# Patient Record
Sex: MALE | Race: BLACK OR AFRICAN AMERICAN | NOT HISPANIC OR LATINO | Employment: UNEMPLOYED | URBAN - METROPOLITAN AREA
[De-identification: names, ages, dates, MRNs, and addresses within clinical notes are randomized per-mention and may not be internally consistent; named-entity substitution may affect disease eponyms.]

---

## 2023-10-05 ENCOUNTER — ONCOLOGY MEDICATION OUTREACH (OUTPATIENT)
Dept: HEMATOLOGY/ONCOLOGY | Facility: HOSPITAL | Age: 73
End: 2023-10-05
Payer: MEDICARE

## 2023-10-05 DIAGNOSIS — C90.00 MULTIPLE MYELOMA, REMISSION STATUS UNSPECIFIED (MULTI): Primary | ICD-10-CM

## 2023-10-05 RX ORDER — LENALIDOMIDE 2.5 MG/1
2.5 CAPSULE ORAL DAILY
COMMUNITY
Start: 2019-10-17 | End: 2023-10-05 | Stop reason: SDUPTHER

## 2023-10-05 RX ORDER — LENALIDOMIDE 2.5 MG/1
CAPSULE ORAL
Qty: 28 CAPSULE | Refills: 0 | Status: SHIPPED | OUTPATIENT
Start: 2023-10-05 | End: 2023-11-03 | Stop reason: SDUPTHER

## 2023-10-05 NOTE — PROGRESS NOTES
ONCOLOGY CLINICAL PHARMACY NOTE     Subjective  Varinder Lizama is a 73 y.o. male with MM, here for refill support.        Treatment history  [No matching plan found]     Objective  There were no vitals taken for this visit.  Lab Results   Component Value Date    WBC 3.1 (L) 09/21/2023    HGB 9.2 (L) 09/21/2023    HCT 28.8 (L) 09/21/2023    MCV 90 09/21/2023     (L) 09/21/2023      Lab Results   Component Value Date    GLUCOSE 108 (H) 09/21/2023    CALCIUM 8.9 09/21/2023     09/21/2023    K 2.6 (LL) 09/21/2023    CO2 35 (H) 09/21/2023     09/21/2023    BUN 20 09/21/2023    CREATININE 1.32 (H) 09/21/2023     Lab Results   Component Value Date    ALT 13 09/21/2023    AST 14 09/21/2023    ALKPHOS 66 09/21/2023    BILITOT 0.9 09/21/2023       Allergies and Medications   Not on File    Current Outpatient Medications:     lenalidomide (Revlimid) 2.5 mg capsule, Take one capsule by mouth once daily for 28 days (28-day cycle), Disp: 28 capsule, Rfl: 0    Assessment and Plan  Varinder Lizama is a 73 y.o. male with MM, to be treated with lenalidomide.    Per Dr. Cutler and Trent Davis's authorization, on 10/5/2023, I refilled lenalidomide 2.5 mg PO once daily for 28 days of a 28 day cycle. #28, 0 RF. Auth# [id number] obtained, and the prescription was sent to [facility].    Patient is on Aspirin 81 mg QD for thromboembolic prophylaxis.          Enoch Reece, PharmD

## 2023-10-18 PROBLEM — H52.4 HYPEROPIA WITH PRESBYOPIA OF BOTH EYES: Status: ACTIVE | Noted: 2023-10-18

## 2023-10-18 PROBLEM — G47.00 INSOMNIA: Status: ACTIVE | Noted: 2023-10-18

## 2023-10-18 PROBLEM — I10 HYPERTENSION: Status: ACTIVE | Noted: 2023-10-18

## 2023-10-18 PROBLEM — H93.19 TINNITUS, SUBJECTIVE: Status: ACTIVE | Noted: 2023-10-18

## 2023-10-18 PROBLEM — C90.30 PLASMACYTOMA (MULTI): Status: ACTIVE | Noted: 2023-10-18

## 2023-10-18 PROBLEM — H01.016 BILATERAL ULCERATIVE BLEPHARITIS: Status: ACTIVE | Noted: 2023-10-18

## 2023-10-18 PROBLEM — D64.9 ANEMIA: Status: ACTIVE | Noted: 2023-10-18

## 2023-10-18 PROBLEM — D47.2 SMOLDERING MULTIPLE MYELOMA: Status: ACTIVE | Noted: 2023-10-18

## 2023-10-18 PROBLEM — H35.81 RETINAL EDEMA OF LEFT EYE: Status: ACTIVE | Noted: 2023-10-18

## 2023-10-18 PROBLEM — M79.89 FOOT SWELLING: Status: ACTIVE | Noted: 2023-10-18

## 2023-10-18 PROBLEM — H35.22: Status: ACTIVE | Noted: 2023-10-18

## 2023-10-18 PROBLEM — E63.9 NUTRITION DISORDER: Status: ACTIVE | Noted: 2023-10-18

## 2023-10-18 PROBLEM — H52.12 MYOPIA OF LEFT EYE: Status: ACTIVE | Noted: 2023-10-18

## 2023-10-18 PROBLEM — H52.12 MYOPIA, LEFT EYE: Status: RESOLVED | Noted: 2023-10-18 | Resolved: 2023-10-18

## 2023-10-18 PROBLEM — H52.12 MYOPIA, LEFT EYE: Status: ACTIVE | Noted: 2023-10-18

## 2023-10-18 PROBLEM — Z96.1 PSEUDOPHAKIA OF LEFT EYE: Status: ACTIVE | Noted: 2023-10-18

## 2023-10-18 PROBLEM — H35.40: Status: ACTIVE | Noted: 2023-10-18

## 2023-10-18 PROBLEM — H52.03 HYPEROPIA WITH PRESBYOPIA OF BOTH EYES: Status: ACTIVE | Noted: 2023-10-18

## 2023-10-18 PROBLEM — H52.223 MYOPIA OF BOTH EYES WITH REGULAR ASTIGMATISM: Status: ACTIVE | Noted: 2023-10-18

## 2023-10-18 PROBLEM — H25.11 AGE-RELATED NUCLEAR CATARACT OF RIGHT EYE: Status: ACTIVE | Noted: 2023-10-18

## 2023-10-18 PROBLEM — R26.9 GAIT ABNORMALITY: Status: ACTIVE | Noted: 2023-10-18

## 2023-10-18 PROBLEM — H35.3132 NONEXUDATIVE AGE-RELATED MACULAR DEGENERATION, BILATERAL, INTERMEDIATE DRY STAGE: Status: ACTIVE | Noted: 2023-10-18

## 2023-10-18 PROBLEM — H52.201 ASTIGMATISM OF RIGHT EYE: Status: ACTIVE | Noted: 2023-10-18

## 2023-10-18 PROBLEM — H34.8320 BRANCH RETINAL VEIN OCCLUSION OF LEFT EYE WITH MACULAR EDEMA (CMS-HCC): Status: ACTIVE | Noted: 2023-10-18

## 2023-10-18 PROBLEM — H52.13 BILATERAL MYOPIA: Status: ACTIVE | Noted: 2023-10-18

## 2023-10-18 PROBLEM — F17.210 SMOKES WITH GREATER THAN 30 PACK YEAR HISTORY: Status: ACTIVE | Noted: 2023-10-18

## 2023-10-18 PROBLEM — Z86.79 H/O: HYPERTENSION: Status: ACTIVE | Noted: 2023-10-18

## 2023-10-18 PROBLEM — Q66.72 CAVUS DEFORMITY OF LEFT FOOT: Status: ACTIVE | Noted: 2023-10-18

## 2023-10-18 PROBLEM — M21.42 ACQUIRED PES PLANUS OF BOTH FEET: Status: ACTIVE | Noted: 2023-10-18

## 2023-10-18 PROBLEM — M21.41 ACQUIRED PES PLANUS OF BOTH FEET: Status: ACTIVE | Noted: 2023-10-18

## 2023-10-18 PROBLEM — E87.6 HYPOKALEMIA: Status: ACTIVE | Noted: 2023-10-18

## 2023-10-18 PROBLEM — D89.2 BENIGN (FAMILIAL) PARAPROTEINEMIA: Status: ACTIVE | Noted: 2023-10-18

## 2023-10-18 PROBLEM — H43.812 PVD (POSTERIOR VITREOUS DETACHMENT), LEFT EYE: Status: ACTIVE | Noted: 2023-10-18

## 2023-10-18 PROBLEM — F20.9 SCHIZOPHRENIA (MULTI): Status: ACTIVE | Noted: 2023-10-18

## 2023-10-18 PROBLEM — H01.013 BILATERAL ULCERATIVE BLEPHARITIS: Status: ACTIVE | Noted: 2023-10-18

## 2023-10-18 PROBLEM — B35.1 ONYCHOMYCOSIS OF TOENAIL: Status: ACTIVE | Noted: 2023-10-18

## 2023-10-18 PROBLEM — H25.011 CORTICAL AGE-RELATED CATARACT OF RIGHT EYE: Status: ACTIVE | Noted: 2023-10-18

## 2023-10-18 PROBLEM — L02.91 ABSCESS: Status: ACTIVE | Noted: 2023-10-18

## 2023-10-18 PROBLEM — H25.811 COMBINED FORM OF AGE-RELATED CATARACT, RIGHT EYE: Status: ACTIVE | Noted: 2023-10-18

## 2023-10-18 PROBLEM — I73.9 PERIPHERAL VASCULAR DISEASE (CMS-HCC): Status: ACTIVE | Noted: 2023-10-18

## 2023-10-18 PROBLEM — H52.4 MYOPIA WITH PRESBYOPIA OF BOTH EYES: Status: ACTIVE | Noted: 2023-10-18

## 2023-10-18 PROBLEM — H52.13 MYOPIA OF BOTH EYES: Status: ACTIVE | Noted: 2023-10-18

## 2023-10-18 PROBLEM — Z96.1 PSEUDOPHAKIA OF RIGHT EYE: Status: ACTIVE | Noted: 2023-10-18

## 2023-10-18 PROBLEM — H52.13 MYOPIA WITH PRESBYOPIA OF BOTH EYES: Status: ACTIVE | Noted: 2023-10-18

## 2023-10-18 PROBLEM — H35.82: Status: ACTIVE | Noted: 2023-10-18

## 2023-10-18 PROBLEM — E61.1 IRON DEFICIENCY: Status: ACTIVE | Noted: 2023-10-18

## 2023-10-18 PROBLEM — Z59.41 FOOD INSECURITY: Status: ACTIVE | Noted: 2023-10-18

## 2023-10-18 PROBLEM — H52.13 MYOPIA OF BOTH EYES WITH REGULAR ASTIGMATISM: Status: ACTIVE | Noted: 2023-10-18

## 2023-10-18 PROBLEM — R60.0 EDEMA OF LOWER EXTREMITY: Status: ACTIVE | Noted: 2023-10-18

## 2023-10-18 PROBLEM — B35.3 TINEA PEDIS OF BOTH FEET: Status: ACTIVE | Noted: 2023-10-18

## 2023-10-18 PROBLEM — B86 SCABIES: Status: ACTIVE | Noted: 2017-01-03

## 2023-10-18 PROBLEM — L85.3 XEROSIS CUTIS: Status: ACTIVE | Noted: 2017-01-03

## 2023-10-18 RX ORDER — OLANZAPINE 20 MG/1
20 TABLET ORAL NIGHTLY
COMMUNITY

## 2023-10-18 RX ORDER — ACYCLOVIR 400 MG/1
400 TABLET ORAL 2 TIMES DAILY
COMMUNITY
End: 2023-11-16 | Stop reason: SDUPTHER

## 2023-10-18 RX ORDER — CALCIUM CARBONATE/VITAMIN D3 600 MG-10
1 TABLET ORAL 2 TIMES DAILY
COMMUNITY
Start: 2023-08-17 | End: 2023-11-16 | Stop reason: SDUPTHER

## 2023-10-18 RX ORDER — HYDROCHLOROTHIAZIDE 12.5 MG/1
12.5 TABLET ORAL
COMMUNITY
End: 2023-11-16 | Stop reason: WASHOUT

## 2023-10-18 RX ORDER — AMLODIPINE BESYLATE 10 MG/1
1 TABLET ORAL DAILY
COMMUNITY

## 2023-10-18 RX ORDER — PERMETHRIN 50 MG/G
1 CREAM TOPICAL
COMMUNITY
Start: 2016-11-14 | End: 2023-11-16 | Stop reason: WASHOUT

## 2023-10-18 RX ORDER — POTASSIUM CHLORIDE 750 MG/1
20 TABLET, EXTENDED RELEASE ORAL 3 TIMES DAILY
COMMUNITY
Start: 2023-08-04 | End: 2023-11-16 | Stop reason: SDUPTHER

## 2023-10-18 RX ORDER — EPINEPHRINE 0.3 MG/.3ML
INJECTION INTRAMUSCULAR
COMMUNITY
Start: 2015-05-12

## 2023-10-18 RX ORDER — ACETAMINOPHEN 500 MG
TABLET ORAL 2 TIMES DAILY
COMMUNITY
Start: 2022-11-10 | End: 2023-11-04

## 2023-10-18 RX ORDER — TRIHEXYPHENIDYL HYDROCHLORIDE 2 MG/1
1 TABLET ORAL NIGHTLY
COMMUNITY

## 2023-10-18 RX ORDER — POTASSIUM CHLORIDE 20 MEQ/1
20 TABLET, EXTENDED RELEASE ORAL
COMMUNITY
Start: 2021-07-06 | End: 2023-11-16 | Stop reason: WASHOUT

## 2023-10-18 RX ORDER — FAMOTIDINE 40 MG/1
40 TABLET, FILM COATED ORAL NIGHTLY
COMMUNITY
Start: 2023-08-04 | End: 2024-01-18 | Stop reason: SDUPTHER

## 2023-10-18 RX ORDER — DAPSONE 100 MG/1
1 TABLET ORAL DAILY
COMMUNITY

## 2023-10-18 RX ORDER — ASPIRIN 81 MG/1
81 TABLET ORAL DAILY
COMMUNITY
End: 2023-11-16 | Stop reason: SDUPTHER

## 2023-10-18 RX ORDER — BENZTROPINE MESYLATE 1 MG/1
TABLET ORAL
COMMUNITY
Start: 2015-09-29

## 2023-10-19 ENCOUNTER — HOSPITAL ENCOUNTER (OUTPATIENT)
Dept: RADIOLOGY | Facility: HOSPITAL | Age: 73
Discharge: HOME | End: 2023-10-19
Payer: MEDICARE

## 2023-10-19 ENCOUNTER — LAB (OUTPATIENT)
Dept: LAB | Facility: HOSPITAL | Age: 73
End: 2023-10-19
Payer: MEDICARE

## 2023-10-19 ENCOUNTER — OFFICE VISIT (OUTPATIENT)
Dept: HEMATOLOGY/ONCOLOGY | Facility: HOSPITAL | Age: 73
End: 2023-10-19
Payer: MEDICARE

## 2023-10-19 VITALS
BODY MASS INDEX: 22.52 KG/M2 | HEIGHT: 68 IN | DIASTOLIC BLOOD PRESSURE: 64 MMHG | WEIGHT: 148.6 LBS | OXYGEN SATURATION: 96 % | SYSTOLIC BLOOD PRESSURE: 134 MMHG | HEART RATE: 77 BPM | TEMPERATURE: 97.7 F | RESPIRATION RATE: 16 BRPM

## 2023-10-19 DIAGNOSIS — C90.00 MULTIPLE MYELOMA, REMISSION STATUS UNSPECIFIED (MULTI): ICD-10-CM

## 2023-10-19 DIAGNOSIS — C90.00 MULTIPLE MYELOMA, REMISSION STATUS UNSPECIFIED (MULTI): Primary | ICD-10-CM

## 2023-10-19 DIAGNOSIS — Z51.11 ENCOUNTER FOR CHEMOTHERAPY MANAGEMENT: ICD-10-CM
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
## 2023-10-19 DIAGNOSIS — M79.89 SWELLING OF LEFT HAND: ICD-10-CM

## 2023-10-19 LAB
ALBUMIN SERPL BCP-MCNC: 4.1 G/DL (ref 3.4–5)
ALP SERPL-CCNC: 67 U/L (ref 33–136)
ALT SERPL W P-5'-P-CCNC: 9 U/L (ref 10–52)
ANION GAP SERPL CALC-SCNC: 11 MMOL/L (ref 10–20)
AST SERPL W P-5'-P-CCNC: 11 U/L (ref 9–39)
BASOPHILS # BLD AUTO: 0.02 X10*3/UL (ref 0–0.1)
BASOPHILS NFR BLD AUTO: 0.7 %
BILIRUB SERPL-MCNC: 0.8 MG/DL (ref 0–1.2)
BUN SERPL-MCNC: 17 MG/DL (ref 6–23)
CALCIUM SERPL-MCNC: 9.5 MG/DL (ref 8.6–10.3)
CHLORIDE SERPL-SCNC: 107 MMOL/L (ref 98–107)
CO2 SERPL-SCNC: 29 MMOL/L (ref 21–32)
CREAT SERPL-MCNC: 1.31 MG/DL (ref 0.5–1.3)
EOSINOPHIL # BLD AUTO: 0.06 X10*3/UL (ref 0–0.4)
EOSINOPHIL NFR BLD AUTO: 2.1 %
ERYTHROCYTE [DISTWIDTH] IN BLOOD BY AUTOMATED COUNT: 16.7 % (ref 11.5–14.5)
GFR SERPL CREATININE-BSD FRML MDRD: 58 ML/MIN/1.73M*2
GLUCOSE SERPL-MCNC: 128 MG/DL (ref 74–99)
HCT VFR BLD AUTO: 30.2 % (ref 41–52)
HGB BLD-MCNC: 9.7 G/DL (ref 13.5–17.5)
IGA SERPL-MCNC: 97 MG/DL (ref 70–400)
IGG SERPL-MCNC: 1060 MG/DL (ref 700–1600)
IGM SERPL-MCNC: 55 MG/DL (ref 40–230)
IMM GRANULOCYTES # BLD AUTO: 0.01 X10*3/UL (ref 0–0.5)
IMM GRANULOCYTES NFR BLD AUTO: 0.3 % (ref 0–0.9)
LDH SERPL L TO P-CCNC: 284 U/L (ref 84–246)
LYMPHOCYTES # BLD AUTO: 1.1 X10*3/UL (ref 0.8–3)
LYMPHOCYTES NFR BLD AUTO: 37.7 %
MCH RBC QN AUTO: 28 PG (ref 26–34)
MCHC RBC AUTO-ENTMCNC: 32.1 G/DL (ref 32–36)
MCV RBC AUTO: 87 FL (ref 80–100)
MONOCYTES # BLD AUTO: 0.23 X10*3/UL (ref 0.05–0.8)
MONOCYTES NFR BLD AUTO: 7.9 %
NEUTROPHILS # BLD AUTO: 1.5 X10*3/UL (ref 1.6–5.5)
NEUTROPHILS NFR BLD AUTO: 51.3 %
NRBC BLD-RTO: 0 /100 WBCS (ref 0–0)
PLATELET # BLD AUTO: 152 X10*3/UL (ref 150–450)
PMV BLD AUTO: 10.9 FL (ref 7.5–11.5)
POTASSIUM SERPL-SCNC: 3.1 MMOL/L (ref 3.5–5.3)
PROT SERPL-MCNC: 6.3 G/DL (ref 6.4–8.2)
PROT SERPL-MCNC: 6.5 G/DL (ref 6.4–8.2)
RBC # BLD AUTO: 3.47 X10*6/UL (ref 4.5–5.9)
SODIUM SERPL-SCNC: 144 MMOL/L (ref 136–145)
VIT B12 SERPL-MCNC: 264 PG/ML (ref 211–911)
WBC # BLD AUTO: 2.9 X10*3/UL (ref 4.4–11.3)

## 2023-10-19 PROCEDURE — 99417 PROLNG OP E/M EACH 15 MIN: CPT

## 2023-10-19 PROCEDURE — 99215 OFFICE O/P EST HI 40 MIN: CPT

## 2023-10-19 PROCEDURE — 83521 IG LIGHT CHAINS FREE EACH: CPT | Mod: CMCLAB

## 2023-10-19 PROCEDURE — 3075F SYST BP GE 130 - 139MM HG: CPT

## 2023-10-19 PROCEDURE — 36415 COLL VENOUS BLD VENIPUNCTURE: CPT | Mod: 91

## 2023-10-19 PROCEDURE — 82607 VITAMIN B-12: CPT | Mod: CMCLAB

## 2023-10-19 PROCEDURE — 85025 COMPLETE CBC W/AUTO DIFF WBC: CPT

## 2023-10-19 PROCEDURE — 82607 VITAMIN B-12: CPT | Mod: 91

## 2023-10-19 PROCEDURE — 84165 PROTEIN E-PHORESIS SERUM: CPT | Mod: 91

## 2023-10-19 PROCEDURE — 73130 X-RAY EXAM OF HAND: CPT | Mod: LT

## 2023-10-19 PROCEDURE — 84155 ASSAY OF PROTEIN SERUM: CPT

## 2023-10-19 PROCEDURE — 3078F DIAST BP <80 MM HG: CPT

## 2023-10-19 PROCEDURE — 84155 ASSAY OF PROTEIN SERUM: CPT | Mod: 59

## 2023-10-19 PROCEDURE — 80053 COMPREHEN METABOLIC PANEL: CPT

## 2023-10-19 PROCEDURE — 83615 LACTATE (LD) (LDH) ENZYME: CPT | Mod: 91

## 2023-10-19 PROCEDURE — 82784 ASSAY IGA/IGD/IGG/IGM EACH: CPT

## 2023-10-19 PROCEDURE — 84165 PROTEIN E-PHORESIS SERUM: CPT

## 2023-10-19 PROCEDURE — 1125F AMNT PAIN NOTED PAIN PRSNT: CPT

## 2023-10-19 PROCEDURE — 73130 X-RAY EXAM OF HAND: CPT | Mod: LEFT SIDE | Performed by: INTERNAL MEDICINE

## 2023-10-19 PROCEDURE — 84165 PROTEIN E-PHORESIS SERUM: CPT | Performed by: PATHOLOGY

## 2023-10-19 ASSESSMENT — ENCOUNTER SYMPTOMS
DEPRESSION: 1
LOSS OF SENSATION IN FEET: 0
OCCASIONAL FEELINGS OF UNSTEADINESS: 1

## 2023-10-19 ASSESSMENT — PAIN SCALES - GENERAL: PAINLEVEL: 10-WORST PAIN EVER

## 2023-10-20 PROBLEM — C90.00 MULTIPLE MYELOMA (MULTI): Status: ACTIVE | Noted: 2023-10-20

## 2023-10-20 PROBLEM — Z51.11 ENCOUNTER FOR CHEMOTHERAPY MANAGEMENT: Status: ACTIVE | Noted: 2023-10-20

## 2023-10-20 LAB
KAPPA LC SERPL-MCNC: 2.98 MG/DL (ref 0.33–1.94)
KAPPA LC/LAMBDA SER: 1.45 {RATIO} (ref 0.26–1.65)
LAMBDA LC SERPL-MCNC: 2.06 MG/DL (ref 0.57–2.63)

## 2023-10-20 ASSESSMENT — ENCOUNTER SYMPTOMS
RESPIRATORY NEGATIVE: 1
ALLERGIC/IMMUNOLOGIC NEGATIVE: 1
HEMATOLOGIC/LYMPHATIC NEGATIVE: 1
PSYCHIATRIC NEGATIVE: 1
GASTROINTESTINAL NEGATIVE: 1
CARDIOVASCULAR NEGATIVE: 1
ENDOCRINE NEGATIVE: 1
JOINT SWELLING: 1
EYES NEGATIVE: 1
CONSTITUTIONAL NEGATIVE: 1
WEAKNESS: 1

## 2023-10-20 NOTE — ASSESSMENT & PLAN NOTE
Multiple Myeloma, IgG lambda, initially diagnosed in 7/2012  - Smoldering MM-> progressed in 8/2016 R-ISS stage II  - started on Rd on 9/15/16-> dose reduction>>> 2.5 mg currently  - Last bone survey in 5/2021: Small lesion of the right infra glenoid scapula unchanged from multiple prior studies. No new osseous lesions identified.  - Recent SPEP/IF: no measurable paraprotein  - Last UPEP/IF in 5/2016-> 182.4 mg/L IgG lambda  - Continue Revlimid 2.5mg   - Zometa last 11/18/21, no plans to resume at this time  - cont to monitor Myeloma panel every 2 months.  - check UPEP/IF next visit, PET/CT      Pancytopenia  - Partially due to Revlimid  - stable for past >5 years     Vitamin B12:  - Last injection 11/18/21     CKD  since 7/2017  - stable    Hypokalemia:  - Taking 20meq K TID  - K 3.1, pt. Stated he was only taking 20meq BID, encouraged TID dosing  - discontinued HCTZ  - advised to see PCP for HTN meds    Fall:  - Had a fall ~2 weeks ago  - X-ray of L hand negative for any break    Podiatry:  - Dry skin on feet, nails need care  - Referral to podiatry

## 2023-10-20 NOTE — PROGRESS NOTES
Patient ID: Varinder Lizama is a 73 y.o. male.  Referring Physician: No referring provider defined for this encounter.  Primary Care Provider: Zeynep Zavala LPN    Date of Service:  10/19/2023    ASSESSMENT and PLAN:    Multiple myeloma (CMS/[facility])  Multiple Myeloma, IgG lambda, initially diagnosed in 7/2012  - Smoldering MM-> progressed in 8/2016 R-ISS stage II  - started on Rd on 9/15/16-> dose reduction>>> 2.5 mg currently  - Last bone survey in 5/2021: Small lesion of the right infra glenoid scapula unchanged from multiple prior studies. No new osseous lesions identified.  - Recent SPEP/IF: no measurable paraprotein  - Last UPEP/IF in 5/2016-> 182.4 mg/L IgG lambda  - Continue Revlimid 2.5mg   - Zometa last 11/18/21, no plans to resume at this time  - cont to monitor Myeloma panel every 2 months.  - check UPEP/IF next visit, PET/CT      Pancytopenia  - Partially due to Revlimid  - stable for past >5 years     Vitamin B12:  - Last injection 11/18/21     CKD  since 7/2017  - stable    Hypokalemia:  - Taking 20meq K TID  - K 3.1, pt. Stated he was only taking 20meq BID, encouraged TID dosing  - discontinued HCTZ  - advised to see PCP for HTN meds    Fall:  - Had a fall ~2 weeks ago  - X-ray of L hand negative for any break            Oncology History Overview Note   Smoldering multiple myeloma, ISS stage II:  Characterized by IgG lambda-restricted paraprotein, initially 2.9 g/dL without evidence of end-organ damage. No lytic bone lesions. No anemia. No hypercalcemia. No renal dysfunction. 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 His kappa lambda light chains are low risk. He also has microcytosis likely secondary to alpha-thalassemia  versus iron deficiency.     Symptomatic Multiple Myeloma ISS stage II   Characterized by anemia noted in 8/2016    Lenalidomide (25 mg daily 21 days on 7  "days off) plus Dexamethasone (40 mg once weekly) started on 9/15/2016, stopped briefly due to rash restarted on 12/17/2016    Started maintenance Lenalidomide 10 mg  5/2017 every other day decreasing to 2.5 mg daily 12/2017        Multiple myeloma (CMS/HCC)   10/20/2023 Initial Diagnosis    Multiple myeloma (CMS/[facility])        SUBJECTIVE:  History of Present Illness:  Varinder presents to clinic 10/19/23 for a follow up visit with his friend Snow.     Overall he is doing well.    Snow mentions Varinder fell a few weeks ago and has had a swollen L hand since. Also reporting a need for a podiatrist.        Review of Systems   Constitutional: Negative.    HENT: Negative.     Eyes: Negative.    Respiratory: Negative.     Cardiovascular: Negative.    Gastrointestinal: Negative.    Endocrine: Negative.    Genitourinary: Negative.    Musculoskeletal:  Positive for joint swelling.   Skin: Negative.    Allergic/Immunologic: Negative.    Neurological:  Positive for weakness.   Hematological: Negative.    Psychiatric/Behavioral: Negative.         OBJECTIVE:  KPS: Karnofsky Score: 80 - Normal activity with effort; some signs or symptoms of disease   VS:  /64 (BP Location: Left arm, Patient Position: Sitting, BP Cuff Size: Large adult)   Pulse 77   Temp 36.5 °C (97.7 °F) (Temporal)   Resp 16   Ht (S) 1.739 m (5' 8.47\")   Wt 67.4 kg (148 lb 9.6 oz)   SpO2 96%   BMI 22.29 kg/m²   BSA: 1.8 meters squared    Physical Exam  Constitutional:       Appearance: Normal appearance. He is normal weight.   HENT:      Head: Normocephalic.      Nose: Nose normal.      Mouth/Throat:      Mouth: Mucous membranes are moist.      Pharynx: Oropharynx is clear.   Eyes:      Extraocular Movements: Extraocular movements intact.      Conjunctiva/sclera: Conjunctivae normal.      Pupils: Pupils are equal, round, and reactive to light.   Cardiovascular:      Rate and Rhythm: Normal rate and regular rhythm.      Pulses: Normal pulses.      Heart sounds: " Normal heart sounds.   Pulmonary:      Effort: Pulmonary effort is normal.      Breath sounds: Normal breath sounds.   Abdominal:      General: Abdomen is flat. Bowel sounds are normal.      Palpations: Abdomen is soft.   Musculoskeletal:         General: Swelling and signs of injury present.      Cervical back: Normal range of motion and neck supple.      Comments: Swollen L hand   Skin:     General: Skin is warm and dry.      Comments: Dry skin, especially on feet   Neurological:      General: No focal deficit present.      Mental Status: He is alert and oriented to person, place, and time. Mental status is at baseline.      Motor: Weakness present.      Gait: Gait abnormal.   Psychiatric:         Mood and Affect: Mood normal.         Behavior: Behavior normal.         Thought Content: Thought content normal.         Judgment: Judgment normal.         Laboratory:  The pertinent laboratory results were reviewed and discussed with the patient.    Lab Results   Component Value Date    WBC 2.9 (L) 10/19/2023    HCT 30.2 (L) 10/19/2023    HGB 9.7 (L) 10/19/2023     10/19/2023    K 3.1 (L) 10/19/2023    CALCIUM 9.5 10/19/2023     10/19/2023    MG 1.80 11/21/2019    ALT 9 (L) 10/19/2023    AST 11 10/19/2023    BUN 17 10/19/2023    CREATININE 1.31 (H) 10/19/2023    PHOS 2.7 07/07/2022    KAPPA 4.03 (H) 09/21/2023    LAMBDA 2.46 09/21/2023    SPEP NORMAL 09/21/2023    IEPIN NORMAL 09/21/2023    IGG 1,060 10/19/2023    IGM 55 10/19/2023    IGA 97 10/19/2023      Note: for a comprehensive list of the patient's lab results, access the Results Review activity.    RTC:    Enoch Davis, MASON-CNP

## 2023-10-23 LAB
ALBUMIN: 4 G/DL (ref 3.4–5)
ALPHA 1 GLOBULIN: 0.3 G/DL (ref 0.2–0.6)
ALPHA 2 GLOBULIN: 0.5 G/DL (ref 0.4–1.1)
BETA GLOBULIN: 0.6 G/DL (ref 0.5–1.2)
GAMMA GLOBULIN: 0.9 G/DL (ref 0.5–1.4)
PATH REVIEW-SERUM PROTEIN ELECTROPHORESIS: NORMAL
PROTEIN ELECTROPHORESIS COMMENT: NORMAL

## 2023-11-01 ENCOUNTER — APPOINTMENT (OUTPATIENT)
Dept: RADIOLOGY | Facility: HOSPITAL | Age: 73
End: 2023-11-01
Payer: MEDICARE

## 2023-11-03 ENCOUNTER — APPOINTMENT (OUTPATIENT)
Dept: RADIOLOGY | Facility: HOSPITAL | Age: 73
End: 2023-11-03
Payer: MEDICARE

## 2023-11-03 ENCOUNTER — ONCOLOGY MEDICATION OUTREACH (OUTPATIENT)
Dept: HEMATOLOGY/ONCOLOGY | Facility: HOSPITAL | Age: 73
End: 2023-11-03
Payer: MEDICARE

## 2023-11-03 DIAGNOSIS — C90.00 MULTIPLE MYELOMA, REMISSION STATUS UNSPECIFIED (MULTI): ICD-10-CM

## 2023-11-03 RX ORDER — LENALIDOMIDE 2.5 MG/1
CAPSULE ORAL
Qty: 28 CAPSULE | Refills: 0 | Status: SHIPPED | OUTPATIENT
Start: 2023-11-03 | End: 2023-11-30 | Stop reason: SDUPTHER

## 2023-11-03 NOTE — PROGRESS NOTES
ONCOLOGY CLINICAL PHARMACY NOTE     Subjective  Varinder Lizama is a 73 y.o. male with MM, here for refill support.        Treatment history  [No matching plan found]     Objective  There were no vitals taken for this visit.  Lab Results   Component Value Date    WBC 2.9 (L) 10/19/2023    HGB 9.7 (L) 10/19/2023    HCT 30.2 (L) 10/19/2023    MCV 87 10/19/2023     10/19/2023      Lab Results   Component Value Date    GLUCOSE 128 (H) 10/19/2023    CALCIUM 9.5 10/19/2023     10/19/2023    K 3.1 (L) 10/19/2023    CO2 29 10/19/2023     10/19/2023    BUN 17 10/19/2023    CREATININE 1.31 (H) 10/19/2023     Lab Results   Component Value Date    ALT 9 (L) 10/19/2023    AST 11 10/19/2023    ALKPHOS 67 10/19/2023    BILITOT 0.8 10/19/2023       Allergies and Medications   Allergies   Allergen Reactions    Lenalidomide Unknown    Lisinopril Angioedema       Current Outpatient Medications:     acyclovir (Zovirax) 400 mg tablet, Take 1 tablet (400 mg) by mouth 2 times a day., Disp: , Rfl:     amLODIPine (Norvasc) 10 mg tablet, Take 1 tablet (10 mg) by mouth once daily., Disp: , Rfl:     aspirin 81 mg EC tablet, Take 1 tablet (81 mg) by mouth once daily., Disp: , Rfl:     benztropine (Cogentin) 1 mg tablet, Take by mouth., Disp: , Rfl:     Calcium 600 + D,3, 600 mg-10 mcg (400 unit) tablet, Take 1 tablet by mouth 2 times a day., Disp: , Rfl:     calcium carbonate-vitamin D3 600 mg-20 mcg (800 unit) tablet, Take by mouth twice a day., Disp: , Rfl:     dapsone 100 mg tablet, Take 1 tablet (100 mg) by mouth once daily., Disp: , Rfl:     EPINEPHrine (EpiPen 2-Brett) 0.3 mg/0.3 mL injection syringe, Use as directed PRN, Disp: , Rfl:     famotidine (Pepcid) 40 mg tablet, Take 1 tablet (40 mg) by mouth once daily at bedtime., Disp: , Rfl:     hydroCHLOROthiazide (HYDRODiuril) 12.5 mg tablet, Take 1 tablet (12.5 mg) by mouth once daily in the morning. Take before meals., Disp: , Rfl:     lenalidomide (Revlimid) 2.5 mg capsule,  Take one capsule by mouth once daily for 28 days (28-day cycle), Disp: 28 capsule, Rfl: 0    OLANZapine (ZyPREXA) 20 mg tablet, Take 1 tablet (20 mg) by mouth once daily at bedtime., Disp: , Rfl:     permethrin (Elimite) 5 % cream, 1 Application., Disp: , Rfl:     potassium chloride CR 10 mEq ER tablet, Take 2 tablets (20 mEq) by mouth 3 times a day., Disp: , Rfl:     potassium chloride CR 20 mEq ER tablet, Take 1 tablet (20 mEq) by mouth 3 times a day with meals., Disp: , Rfl:     trihexyphenidyl (Artane) 2 mg tablet, Take 0.5 tablets (1 mg) by mouth once daily at bedtime., Disp: , Rfl:     Assessment and Plan  Varinder Lizama is a 73 y.o. male with MM, to be treated with lenalidomide.    Per Dr. Cutler and Trent Davis's authorization, on 11/3/2023, I refilled lenalidomide 2.5 mg PO once daily for 28 days of a 28 day cycle. #28, 0 RF. Auth # [id number] obtained, and the prescription was sent to [facility].     Patient is on Aspirin 81 mg QD for thromboembolic prophylaxis.      Enoch Reece, PharmD

## 2023-11-07 ENCOUNTER — TELEPHONE (OUTPATIENT)
Dept: ADMISSION | Facility: HOSPITAL | Age: 73
End: 2023-11-07
Payer: MEDICARE

## 2023-11-07 ENCOUNTER — HOSPITAL ENCOUNTER (OUTPATIENT)
Dept: RADIOLOGY | Facility: HOSPITAL | Age: 73
Discharge: HOME | End: 2023-11-07
Payer: MEDICARE

## 2023-11-07 DIAGNOSIS — C90.00 MULTIPLE MYELOMA NOT HAVING ACHIEVED REMISSION (MULTI): ICD-10-CM

## 2023-11-07 LAB — GLUCOSE BLD MANUAL STRIP-MCNC: 113 MG/DL (ref 74–99)

## 2023-11-07 PROCEDURE — 3430000001 HC RX 343 DIAGNOSTIC RADIOPHARMACEUTICALS: Performed by: INTERNAL MEDICINE

## 2023-11-07 PROCEDURE — 82947 ASSAY GLUCOSE BLOOD QUANT: CPT

## 2023-11-07 PROCEDURE — 78816 PET IMAGE W/CT FULL BODY: CPT | Mod: PET TUMOR SUBSQ TX STRATEGY | Performed by: NUCLEAR MEDICINE

## 2023-11-07 PROCEDURE — A9552 F18 FDG: HCPCS | Performed by: INTERNAL MEDICINE

## 2023-11-07 PROCEDURE — 78816 PET IMAGE W/CT FULL BODY: CPT | Mod: PS

## 2023-11-07 RX ORDER — FLUDEOXYGLUCOSE F 18 200 MCI/ML
12.9 INJECTION, SOLUTION INTRAVENOUS
Status: COMPLETED | OUTPATIENT
Start: 2023-11-07 | End: 2023-11-07

## 2023-11-07 RX ADMIN — FLUDEOXYGLUCOSE F 18 12.9 MILLICURIE: 200 INJECTION, SOLUTION INTRAVENOUS at 07:54

## 2023-11-07 NOTE — TELEPHONE ENCOUNTER
[facility] called regarding Revlimid/ lenalidomide RX.     There is an allergy listed in the chart and they just want to discuss patients allergy.     Discussed providers note and our allergy was listed for 2016  Per providers note:  6.  Lenalidomide (25 mg daily 21 days on 7 days off) plus Dexamethasone (40 mg once weekly) started on 9/15/2016, stopped briefly due to rash restarted on 12/17/2016       Want to discuss with team. Message sent to [employer] светлана Newman     Awaiting response.

## 2023-11-16 ENCOUNTER — OFFICE VISIT (OUTPATIENT)
Dept: HEMATOLOGY/ONCOLOGY | Facility: HOSPITAL | Age: 73
End: 2023-11-16
Payer: MEDICARE

## 2023-11-16 VITALS
OXYGEN SATURATION: 94 % | BODY MASS INDEX: 22.58 KG/M2 | WEIGHT: 150.57 LBS | HEART RATE: 101 BPM | TEMPERATURE: 97.7 F | RESPIRATION RATE: 18 BRPM | SYSTOLIC BLOOD PRESSURE: 131 MMHG | DIASTOLIC BLOOD PRESSURE: 57 MMHG

## 2023-11-16 DIAGNOSIS — Z51.11 ENCOUNTER FOR CHEMOTHERAPY MANAGEMENT: ICD-10-CM

## 2023-11-16 DIAGNOSIS — C90.00 MULTIPLE MYELOMA, REMISSION STATUS UNSPECIFIED (MULTI): Primary | ICD-10-CM

## 2023-11-16 PROCEDURE — 3078F DIAST BP <80 MM HG: CPT

## 2023-11-16 PROCEDURE — 99215 OFFICE O/P EST HI 40 MIN: CPT

## 2023-11-16 PROCEDURE — 1159F MED LIST DOCD IN RCRD: CPT

## 2023-11-16 PROCEDURE — 3075F SYST BP GE 130 - 139MM HG: CPT

## 2023-11-16 PROCEDURE — 1036F TOBACCO NON-USER: CPT

## 2023-11-16 PROCEDURE — 99417 PROLNG OP E/M EACH 15 MIN: CPT

## 2023-11-16 PROCEDURE — 1126F AMNT PAIN NOTED NONE PRSNT: CPT

## 2023-11-16 RX ORDER — ACYCLOVIR 400 MG/1
400 TABLET ORAL 2 TIMES DAILY
Qty: 180 TABLET | Refills: 2 | Status: SHIPPED | OUTPATIENT
Start: 2023-11-16 | End: 2023-11-16 | Stop reason: SDUPTHER

## 2023-11-16 RX ORDER — POTASSIUM CHLORIDE 750 MG/1
20 TABLET, EXTENDED RELEASE ORAL 3 TIMES DAILY
Qty: 810 TABLET | Refills: 2 | Status: SHIPPED | OUTPATIENT
Start: 2023-11-16 | End: 2024-02-15 | Stop reason: SDUPTHER

## 2023-11-16 RX ORDER — ASPIRIN 81 MG/1
81 TABLET ORAL DAILY
Qty: 90 TABLET | Refills: 2 | Status: SHIPPED | OUTPATIENT
Start: 2023-11-16 | End: 2024-08-12

## 2023-11-16 RX ORDER — ACYCLOVIR 400 MG/1
400 TABLET ORAL 2 TIMES DAILY
Qty: 180 TABLET | Refills: 2 | Status: SHIPPED | OUTPATIENT
Start: 2023-11-16 | End: 2024-01-18 | Stop reason: SDUPTHER

## 2023-11-16 RX ORDER — ASPIRIN 81 MG/1
81 TABLET ORAL DAILY
Qty: 90 TABLET | Refills: 2 | Status: SHIPPED | OUTPATIENT
Start: 2023-11-16 | End: 2023-11-16 | Stop reason: SDUPTHER

## 2023-11-16 RX ORDER — CALCIUM CARBONATE/VITAMIN D3 600 MG-10
1 TABLET ORAL 2 TIMES DAILY
Qty: 180 TABLET | Refills: 2 | Status: SHIPPED | OUTPATIENT
Start: 2023-11-16 | End: 2024-08-12

## 2023-11-16 ASSESSMENT — ENCOUNTER SYMPTOMS
EYES NEGATIVE: 1
PSYCHIATRIC NEGATIVE: 1
ALLERGIC/IMMUNOLOGIC NEGATIVE: 1
WEAKNESS: 1
CARDIOVASCULAR NEGATIVE: 1
CONSTITUTIONAL NEGATIVE: 1
RESPIRATORY NEGATIVE: 1
GASTROINTESTINAL NEGATIVE: 1
ENDOCRINE NEGATIVE: 1
HEMATOLOGIC/LYMPHATIC NEGATIVE: 1

## 2023-11-16 ASSESSMENT — PAIN SCALES - GENERAL: PAINLEVEL: 0-NO PAIN

## 2023-11-16 ASSESSMENT — COLUMBIA-SUICIDE SEVERITY RATING SCALE - C-SSRS
1. IN THE PAST MONTH, HAVE YOU WISHED YOU WERE DEAD OR WISHED YOU COULD GO TO SLEEP AND NOT WAKE UP?: NO
2. HAVE YOU ACTUALLY HAD ANY THOUGHTS OF KILLING YOURSELF?: NO
6. HAVE YOU EVER DONE ANYTHING, STARTED TO DO ANYTHING, OR PREPARED TO DO ANYTHING TO END YOUR LIFE?: NO

## 2023-11-16 ASSESSMENT — PATIENT HEALTH QUESTIONNAIRE - PHQ9
2. FEELING DOWN, DEPRESSED OR HOPELESS: NOT AT ALL
SUM OF ALL RESPONSES TO PHQ9 QUESTIONS 1 AND 2: 0
1. LITTLE INTEREST OR PLEASURE IN DOING THINGS: NOT AT ALL

## 2023-11-16 NOTE — PROGRESS NOTES
Patient ID: Varinder Lizama is a 73 y.o. male.  Referring Physician: Enoch Davis, APRN-CNP  [id number] [place] [address]  Primary Care Provider: Zeynep Zavala LPN    Date of Service:  11/16/2023    ASSESSMENT and PLAN:    Multiple myeloma (CMS/[facility])  Multiple Myeloma, IgG lambda, initially diagnosed in 7/2012  - Smoldering MM-> progressed in 8/2016 R-ISS stage II  - started on Rd on 9/15/16-> dose reduction>>> 2.5 mg currently  - Last bone survey in 5/2021: Small lesion of the right infra glenoid scapula unchanged from multiple prior studies. No new osseous lesions identified.  - Recent SPEP/IF: no measurable paraprotein  - Last UPEP/IF in 5/2016-> 182.4 mg/L IgG lambda  - Continue Revlimid 2.5mg   - Zometa last 11/18/21, no plans to resume at this time  - cont to monitor Myeloma panel every 2 months.  - check UPEP/IF next visit, PET/CT      Pancytopenia  - Partially due to Revlimid  - stable for past >5 years     Vitamin B12:  - Last injection 11/18/21     CKD  since 7/2017  - stable    Hypokalemia:  - Taking 20meq K TID  - K 3.1, pt. Stated he was only taking 20meq BID, encouraged TID dosing  - discontinued HCTZ  - advised to see PCP for HTN meds    Fall:  - Had a fall ~2 weeks ago  - X-ray of L hand negative for any break    Podiatry:  - Dry skin on feet, nails need care  - Referral to podiatry             Oncology History Overview Note   Smoldering multiple myeloma, ISS stage II:  Characterized by IgG lambda-restricted paraprotein, initially 2.9 g/dL without evidence of end-organ damage. No lytic bone lesions. No anemia. No hypercalcemia. No renal dysfunction. 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 His kappa lambda light chains are low risk. He also has microcytosis likely secondary to alpha-thalassemia  versus iron deficiency.     Symptomatic Multiple Myeloma ISS  stage II   Characterized by anemia noted in 8/2016    Lenalidomide (25 mg daily 21 days on 7 days off) plus Dexamethasone (40 mg once weekly) started on 9/15/2016, stopped briefly due to rash restarted on 12/17/2016    Started maintenance Lenalidomide 10 mg  5/2017 every other day decreasing to 2.5 mg daily 12/2017        Multiple myeloma (CMS/HCC)   10/20/2023 Initial Diagnosis    Multiple myeloma (CMS/HCC)        SUBJECTIVE:  History of Present Illness:  Varinder presents to clinic 11/16/23 for a follow up visit with his friend Snow.     Overall he is doing well.    The swelling in his hand has improved. No new concerning s/s at this time.         Review of Systems   Constitutional: Negative.    HENT: Negative.     Eyes: Negative.    Respiratory: Negative.     Cardiovascular: Negative.    Gastrointestinal: Negative.    Endocrine: Negative.    Genitourinary: Negative.    Musculoskeletal: Negative.    Skin: Negative.    Allergic/Immunologic: Negative.    Neurological:  Positive for weakness.   Hematological: Negative.    Psychiatric/Behavioral: Negative.         OBJECTIVE:  KPS: Karnofsky Score: 80 - Normal activity with effort; some signs or symptoms of disease   VS:  /57 (BP Location: Left arm, Patient Position: Sitting)   Pulse 101   Temp 36.5 °C (97.7 °F)   Resp 18   Wt 68.3 kg (150 lb 9.2 oz)   SpO2 94%   BMI 22.58 kg/m²   BSA: 1.82 meters squared    Physical Exam  Constitutional:       Appearance: Normal appearance. He is normal weight.   HENT:      Head: Normocephalic.      Nose: Nose normal.      Mouth/Throat:      Mouth: Mucous membranes are moist.      Pharynx: Oropharynx is clear.   Eyes:      Extraocular Movements: Extraocular movements intact.      Conjunctiva/sclera: Conjunctivae normal.      Pupils: Pupils are equal, round, and reactive to light.   Cardiovascular:      Rate and Rhythm: Normal rate and regular rhythm.      Pulses: Normal pulses.      Heart sounds: Normal heart sounds.    Pulmonary:      Effort: Pulmonary effort is normal.      Breath sounds: Normal breath sounds.   Abdominal:      General: Abdomen is flat. Bowel sounds are normal.      Palpations: Abdomen is soft.   Musculoskeletal:         General: Normal range of motion.      Cervical back: Normal range of motion and neck supple.      Comments: Swollen L hand   Skin:     General: Skin is warm and dry.      Comments: Dry skin, especially on feet   Neurological:      General: No focal deficit present.      Mental Status: He is alert and oriented to person, place, and time. Mental status is at baseline.      Motor: Weakness present.      Gait: Gait abnormal.   Psychiatric:         Mood and Affect: Mood normal.         Behavior: Behavior normal.         Thought Content: Thought content normal.         Judgment: Judgment normal.         Laboratory:  The pertinent laboratory results were reviewed and discussed with the patient.    Lab Results   Component Value Date    WBC 2.9 (L) 10/19/2023    HCT 30.2 (L) 10/19/2023    HGB 9.7 (L) 10/19/2023     10/19/2023    K 3.1 (L) 10/19/2023    CALCIUM 9.5 10/19/2023     10/19/2023    MG 1.80 11/21/2019    ALT 9 (L) 10/19/2023    AST 11 10/19/2023    BUN 17 10/19/2023    CREATININE 1.31 (H) 10/19/2023    PHOS 2.7 07/07/2022    KAPPA 2.98 (H) 10/19/2023    LAMBDA 2.06 10/19/2023    KAPLS 1.45 10/19/2023    SPEP Normal. 10/19/2023    IEPIN NORMAL 09/21/2023    IGG 1,060 10/19/2023    IGM 55 10/19/2023    IGA 97 10/19/2023      Note: for a comprehensive list of the patient's lab results, access the Results Review activity.    RTC:  2 months w/ Dr. Omayra Davis, APRN-CNP

## 2023-11-19 ASSESSMENT — ENCOUNTER SYMPTOMS: MUSCULOSKELETAL NEGATIVE: 1

## 2023-11-30 ENCOUNTER — ONCOLOGY MEDICATION OUTREACH (OUTPATIENT)
Dept: HEMATOLOGY/ONCOLOGY | Facility: HOSPITAL | Age: 73
End: 2023-11-30
Payer: MEDICARE

## 2023-11-30 DIAGNOSIS — C90.00 MULTIPLE MYELOMA, REMISSION STATUS UNSPECIFIED (MULTI): ICD-10-CM

## 2023-11-30 RX ORDER — LENALIDOMIDE 2.5 MG/1
CAPSULE ORAL
Qty: 28 CAPSULE | Refills: 0 | Status: SHIPPED | OUTPATIENT
Start: 2023-11-30 | End: 2023-12-28 | Stop reason: SDUPTHER

## 2023-11-30 NOTE — PROGRESS NOTES
ONCOLOGY CLINICAL PHARMACY NOTE     Subjective  Varinder Lizama is a 73 y.o. male with MM, here for refill support.        Treatment history  [No matching plan found]     Objective  There were no vitals taken for this visit.  Lab Results   Component Value Date    WBC 2.9 (L) 10/19/2023    HGB 9.7 (L) 10/19/2023    HCT 30.2 (L) 10/19/2023    MCV 87 10/19/2023     10/19/2023      Lab Results   Component Value Date    GLUCOSE 128 (H) 10/19/2023    CALCIUM 9.5 10/19/2023     10/19/2023    K 3.1 (L) 10/19/2023    CO2 29 10/19/2023     10/19/2023    BUN 17 10/19/2023    CREATININE 1.31 (H) 10/19/2023     Lab Results   Component Value Date    ALT 9 (L) 10/19/2023    AST 11 10/19/2023    ALKPHOS 67 10/19/2023    BILITOT 0.8 10/19/2023       Allergies and Medications   Allergies   Allergen Reactions    Lenalidomide Unknown    Lisinopril Angioedema       Current Outpatient Medications:     acyclovir (Zovirax) 400 mg tablet, Take 1 tablet (400 mg) by mouth 2 times a day., Disp: 180 tablet, Rfl: 2    amLODIPine (Norvasc) 10 mg tablet, Take 1 tablet (10 mg) by mouth once daily., Disp: , Rfl:     aspirin 81 mg EC tablet, Take 1 tablet (81 mg) by mouth once daily., Disp: 90 tablet, Rfl: 2    benztropine (Cogentin) 1 mg tablet, Take by mouth., Disp: , Rfl:     Calcium 600 + D,3, 600 mg-10 mcg (400 unit) tablet, Take 1 tablet by mouth 2 times a day., Disp: 180 tablet, Rfl: 2    dapsone 100 mg tablet, Take 1 tablet (100 mg) by mouth once daily., Disp: , Rfl:     EPINEPHrine (EpiPen 2-Brett) 0.3 mg/0.3 mL injection syringe, Use as directed PRN, Disp: , Rfl:     famotidine (Pepcid) 40 mg tablet, Take 1 tablet (40 mg) by mouth once daily at bedtime., Disp: , Rfl:     lenalidomide (Revlimid) 2.5 mg capsule, Take one capsule by mouth once daily for 28 days (28-day cycle), Disp: 28 capsule, Rfl: 0    OLANZapine (ZyPREXA) 20 mg tablet, Take 1 tablet (20 mg) by mouth once daily at bedtime., Disp: , Rfl:     potassium chloride CR  10 mEq ER tablet, Take 2 tablets (20 mEq) by mouth 3 times a day., Disp: 810 tablet, Rfl: 2    trihexyphenidyl (Artane) 2 mg tablet, Take 0.5 tablets (1 mg) by mouth once daily at bedtime., Disp: , Rfl:     Assessment and Plan  Varinder Lizama is a 73 y.o. male with MM, to be treated with lenalidomide.    Per Dr. Cutler and Trent Davis's authorization, on 11/30/2023, I refilled lenalidomide 2.5 mg PO once daily for 28 days of a 28 day cycle. #28, 0 RF. Auth # [id number] obtained, and the prescription was sent to [facility].     Patient is on Aspirin 81 mg QD for thromboembolic prophylaxis.        Enoch Reece, PharmD

## 2023-12-11 ENCOUNTER — OFFICE VISIT (OUTPATIENT)
Dept: PODIATRY | Facility: CLINIC | Age: 73
End: 2023-12-11
Payer: MEDICARE

## 2023-12-11 DIAGNOSIS — L85.3 XEROSIS OF SKIN: Primary | ICD-10-CM

## 2023-12-11 DIAGNOSIS — B35.1 ONYCHOMYCOSIS OF TOENAIL: ICD-10-CM

## 2023-12-11 DIAGNOSIS — M79.672 PAIN IN BOTH FEET: ICD-10-CM

## 2023-12-11 DIAGNOSIS — M79.671 PAIN IN BOTH FEET: ICD-10-CM

## 2023-12-11 PROCEDURE — 1160F RVW MEDS BY RX/DR IN RCRD: CPT | Performed by: PODIATRIST

## 2023-12-11 PROCEDURE — 99203 OFFICE O/P NEW LOW 30 MIN: CPT | Performed by: PODIATRIST

## 2023-12-11 PROCEDURE — 1036F TOBACCO NON-USER: CPT | Performed by: PODIATRIST

## 2023-12-11 PROCEDURE — 1159F MED LIST DOCD IN RCRD: CPT | Performed by: PODIATRIST

## 2023-12-11 PROCEDURE — 1126F AMNT PAIN NOTED NONE PRSNT: CPT | Performed by: PODIATRIST

## 2023-12-11 RX ORDER — AMMONIUM LACTATE 12 G/100G
CREAM TOPICAL AS NEEDED
Qty: 385 G | Refills: 3 | Status: SHIPPED | OUTPATIENT
Start: 2023-12-11 | End: 2024-12-10

## 2023-12-11 NOTE — PROGRESS NOTES
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    Review Of Systems:  GENERAL: No weight loss, malaise or fevers.  HEENT: Negative for frequent or significant headaches,   RESPIRATORY: Negative for cough, wheezing or shortness of breath.  CARDIOVASCULAR: Negative for chest pain, leg swelling or palpitations.    Examination of Both Lower Extremities:   Objective:   Vasc: DP and PT pulses are palpable bilateral.  CFT is less than 3 seconds bilateral.  Skin temperature is warm to cool proximal to distal bilateral.  Poor pedal hygiene noted    Neuro: Vibratory, light touch and proprioception are intact bilateral.      Derm: Nails 1-5 bilateral are intact thick and discolored.   Skin is thick and dry. Xerosis noted.       Ortho: Muscle strength is 5/5 for all pedal groups tested.      1. Xerosis of skin  ammonium lactate (Amlactin) 12 % cream      2. Onychomycosis of toenail  ammonium lactate (Amlactin) 12 % cream      3. Pain in both feet  ammonium lactate (Amlactin) 12 % cream          Patient exam and eval  Nails 1-5 b/l were debrided  in thickness and length with nail cutting forceps.  Called in topical cream  Patient to follow up in 6 mos or sooner if any problems arise.   Patient was in agreement to this plan. All questions answered.      Faith Brito DPM  [phone]  Option 2  Fax: [phone]

## 2023-12-28 ENCOUNTER — ONCOLOGY MEDICATION OUTREACH (OUTPATIENT)
Dept: HEMATOLOGY/ONCOLOGY | Facility: HOSPITAL | Age: 73
End: 2023-12-28
Payer: MEDICARE

## 2023-12-28 DIAGNOSIS — C90.00 MULTIPLE MYELOMA, REMISSION STATUS UNSPECIFIED (MULTI): ICD-10-CM

## 2023-12-28 RX ORDER — LENALIDOMIDE 2.5 MG/1
CAPSULE ORAL
Qty: 28 CAPSULE | Refills: 0 | Status: SHIPPED | OUTPATIENT
Start: 2023-12-28 | End: 2024-01-31 | Stop reason: SDUPTHER

## 2023-12-28 NOTE — PROGRESS NOTES
ONCOLOGY CLINICAL PHARMACY NOTE     Subjective  Varinder Lizama is a 73 y.o. male with MM, here for refill support.        Treatment history  [No matching plan found]     Objective  There were no vitals taken for this visit.  Lab Results   Component Value Date    WBC 2.9 (L) 10/19/2023    HGB 9.7 (L) 10/19/2023    HCT 30.2 (L) 10/19/2023    MCV 87 10/19/2023     10/19/2023      Lab Results   Component Value Date    GLUCOSE 128 (H) 10/19/2023    CALCIUM 9.5 10/19/2023     10/19/2023    K 3.1 (L) 10/19/2023    CO2 29 10/19/2023     10/19/2023    BUN 17 10/19/2023    CREATININE 1.31 (H) 10/19/2023     Lab Results   Component Value Date    ALT 9 (L) 10/19/2023    AST 11 10/19/2023    ALKPHOS 67 10/19/2023    BILITOT 0.8 10/19/2023       Allergies and Medications   Allergies   Allergen Reactions    Lenalidomide Unknown    Lisinopril Angioedema       Current Outpatient Medications:     acyclovir (Zovirax) 400 mg tablet, Take 1 tablet (400 mg) by mouth 2 times a day., Disp: 180 tablet, Rfl: 2    amLODIPine (Norvasc) 10 mg tablet, Take 1 tablet (10 mg) by mouth once daily., Disp: , Rfl:     ammonium lactate (Amlactin) 12 % cream, Apply topically if needed for dry skin., Disp: 385 g, Rfl: 3    aspirin 81 mg EC tablet, Take 1 tablet (81 mg) by mouth once daily., Disp: 90 tablet, Rfl: 2    benztropine (Cogentin) 1 mg tablet, Take by mouth., Disp: , Rfl:     Calcium 600 + D,3, 600 mg-10 mcg (400 unit) tablet, Take 1 tablet by mouth 2 times a day., Disp: 180 tablet, Rfl: 2    dapsone 100 mg tablet, Take 1 tablet (100 mg) by mouth once daily., Disp: , Rfl:     EPINEPHrine (EpiPen 2-Brett) 0.3 mg/0.3 mL injection syringe, Use as directed PRN, Disp: , Rfl:     famotidine (Pepcid) 40 mg tablet, Take 1 tablet (40 mg) by mouth once daily at bedtime., Disp: , Rfl:     lenalidomide (Revlimid) 2.5 mg capsule, Take one capsule by mouth once daily for 28 days (28-day cycle), Disp: 28 capsule, Rfl: 0    OLANZapine (ZyPREXA) 20  mg tablet, Take 1 tablet (20 mg) by mouth once daily at bedtime., Disp: , Rfl:     potassium chloride CR 10 mEq ER tablet, Take 2 tablets (20 mEq) by mouth 3 times a day., Disp: 810 tablet, Rfl: 2    trihexyphenidyl (Artane) 2 mg tablet, Take 0.5 tablets (1 mg) by mouth once daily at bedtime., Disp: , Rfl:     Assessment and Plan  Varinder Lizama is a 73 y.o. male with MM, to be treated with lenalidomide.    Per Dr. Cutler and Trent Davis's authorization, on 12/28/2023, I refilled lenalidomide 2.5 mg PO once daily for 28 days of a 28 day cycle. #28, 0 RF. Auth # ?[id number] obtained, and the prescription was sent to [facility].     Patient is on Aspirin 81 mg QD for thromboembolic prophylaxis.        Enoch Reece, PharmD

## 2023-12-29 ENCOUNTER — TELEPHONE (OUTPATIENT)
Dept: ADMISSION | Facility: HOSPITAL | Age: 73
End: 2023-12-29
Payer: MEDICARE

## 2024-01-18 ENCOUNTER — OFFICE VISIT (OUTPATIENT)
Dept: HEMATOLOGY/ONCOLOGY | Facility: HOSPITAL | Age: 74
End: 2024-01-18
Payer: MEDICARE

## 2024-01-18 ENCOUNTER — LAB (OUTPATIENT)
Dept: LAB | Facility: HOSPITAL | Age: 74
End: 2024-01-18
Payer: MEDICARE

## 2024-01-18 VITALS
DIASTOLIC BLOOD PRESSURE: 57 MMHG | HEIGHT: 69 IN | SYSTOLIC BLOOD PRESSURE: 145 MMHG | TEMPERATURE: 96.4 F | HEART RATE: 85 BPM | WEIGHT: 146.5 LBS | RESPIRATION RATE: 16 BRPM | BODY MASS INDEX: 21.7 KG/M2 | OXYGEN SATURATION: 95 %

## 2024-01-18 DIAGNOSIS — C90.00 MULTIPLE MYELOMA, REMISSION STATUS UNSPECIFIED (MULTI): ICD-10-CM

## 2024-01-18 LAB
ALBUMIN SERPL BCP-MCNC: 4.1 G/DL (ref 3.4–5)
ALP SERPL-CCNC: 58 U/L (ref 33–136)
ALT SERPL W P-5'-P-CCNC: 7 U/L (ref 10–52)
ANION GAP SERPL CALC-SCNC: 11 MMOL/L (ref 10–20)
AST SERPL W P-5'-P-CCNC: 10 U/L (ref 9–39)
BASOPHILS # BLD AUTO: 0.02 X10*3/UL (ref 0–0.1)
BASOPHILS NFR BLD AUTO: 0.8 %
BILIRUB SERPL-MCNC: 1 MG/DL (ref 0–1.2)
BUN SERPL-MCNC: 12 MG/DL (ref 6–23)
CALCIUM SERPL-MCNC: 9.5 MG/DL (ref 8.6–10.3)
CHLORIDE SERPL-SCNC: 103 MMOL/L (ref 98–107)
CO2 SERPL-SCNC: 32 MMOL/L (ref 21–32)
CREAT SERPL-MCNC: 1.22 MG/DL (ref 0.5–1.3)
DACRYOCYTES BLD QL SMEAR: NORMAL
EGFRCR SERPLBLD CKD-EPI 2021: 63 ML/MIN/1.73M*2
EOSINOPHIL # BLD AUTO: 0.05 X10*3/UL (ref 0–0.4)
EOSINOPHIL NFR BLD AUTO: 2.1 %
ERYTHROCYTE [DISTWIDTH] IN BLOOD BY AUTOMATED COUNT: 16.9 % (ref 11.5–14.5)
GLUCOSE SERPL-MCNC: 104 MG/DL (ref 74–99)
HCT VFR BLD AUTO: 31 % (ref 41–52)
HGB BLD-MCNC: 9.8 G/DL (ref 13.5–17.5)
IGA SERPL-MCNC: 154 MG/DL (ref 70–400)
IGG SERPL-MCNC: 1210 MG/DL (ref 700–1600)
IGM SERPL-MCNC: 94 MG/DL (ref 40–230)
IMM GRANULOCYTES # BLD AUTO: 0.01 X10*3/UL (ref 0–0.5)
IMM GRANULOCYTES NFR BLD AUTO: 0.4 % (ref 0–0.9)
KAPPA LC SERPL-MCNC: 3.67 MG/DL (ref 0.33–1.94)
KAPPA LC/LAMBDA SER: 1.54 {RATIO} (ref 0.26–1.65)
LAMBDA LC SERPL-MCNC: 2.39 MG/DL (ref 0.57–2.63)
LYMPHOCYTES # BLD AUTO: 1.25 X10*3/UL (ref 0.8–3)
LYMPHOCYTES NFR BLD AUTO: 53 %
MCH RBC QN AUTO: 27.9 PG (ref 26–34)
MCHC RBC AUTO-ENTMCNC: 31.6 G/DL (ref 32–36)
MCV RBC AUTO: 88 FL (ref 80–100)
MONOCYTES # BLD AUTO: 0.15 X10*3/UL (ref 0.05–0.8)
MONOCYTES NFR BLD AUTO: 6.4 %
NEUTROPHILS # BLD AUTO: 0.88 X10*3/UL (ref 1.6–5.5)
NEUTROPHILS NFR BLD AUTO: 37.3 %
NRBC BLD-RTO: 0 /100 WBCS (ref 0–0)
PLATELET # BLD AUTO: 120 X10*3/UL (ref 150–450)
POLYCHROMASIA BLD QL SMEAR: NORMAL
POTASSIUM SERPL-SCNC: 3.4 MMOL/L (ref 3.5–5.3)
PROT SERPL-MCNC: 6.4 G/DL (ref 6.4–8.2)
PROT SERPL-MCNC: 6.5 G/DL (ref 6.4–8.2)
RBC # BLD AUTO: 3.51 X10*6/UL (ref 4.5–5.9)
RBC MORPH BLD: NORMAL
SCHISTOCYTES BLD QL SMEAR: NORMAL
SODIUM SERPL-SCNC: 143 MMOL/L (ref 136–145)
VIT B12 SERPL-MCNC: 270 PG/ML (ref 211–911)
WBC # BLD AUTO: 2.4 X10*3/UL (ref 4.4–11.3)

## 2024-01-18 PROCEDURE — 99215 OFFICE O/P EST HI 40 MIN: CPT | Performed by: INTERNAL MEDICINE

## 2024-01-18 PROCEDURE — 86320 SERUM IMMUNOELECTROPHORESIS: CPT | Performed by: STUDENT IN AN ORGANIZED HEALTH CARE EDUCATION/TRAINING PROGRAM

## 2024-01-18 PROCEDURE — 1036F TOBACCO NON-USER: CPT | Performed by: INTERNAL MEDICINE

## 2024-01-18 PROCEDURE — 3078F DIAST BP <80 MM HG: CPT | Performed by: INTERNAL MEDICINE

## 2024-01-18 PROCEDURE — 1160F RVW MEDS BY RX/DR IN RCRD: CPT | Performed by: INTERNAL MEDICINE

## 2024-01-18 PROCEDURE — 83521 IG LIGHT CHAINS FREE EACH: CPT

## 2024-01-18 PROCEDURE — 1126F AMNT PAIN NOTED NONE PRSNT: CPT | Performed by: INTERNAL MEDICINE

## 2024-01-18 PROCEDURE — 84155 ASSAY OF PROTEIN SERUM: CPT | Mod: 59

## 2024-01-18 PROCEDURE — 84075 ASSAY ALKALINE PHOSPHATASE: CPT

## 2024-01-18 PROCEDURE — 82607 VITAMIN B-12: CPT

## 2024-01-18 PROCEDURE — 3077F SYST BP >= 140 MM HG: CPT | Performed by: INTERNAL MEDICINE

## 2024-01-18 PROCEDURE — 86334 IMMUNOFIX E-PHORESIS SERUM: CPT

## 2024-01-18 PROCEDURE — 82784 ASSAY IGA/IGD/IGG/IGM EACH: CPT

## 2024-01-18 PROCEDURE — 84165 PROTEIN E-PHORESIS SERUM: CPT | Performed by: STUDENT IN AN ORGANIZED HEALTH CARE EDUCATION/TRAINING PROGRAM

## 2024-01-18 PROCEDURE — 36415 COLL VENOUS BLD VENIPUNCTURE: CPT

## 2024-01-18 PROCEDURE — 85025 COMPLETE CBC W/AUTO DIFF WBC: CPT

## 2024-01-18 PROCEDURE — 1159F MED LIST DOCD IN RCRD: CPT | Performed by: INTERNAL MEDICINE

## 2024-01-18 RX ORDER — ACYCLOVIR 400 MG/1
400 TABLET ORAL 2 TIMES DAILY
Qty: 180 TABLET | Refills: 2 | Status: SHIPPED | OUTPATIENT
Start: 2024-01-18 | End: 2024-10-14

## 2024-01-18 RX ORDER — FAMOTIDINE 40 MG/1
40 TABLET, FILM COATED ORAL NIGHTLY
Qty: 90 TABLET | Refills: 1 | Status: SHIPPED | OUTPATIENT
Start: 2024-01-18

## 2024-01-18 ASSESSMENT — PAIN SCALES - GENERAL: PAINLEVEL: 0-NO PAIN

## 2024-01-18 ASSESSMENT — ENCOUNTER SYMPTOMS
PSYCHIATRIC NEGATIVE: 1
CARDIOVASCULAR NEGATIVE: 1
HEMATOLOGIC/LYMPHATIC NEGATIVE: 1
GASTROINTESTINAL NEGATIVE: 1
RESPIRATORY NEGATIVE: 1
MUSCULOSKELETAL NEGATIVE: 1
DEPRESSION: 0
ALLERGIC/IMMUNOLOGIC NEGATIVE: 1
ENDOCRINE NEGATIVE: 1
CONSTITUTIONAL NEGATIVE: 1
EYES NEGATIVE: 1
LOSS OF SENSATION IN FEET: 0

## 2024-01-18 NOTE — PROGRESS NOTES
Patient ID: Varinder Lizama is a 73 y.o. male.  Referring Physician: Enoch Davis, APRN-CNP  [id number] [place] [address]  Primary Care Provider: Zeynep Zavala LPN    Date of Service:  1/18/2024    ASSESSMENT and PLAN:    Multiple Myeloma, IgG lambda  On Revlimid 2.5 mg maintenance.  - pancytopenia -> will change to 3 wks on 1 wk off. If pancytopenia persists, discontinue Revlimid and consider bone marrow biopsy to rule out MDS         Oncology History Overview Note   Smoldering multiple myeloma, ISS stage II:  Characterized by IgG lambda-restricted paraprotein, initially 2.9 g/dL without evidence of end-organ damage. No lytic bone lesions. No anemia. No hypercalcemia. No renal dysfunction. 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 His kappa lambda light chains are low risk. He also has microcytosis likely secondary to alpha-thalassemia  versus iron deficiency.     Symptomatic Multiple Myeloma ISS stage II   Characterized by anemia noted in 8/2016    Lenalidomide (25 mg daily 21 days on 7 days off) plus Dexamethasone (40 mg once weekly) started on 9/15/2016, stopped briefly due to rash restarted on 12/17/2016    Started maintenance Lenalidomide 10 mg  5/2017 every other day decreasing to 2.5 mg daily 12/2017        Multiple myeloma (CMS/HCC)   10/20/2023 Initial Diagnosis    Multiple myeloma (CMS/HCC)        SUBJECTIVE:  History of Present Illness:  Varinder presents to clinic 1/18/24 for a follow up visit with his friend Snow.     Overall he is doing well.    He denies recent f/c/n/v/d or new pain, night sweats.        Review of Systems   Constitutional: Negative.    HENT: Negative.     Eyes: Negative.    Respiratory: Negative.     Cardiovascular: Negative.    Gastrointestinal: Negative.    Endocrine: Negative.    Genitourinary: Negative.    Musculoskeletal: Negative.    Skin:  "Negative.    Allergic/Immunologic: Negative.    Neurological:  Negative for weakness.   Hematological: Negative.    Psychiatric/Behavioral: Negative.         OBJECTIVE:  KPS: Karnofsky Score: 80 - Normal activity with effort; some signs or symptoms of disease   VS:  /57 (BP Location: Left arm, Patient Position: Sitting, BP Cuff Size: Adult)   Pulse 85   Temp 35.8 °C (96.4 °F) (Temporal)   Resp 16   Ht 1.746 m (5' 8.74\")   Wt 66.5 kg (146 lb 8 oz)   SpO2 95%   BMI 21.80 kg/m²   BSA: 1.8 meters squared    Physical Exam  Constitutional:       Appearance: Normal appearance. He is normal weight.   HENT:      Head: Normocephalic.      Nose: Nose normal.      Mouth/Throat:      Mouth: Mucous membranes are moist.      Pharynx: Oropharynx is clear.   Eyes:      Extraocular Movements: Extraocular movements intact.      Conjunctiva/sclera: Conjunctivae normal.      Pupils: Pupils are equal, round, and reactive to light.   Cardiovascular:      Rate and Rhythm: Normal rate and regular rhythm.      Pulses: Normal pulses.      Heart sounds: Normal heart sounds.   Pulmonary:      Effort: Pulmonary effort is normal.      Breath sounds: Normal breath sounds.   Abdominal:      General: Abdomen is flat. Bowel sounds are normal.      Palpations: Abdomen is soft.   Musculoskeletal:         General: Normal range of motion.      Cervical back: Normal range of motion and neck supple.      Comments: Swollen L hand   Skin:     General: Skin is warm and dry.      Comments: Dry skin, especially on feet   Neurological:      General: No focal deficit present.      Mental Status: He is alert and oriented to person, place, and time. Mental status is at baseline.      Motor: No weakness.      Gait: Gait abnormal.   Psychiatric:         Mood and Affect: Mood normal.         Behavior: Behavior normal.         Thought Content: Thought content normal.         Judgment: Judgment normal.         Laboratory:  The pertinent laboratory results " were reviewed and discussed with the patient.    RTC: 2 months  Josephine Cutler MD

## 2024-01-21 ASSESSMENT — ENCOUNTER SYMPTOMS: WEAKNESS: 0

## 2024-01-22 ENCOUNTER — APPOINTMENT (OUTPATIENT)
Dept: PODIATRY | Facility: CLINIC | Age: 74
End: 2024-01-22
Payer: MEDICARE

## 2024-01-22 ENCOUNTER — ONCOLOGY MEDICATION OUTREACH (OUTPATIENT)
Dept: HEMATOLOGY/ONCOLOGY | Facility: HOSPITAL | Age: 74
End: 2024-01-22

## 2024-01-22 NOTE — PROGRESS NOTES
ONCOLOGY CLINICAL PHARMACY NOTE     Subjective  Varinder Lizama is a 73 y.o. male with MM, here for clinical assessment, education, patient outreach, and refill support.        Treatment history  [No matching plan found]     Objective  There were no vitals taken for this visit.  Lab Results   Component Value Date    WBC 2.4 (L) 01/18/2024    HGB 9.8 (L) 01/18/2024    HCT 31.0 (L) 01/18/2024    MCV 88 01/18/2024     (L) 01/18/2024      Lab Results   Component Value Date    GLUCOSE 104 (H) 01/18/2024    CALCIUM 9.5 01/18/2024     01/18/2024    K 3.4 (L) 01/18/2024    CO2 32 01/18/2024     01/18/2024    BUN 12 01/18/2024    CREATININE 1.22 01/18/2024     Lab Results   Component Value Date    ALT 7 (L) 01/18/2024    AST 10 01/18/2024    ALKPHOS 58 01/18/2024    BILITOT 1.0 01/18/2024       Allergies and Medications   Allergies   Allergen Reactions    Lenalidomide Unknown    Lisinopril Angioedema       Current Outpatient Medications:     acyclovir (Zovirax) 400 mg tablet, Take 1 tablet (400 mg) by mouth 2 times a day., Disp: 180 tablet, Rfl: 2    amLODIPine (Norvasc) 10 mg tablet, Take 1 tablet (10 mg) by mouth once daily., Disp: , Rfl:     ammonium lactate (Amlactin) 12 % cream, Apply topically if needed for dry skin., Disp: 385 g, Rfl: 3    aspirin 81 mg EC tablet, Take 1 tablet (81 mg) by mouth once daily., Disp: 90 tablet, Rfl: 2    benztropine (Cogentin) 1 mg tablet, Take by mouth., Disp: , Rfl:     Calcium 600 + D,3, 600 mg-10 mcg (400 unit) tablet, Take 1 tablet by mouth 2 times a day., Disp: 180 tablet, Rfl: 2    dapsone 100 mg tablet, Take 1 tablet (100 mg) by mouth once daily., Disp: , Rfl:     EPINEPHrine (EpiPen 2-Brett) 0.3 mg/0.3 mL injection syringe, Use as directed PRN, Disp: , Rfl:     famotidine (Pepcid) 40 mg tablet, Take 1 tablet (40 mg) by mouth once daily at bedtime., Disp: 90 tablet, Rfl: 1    lenalidomide (Revlimid) 2.5 mg capsule, Take one capsule by mouth once daily for 28 days  (28-day cycle), Disp: 28 capsule, Rfl: 0    OLANZapine (ZyPREXA) 20 mg tablet, Take 1 tablet (20 mg) by mouth once daily at bedtime., Disp: , Rfl:     potassium chloride CR 10 mEq ER tablet, Take 2 tablets (20 mEq) by mouth 3 times a day., Disp: 810 tablet, Rfl: 2    trihexyphenidyl (Artane) 2 mg tablet, Take 0.5 tablets (1 mg) by mouth once daily at bedtime., Disp: , Rfl:     Assessment and Plan  Varinder Lizama is a 73 y.o. male with MM, to be treated with lenalidomide.    Spoke with patient and wife in clinic, 1/18/2024. Patient is doing well on treatment, no concerning adverse effects to report. Dr. Cutler advised to hold Revlimid for one week, and then resume with change in dosing from once daily for 28 days, to once daily for 21 days on/7 days off. Confirmed with patient, who was understanding of and agreeable to plan.        Enoch Reece, PharmD

## 2024-01-27 LAB
ALBUMIN: 3.9 G/DL (ref 3.4–5)
ALPHA 1 GLOBULIN: 0.3 G/DL (ref 0.2–0.6)
ALPHA 2 GLOBULIN: 0.4 G/DL (ref 0.4–1.1)
BETA GLOBULIN: 0.6 G/DL (ref 0.5–1.2)
GAMMA GLOBULIN: 1.1 G/DL (ref 0.5–1.4)
IMMUNOFIXATION COMMENT: NORMAL
PATH REVIEW - SERUM IMMUNOFIXATION: NORMAL
PATH REVIEW-SERUM PROTEIN ELECTROPHORESIS: NORMAL
PROTEIN ELECTROPHORESIS COMMENT: NORMAL

## 2024-01-30 ENCOUNTER — TELEPHONE (OUTPATIENT)
Dept: HEMATOLOGY/ONCOLOGY | Facility: HOSPITAL | Age: 74
End: 2024-01-30

## 2024-01-30 NOTE — TELEPHONE ENCOUNTER
Dr. Cutler held his treatment for one week and he confirmed he had a two week supply at home. Is the patient or the pharmacy calling?

## 2024-01-31 ENCOUNTER — ONCOLOGY MEDICATION OUTREACH (OUTPATIENT)
Dept: HEMATOLOGY/ONCOLOGY | Facility: HOSPITAL | Age: 74
End: 2024-01-31
Payer: MEDICARE

## 2024-01-31 DIAGNOSIS — C90.00 MULTIPLE MYELOMA, REMISSION STATUS UNSPECIFIED (MULTI): ICD-10-CM

## 2024-01-31 RX ORDER — LENALIDOMIDE 2.5 MG/1
CAPSULE ORAL
Qty: 21 CAPSULE | Refills: 0 | Status: SHIPPED | OUTPATIENT
Start: 2024-01-31

## 2024-01-31 NOTE — PROGRESS NOTES
ONCOLOGY CLINICAL PHARMACY NOTE     Subjective  Varinder Lizama is a 74 y.o. male with MM, here for refill support.        Treatment history  [No matching plan found]     Objective  There were no vitals taken for this visit.  Lab Results   Component Value Date    WBC 2.4 (L) 01/18/2024    HGB 9.8 (L) 01/18/2024    HCT 31.0 (L) 01/18/2024    MCV 88 01/18/2024     (L) 01/18/2024      Lab Results   Component Value Date    GLUCOSE 104 (H) 01/18/2024    CALCIUM 9.5 01/18/2024     01/18/2024    K 3.4 (L) 01/18/2024    CO2 32 01/18/2024     01/18/2024    BUN 12 01/18/2024    CREATININE 1.22 01/18/2024     Lab Results   Component Value Date    ALT 7 (L) 01/18/2024    AST 10 01/18/2024    ALKPHOS 58 01/18/2024    BILITOT 1.0 01/18/2024       Allergies and Medications   Allergies   Allergen Reactions    Lenalidomide Unknown    Lisinopril Angioedema       Current Outpatient Medications:     acyclovir (Zovirax) 400 mg tablet, Take 1 tablet (400 mg) by mouth 2 times a day., Disp: 180 tablet, Rfl: 2    amLODIPine (Norvasc) 10 mg tablet, Take 1 tablet (10 mg) by mouth once daily., Disp: , Rfl:     ammonium lactate (Amlactin) 12 % cream, Apply topically if needed for dry skin., Disp: 385 g, Rfl: 3    aspirin 81 mg EC tablet, Take 1 tablet (81 mg) by mouth once daily., Disp: 90 tablet, Rfl: 2    benztropine (Cogentin) 1 mg tablet, Take by mouth., Disp: , Rfl:     Calcium 600 + D,3, 600 mg-10 mcg (400 unit) tablet, Take 1 tablet by mouth 2 times a day., Disp: 180 tablet, Rfl: 2    dapsone 100 mg tablet, Take 1 tablet (100 mg) by mouth once daily., Disp: , Rfl:     EPINEPHrine (EpiPen 2-Brett) 0.3 mg/0.3 mL injection syringe, Use as directed PRN, Disp: , Rfl:     famotidine (Pepcid) 40 mg tablet, Take 1 tablet (40 mg) by mouth once daily at bedtime., Disp: 90 tablet, Rfl: 1    lenalidomide (Revlimid) 2.5 mg capsule, Take one capsule by mouth once daily for 21 days on, 7 days off (28-day cycle), Disp: 21 capsule, Rfl: 0     OLANZapine (ZyPREXA) 20 mg tablet, Take 1 tablet (20 mg) by mouth once daily at bedtime., Disp: , Rfl:     potassium chloride CR 10 mEq ER tablet, Take 2 tablets (20 mEq) by mouth 3 times a day., Disp: 810 tablet, Rfl: 2    trihexyphenidyl (Artane) 2 mg tablet, Take 0.5 tablets (1 mg) by mouth once daily at bedtime., Disp: , Rfl:     Assessment and Plan  Varinder Lizama is a 74 y.o. male with MM, to be treated with lenalidomide.    Per Dr. Cutler and Trent Davis's authorization, on 1/31/2024, I refilled lenalidomide 2.5 mg PO once daily for 21 days on, then 7 days off, for a 28 day cycle. #21, 0 RF. Auth # [id number] obtained, and the prescription was sent to [facility].    Dr. Cutler endorsed changing dose to 21 days on, 7 days off on 1/18/2024.     Patient is on Aspirin 81 mg QD for thromboembolic prophylaxis.      Enoch Reece, PharmD

## 2024-02-15 ENCOUNTER — LAB (OUTPATIENT)
Dept: LAB | Facility: HOSPITAL | Age: 74
End: 2024-02-15
Payer: MEDICARE

## 2024-02-15 ENCOUNTER — OFFICE VISIT (OUTPATIENT)
Dept: HEMATOLOGY/ONCOLOGY | Facility: HOSPITAL | Age: 74
End: 2024-02-15
Payer: MEDICARE

## 2024-02-15 VITALS
TEMPERATURE: 97.5 F | HEART RATE: 82 BPM | OXYGEN SATURATION: 94 % | DIASTOLIC BLOOD PRESSURE: 97 MMHG | SYSTOLIC BLOOD PRESSURE: 143 MMHG | RESPIRATION RATE: 17 BRPM

## 2024-02-15 DIAGNOSIS — D61.818 PANCYTOPENIA (MULTI): ICD-10-CM

## 2024-02-15 DIAGNOSIS — C90.00 MULTIPLE MYELOMA NOT HAVING ACHIEVED REMISSION (MULTI): ICD-10-CM

## 2024-02-15 DIAGNOSIS — C90.00 MULTIPLE MYELOMA, REMISSION STATUS UNSPECIFIED (MULTI): ICD-10-CM

## 2024-02-15 DIAGNOSIS — Z51.11 ENCOUNTER FOR CHEMOTHERAPY MANAGEMENT: ICD-10-CM

## 2024-02-15 DIAGNOSIS — C90.00 MULTIPLE MYELOMA NOT HAVING ACHIEVED REMISSION (MULTI): Primary | ICD-10-CM

## 2024-02-15 LAB
ALBUMIN SERPL BCP-MCNC: 4.1 G/DL (ref 3.4–5)
ALP SERPL-CCNC: 61 U/L (ref 33–136)
ALT SERPL W P-5'-P-CCNC: 8 U/L (ref 10–52)
ANION GAP SERPL CALC-SCNC: 11 MMOL/L (ref 10–20)
AST SERPL W P-5'-P-CCNC: 12 U/L (ref 9–39)
BASOPHILS # BLD AUTO: 0.01 X10*3/UL (ref 0–0.1)
BASOPHILS NFR BLD AUTO: 0.4 %
BILIRUB SERPL-MCNC: 0.9 MG/DL (ref 0–1.2)
BUN SERPL-MCNC: 17 MG/DL (ref 6–23)
CALCIUM SERPL-MCNC: 9.1 MG/DL (ref 8.6–10.3)
CHLORIDE SERPL-SCNC: 105 MMOL/L (ref 98–107)
CO2 SERPL-SCNC: 33 MMOL/L (ref 21–32)
CREAT SERPL-MCNC: 1.28 MG/DL (ref 0.5–1.3)
EGFRCR SERPLBLD CKD-EPI 2021: 59 ML/MIN/1.73M*2
EOSINOPHIL # BLD AUTO: 0.02 X10*3/UL (ref 0–0.4)
EOSINOPHIL NFR BLD AUTO: 0.7 %
ERYTHROCYTE [DISTWIDTH] IN BLOOD BY AUTOMATED COUNT: 16.8 % (ref 11.5–14.5)
GLUCOSE SERPL-MCNC: 101 MG/DL (ref 74–99)
HCT VFR BLD AUTO: 29.2 % (ref 41–52)
HGB BLD-MCNC: 9.3 G/DL (ref 13.5–17.5)
IGA SERPL-MCNC: 131 MG/DL (ref 70–400)
IGG SERPL-MCNC: 1170 MG/DL (ref 700–1600)
IGM SERPL-MCNC: 70 MG/DL (ref 40–230)
IMM GRANULOCYTES # BLD AUTO: 0 X10*3/UL (ref 0–0.5)
IMM GRANULOCYTES NFR BLD AUTO: 0 % (ref 0–0.9)
KAPPA LC SERPL-MCNC: 3.28 MG/DL (ref 0.33–1.94)
KAPPA LC/LAMBDA SER: 1.47 {RATIO} (ref 0.26–1.65)
LAMBDA LC SERPL-MCNC: 2.23 MG/DL (ref 0.57–2.63)
LYMPHOCYTES # BLD AUTO: 1.29 X10*3/UL (ref 0.8–3)
LYMPHOCYTES NFR BLD AUTO: 48 %
MCH RBC QN AUTO: 28.6 PG (ref 26–34)
MCHC RBC AUTO-ENTMCNC: 31.8 G/DL (ref 32–36)
MCV RBC AUTO: 90 FL (ref 80–100)
MONOCYTES # BLD AUTO: 0.15 X10*3/UL (ref 0.05–0.8)
MONOCYTES NFR BLD AUTO: 5.6 %
NEUTROPHILS # BLD AUTO: 1.22 X10*3/UL (ref 1.6–5.5)
NEUTROPHILS NFR BLD AUTO: 45.3 %
NRBC BLD-RTO: 0 /100 WBCS (ref 0–0)
PLATELET # BLD AUTO: 101 X10*3/UL (ref 150–450)
POTASSIUM SERPL-SCNC: 3.5 MMOL/L (ref 3.5–5.3)
PROT SERPL-MCNC: 6.4 G/DL (ref 6.4–8.2)
PROT SERPL-MCNC: 6.5 G/DL (ref 6.4–8.2)
RBC # BLD AUTO: 3.25 X10*6/UL (ref 4.5–5.9)
SODIUM SERPL-SCNC: 145 MMOL/L (ref 136–145)
WBC # BLD AUTO: 2.7 X10*3/UL (ref 4.4–11.3)

## 2024-02-15 PROCEDURE — 1159F MED LIST DOCD IN RCRD: CPT

## 2024-02-15 PROCEDURE — 1036F TOBACCO NON-USER: CPT

## 2024-02-15 PROCEDURE — 99215 OFFICE O/P EST HI 40 MIN: CPT

## 2024-02-15 PROCEDURE — 36415 COLL VENOUS BLD VENIPUNCTURE: CPT

## 2024-02-15 PROCEDURE — 3080F DIAST BP >= 90 MM HG: CPT

## 2024-02-15 PROCEDURE — 84165 PROTEIN E-PHORESIS SERUM: CPT | Performed by: STUDENT IN AN ORGANIZED HEALTH CARE EDUCATION/TRAINING PROGRAM

## 2024-02-15 PROCEDURE — 82784 ASSAY IGA/IGD/IGG/IGM EACH: CPT

## 2024-02-15 PROCEDURE — 84165 PROTEIN E-PHORESIS SERUM: CPT

## 2024-02-15 PROCEDURE — 84155 ASSAY OF PROTEIN SERUM: CPT | Mod: 59

## 2024-02-15 PROCEDURE — 1126F AMNT PAIN NOTED NONE PRSNT: CPT

## 2024-02-15 PROCEDURE — 3077F SYST BP >= 140 MM HG: CPT

## 2024-02-15 PROCEDURE — 83521 IG LIGHT CHAINS FREE EACH: CPT

## 2024-02-15 PROCEDURE — 80053 COMPREHEN METABOLIC PANEL: CPT

## 2024-02-15 PROCEDURE — 1160F RVW MEDS BY RX/DR IN RCRD: CPT

## 2024-02-15 PROCEDURE — 85025 COMPLETE CBC W/AUTO DIFF WBC: CPT

## 2024-02-15 PROCEDURE — 86320 SERUM IMMUNOELECTROPHORESIS: CPT | Performed by: STUDENT IN AN ORGANIZED HEALTH CARE EDUCATION/TRAINING PROGRAM

## 2024-02-15 RX ORDER — POTASSIUM CHLORIDE 750 MG/1
20 TABLET, EXTENDED RELEASE ORAL 3 TIMES DAILY
Qty: 540 TABLET | Refills: 3 | Status: SHIPPED | OUTPATIENT
Start: 2024-02-15 | End: 2024-02-15 | Stop reason: SDUPTHER

## 2024-02-15 RX ORDER — POTASSIUM CHLORIDE 750 MG/1
20 TABLET, EXTENDED RELEASE ORAL 3 TIMES DAILY
Qty: 540 TABLET | Refills: 3 | Status: SHIPPED | OUTPATIENT
Start: 2024-02-15 | End: 2025-02-09

## 2024-02-15 ASSESSMENT — ENCOUNTER SYMPTOMS
ENDOCRINE NEGATIVE: 1
MUSCULOSKELETAL NEGATIVE: 1
GASTROINTESTINAL NEGATIVE: 1
HEMATOLOGIC/LYMPHATIC NEGATIVE: 1
EYES NEGATIVE: 1
CONSTITUTIONAL NEGATIVE: 1
ALLERGIC/IMMUNOLOGIC NEGATIVE: 1
PSYCHIATRIC NEGATIVE: 1
WEAKNESS: 0
CARDIOVASCULAR NEGATIVE: 1
RESPIRATORY NEGATIVE: 1

## 2024-02-15 ASSESSMENT — PAIN SCALES - GENERAL: PAINLEVEL: 0-NO PAIN

## 2024-02-15 NOTE — PROGRESS NOTES
Patient ID: Varinder Lizama is a 74 y.o. male.  Referring Physician: No referring provider defined for this encounter.  Primary Care Provider: Zeynep Zavala LPN  Date of Service:  2/15/2024       Oncology History Overview Note   Smoldering multiple myeloma, ISS stage II:  Characterized by IgG lambda-restricted paraprotein, initially 2.9 g/dL without evidence of end-organ damage. No lytic bone lesions. No anemia. No hypercalcemia. No renal dysfunction. 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 His kappa lambda light chains are low risk. He also has microcytosis likely secondary to alpha-thalassemia  versus iron deficiency.     Symptomatic Multiple Myeloma ISS stage II   Characterized by anemia noted in 8/2016    Lenalidomide (25 mg daily 21 days on 7 days off) plus Dexamethasone (40 mg once weekly) started on 9/15/2016, stopped briefly due to rash restarted on 12/17/2016    Started maintenance Lenalidomide 10 mg  5/2017 every other day decreasing to 2.5 mg daily 12/2017        Multiple myeloma (CMS/HCC)   10/20/2023 Initial Diagnosis    Multiple myeloma (CMS/HCC)        SUBJECTIVE:  History of Present Illness:  Varinder presents to clinic 2/15/24 for a follow up visit with his friend Snow.     Overall he is doing well. No acute complaints at this time.       Review of Systems   Constitutional: Negative.    HENT: Negative.     Eyes: Negative.    Respiratory: Negative.     Cardiovascular: Negative.    Gastrointestinal: Negative.    Endocrine: Negative.    Genitourinary: Negative.    Musculoskeletal: Negative.    Skin: Negative.    Allergic/Immunologic: Negative.    Neurological:  Negative for weakness.   Hematological: Negative.    Psychiatric/Behavioral: Negative.         OBJECTIVE:  KPS: Karnofsky Score: 80 - Normal activity with effort; some signs or symptoms of disease   VS:  BP (!) 143/97    Pulse 82   Temp 36.4 °C (97.5 °F)   Resp 17   SpO2 94%   BSA: There is no height or weight on file to calculate BSA.    Physical Exam  Constitutional:       Appearance: Normal appearance. He is normal weight.   HENT:      Head: Normocephalic.      Nose: Nose normal.      Mouth/Throat:      Mouth: Mucous membranes are moist.      Pharynx: Oropharynx is clear.   Eyes:      Extraocular Movements: Extraocular movements intact.      Conjunctiva/sclera: Conjunctivae normal.      Pupils: Pupils are equal, round, and reactive to light.   Cardiovascular:      Rate and Rhythm: Normal rate and regular rhythm.      Pulses: Normal pulses.      Heart sounds: Normal heart sounds.   Pulmonary:      Effort: Pulmonary effort is normal.      Breath sounds: Normal breath sounds.   Abdominal:      General: Abdomen is flat. Bowel sounds are normal.      Palpations: Abdomen is soft.   Musculoskeletal:         General: Normal range of motion.      Cervical back: Normal range of motion and neck supple.   Skin:     General: Skin is warm and dry.      Comments: Dry skin, especially on feet   Neurological:      General: No focal deficit present.      Mental Status: He is alert and oriented to person, place, and time. Mental status is at baseline.      Motor: No weakness.      Gait: Gait abnormal.   Psychiatric:         Mood and Affect: Mood normal.         Behavior: Behavior normal.         Thought Content: Thought content normal.         Judgment: Judgment normal.       Laboratory:  The pertinent laboratory results were reviewed and discussed with the patient.    ASSESSMENT and PLAN:    Multiple Myeloma, IgG lambda, initially diagnosed in 7/2012  - Smoldering MM-> progressed in 8/2016 R-ISS stage II  - started on Rd on 9/15/16-> dose reduction>>> 2.5 mg currently  - Last bone survey in 5/2021: Small lesion of the right infra glenoid scapula unchanged from multiple prior studies. No new osseous lesions identified.  - Recent SPEP/IF: no  measurable paraprotein  - Last UPEP/IF in 5/2016-> 182.4 mg/L IgG lambda  - Continue Revlimid 2.5mg   - Zometa last 11/18/21, no plans to resume at this time  - cont to monitor Myeloma panel every 2 months.  - Negative PET/CT 11/7/23  - Revlimid decreased to 3 weeks on 1 week off 1/2024  - HOLD Revlimid as of 2/15/24     Pancytopenia  - Partially due to Revlimid  - stable for past >5 years  - BMBx to scheduled for 2/22/24 to evaluate      Vitamin B12:  - Last injection 11/18/21     CKD  since 7/2017  - stable     Hypokalemia:  - Taking 20meq K TID  - K 3.1, pt. Stated he was only taking 20meq BID, encouraged TID dosing  - discontinued HCTZ  - advised to see PCP for HTN meds    Podiatry:  - Dry skin on feet, nails need care  - Referral to podiatry     RTC:   BMBx 2/22  Dr. Cutler 2/29    MASON Bazzi-CNP

## 2024-02-16 PROBLEM — D61.818 PANCYTOPENIA (MULTI): Status: ACTIVE | Noted: 2023-10-18

## 2024-02-21 LAB
ALBUMIN: 3.9 G/DL (ref 3.4–5)
ALPHA 1 GLOBULIN: 0.3 G/DL (ref 0.2–0.6)
ALPHA 2 GLOBULIN: 0.5 G/DL (ref 0.4–1.1)
BETA GLOBULIN: 0.6 G/DL (ref 0.5–1.2)
GAMMA GLOBULIN: 1.1 G/DL (ref 0.5–1.4)
IMMUNOFIXATION COMMENT: NORMAL
PATH REVIEW - SERUM IMMUNOFIXATION: NORMAL
PATH REVIEW-SERUM PROTEIN ELECTROPHORESIS: NORMAL
PROTEIN ELECTROPHORESIS COMMENT: NORMAL

## 2024-02-22 ENCOUNTER — APPOINTMENT (OUTPATIENT)
Dept: LAB | Facility: HOSPITAL | Age: 74
End: 2024-02-22
Payer: MEDICARE

## 2024-02-22 ENCOUNTER — PROCEDURE VISIT (OUTPATIENT)
Dept: OTHER | Facility: HOSPITAL | Age: 74
End: 2024-02-22
Payer: MEDICARE

## 2024-02-22 VITALS
SYSTOLIC BLOOD PRESSURE: 141 MMHG | DIASTOLIC BLOOD PRESSURE: 64 MMHG | WEIGHT: 146.61 LBS | HEART RATE: 74 BPM | RESPIRATION RATE: 18 BRPM | OXYGEN SATURATION: 97 % | TEMPERATURE: 98.2 F | BODY MASS INDEX: 21.81 KG/M2

## 2024-02-22 DIAGNOSIS — C90.00 MULTIPLE MYELOMA NOT HAVING ACHIEVED REMISSION (MULTI): Primary | ICD-10-CM

## 2024-02-22 DIAGNOSIS — Z00.00 REGULAR CHECK-UP: Primary | ICD-10-CM

## 2024-02-22 LAB
BASOPHILS # BLD AUTO: 0.02 X10*3/UL (ref 0–0.1)
BASOPHILS NFR BLD AUTO: 0.6 %
EOSINOPHIL # BLD AUTO: 0.01 X10*3/UL (ref 0–0.4)
EOSINOPHIL NFR BLD AUTO: 0.3 %
ERYTHROCYTE [DISTWIDTH] IN BLOOD BY AUTOMATED COUNT: 16.3 % (ref 11.5–14.5)
HCT VFR BLD AUTO: 27.4 % (ref 41–52)
HGB BLD-MCNC: 8.9 G/DL (ref 13.5–17.5)
HOLD SPECIMEN: NORMAL
IMM GRANULOCYTES # BLD AUTO: 0 X10*3/UL (ref 0–0.5)
IMM GRANULOCYTES NFR BLD AUTO: 0 % (ref 0–0.9)
LYMPHOCYTES # BLD AUTO: 1.55 X10*3/UL (ref 0.8–3)
LYMPHOCYTES NFR BLD AUTO: 45.5 %
MCH RBC QN AUTO: 29.2 PG (ref 26–34)
MCHC RBC AUTO-ENTMCNC: 32.5 G/DL (ref 32–36)
MCV RBC AUTO: 90 FL (ref 80–100)
MONOCYTES # BLD AUTO: 0.28 X10*3/UL (ref 0.05–0.8)
MONOCYTES NFR BLD AUTO: 8.2 %
NEUTROPHILS # BLD AUTO: 1.55 X10*3/UL (ref 1.6–5.5)
NEUTROPHILS NFR BLD AUTO: 45.4 %
NRBC BLD-RTO: 0 /100 WBCS (ref 0–0)
PLATELET # BLD AUTO: 142 X10*3/UL (ref 150–450)
RBC # BLD AUTO: 3.05 X10*6/UL (ref 4.5–5.9)
WBC # BLD AUTO: 3.4 X10*3/UL (ref 4.4–11.3)

## 2024-02-22 PROCEDURE — 88189 FLOWCYTOMETRY/READ 16 & >: CPT | Performed by: PATHOLOGY

## 2024-02-22 PROCEDURE — 88185 FLOWCYTOMETRY/TC ADD-ON: CPT | Mod: TC

## 2024-02-22 PROCEDURE — 88264 CHROMOSOME ANALYSIS 20-25: CPT

## 2024-02-22 PROCEDURE — 81450 HL NEO GSAP 5-50DNA/DNA&RNA: CPT

## 2024-02-22 PROCEDURE — 85025 COMPLETE CBC W/AUTO DIFF WBC: CPT

## 2024-02-22 PROCEDURE — 88342 IMHCHEM/IMCYTCHM 1ST ANTB: CPT | Performed by: PATHOLOGY

## 2024-02-22 PROCEDURE — 38222 DX BONE MARROW BX & ASPIR: CPT

## 2024-02-22 PROCEDURE — 88305 TISSUE EXAM BY PATHOLOGIST: CPT | Performed by: PATHOLOGY

## 2024-02-22 PROCEDURE — G0452 MOLECULAR PATHOLOGY INTERPR: HCPCS

## 2024-02-22 PROCEDURE — 85097 BONE MARROW INTERPRETATION: CPT | Mod: TC

## 2024-02-22 PROCEDURE — 88341 IMHCHEM/IMCYTCHM EA ADD ANTB: CPT | Performed by: PATHOLOGY

## 2024-02-22 PROCEDURE — 88311 DECALCIFY TISSUE: CPT | Performed by: PATHOLOGY

## 2024-02-22 PROCEDURE — 88313 SPECIAL STAINS GROUP 2: CPT | Performed by: PATHOLOGY

## 2024-02-22 PROCEDURE — 88305 TISSUE EXAM BY PATHOLOGIST: CPT | Mod: TC,SUR

## 2024-02-22 NOTE — PROGRESS NOTES
Patient ID: Varinder Lizama is a 74 y.o. male.    Subjective    HPI  Presents today for follow up visit, accompanied by ***.      Review of Systems - Oncology    Objective    BSA: There is no height or weight on file to calculate BSA.  There were no vitals taken for this visit.  Vital signs reviewed today.      Physical Exam    Performance Status:  {Karnofsky Score:23958:::1}      Assessment/Plan        Oncology History Overview Note   Smoldering multiple myeloma, ISS stage II:  Characterized by IgG lambda-restricted paraprotein, initially 2.9 g/dL without evidence of end-organ damage. No lytic bone lesions. No anemia. No hypercalcemia. No renal dysfunction. 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 His kappa lambda light chains are low risk. He also has microcytosis likely secondary to alpha-thalassemia  versus iron deficiency.     Symptomatic IgG Lambda Multiple Myeloma ISS stage II   Characterized by anemia noted in 8/2016    Lenalidomide (25 mg daily 21 days on 7 days off) plus Dexamethasone (40 mg once weekly) started on 9/15/2016, stopped briefly due to rash restarted on 12/17/2016    Started maintenance Lenalidomide 10 mg  5/2017 every other day decreasing to 2.5 mg daily 12/2017.    PET/CT (11/7/23):   There is no hypermetabolic activity to suggest active focal  neoplastic process. No increased FDG uptake in the right scapula to  suggest disease involvement.    Continued pancytopenia noted through 2023. Revlimid dose reduced to 2.5mg 3 weeks on, 1 week off in 1/2024.     Held Revlimid as of 2/2024 due to continued cytopenias.        Multiple myeloma (CMS/HCC)   10/20/2023 Initial Diagnosis    Multiple myeloma (CMS/HCC)          No problem-specific Assessment & Plan notes found for this encounter.           Oral Tavarez, APRN-CNP

## 2024-02-22 NOTE — PROGRESS NOTES
Patient ID: Varinder Lizama is a 74 y.o. male.  Bone Marrow Biopsy and Aspiration Procedure Note     Informed consent was obtained and potential risks including bleeding, infection and pain were reviewed with the patient.     The left posterior iliac crest was prepped with chlorhexidine.     10 ml of lidocaine 2% followed by 10 ml of lidocaine 1% local anesthesia infiltrated into the subcutaneous tissue.    Left bone marrow biopsy and left bone marrow aspirate was obtained.     The procedure was tolerated well and there were no complications.    Specimens sent for: routine histopathologic stains and sectioning, flow cytometry, cytogenetics, and molecular analysis    Procedure completed by: MASON Cole-YAMILKA

## 2024-02-22 NOTE — PROGRESS NOTES
Patient arrived to ASCT unit via self-ambulation for bone marrow biopsy, accompanied by spouse. He is alert and oriented x3, denies pain or any discomfort. Vital signs obtained. Blood drawn prior to arrival to unit. LATONIA Tavarez performed procedure. Dressing is clean, dry and intact. Education provided and PI sheet given. No adverse reactions, no complaints and no assistance needed.

## 2024-02-26 LAB
CELL COUNT (BLOOD): 5.68 X10*3/UL
CELL POPULATIONS: NORMAL
DIAGNOSIS: NORMAL
FLOW DIFFERENTIAL: NORMAL
FLOW TEST ORDERED: NORMAL
LAB TEST METHOD: NORMAL
NUMBER OF CELLS COLLECTED: NORMAL PER TUBE
PATH REPORT.TOTAL CANCER: NORMAL
SIGNATURE COMMENT: NORMAL
SPECIMEN VIABILITY: NORMAL

## 2024-02-28 LAB
ELECTRONICALLY SIGNED BY: NORMAL
MYELOID NGS RESULTS: NORMAL
PATH REPORT.COMMENTS IMP SPEC: NORMAL
PATH REPORT.FINAL DX SPEC: NORMAL
PATH REPORT.GROSS SPEC: NORMAL
PATH REPORT.MICROSCOPIC SPEC OTHER STN: NORMAL
PATH REPORT.RELEVANT HX SPEC: NORMAL
PATH REPORT.TOTAL CANCER: NORMAL

## 2024-02-29 ENCOUNTER — OFFICE VISIT (OUTPATIENT)
Dept: HEMATOLOGY/ONCOLOGY | Facility: HOSPITAL | Age: 74
End: 2024-02-29
Payer: MEDICARE

## 2024-02-29 VITALS
SYSTOLIC BLOOD PRESSURE: 146 MMHG | HEART RATE: 85 BPM | TEMPERATURE: 97.7 F | WEIGHT: 132.94 LBS | OXYGEN SATURATION: 95 % | DIASTOLIC BLOOD PRESSURE: 75 MMHG | RESPIRATION RATE: 16 BRPM | BODY MASS INDEX: 19.78 KG/M2

## 2024-02-29 DIAGNOSIS — D61.818 PANCYTOPENIA (MULTI): ICD-10-CM

## 2024-02-29 DIAGNOSIS — C90.00 MULTIPLE MYELOMA NOT HAVING ACHIEVED REMISSION (MULTI): Primary | ICD-10-CM

## 2024-02-29 PROCEDURE — 1159F MED LIST DOCD IN RCRD: CPT | Performed by: INTERNAL MEDICINE

## 2024-02-29 PROCEDURE — 1125F AMNT PAIN NOTED PAIN PRSNT: CPT | Performed by: INTERNAL MEDICINE

## 2024-02-29 PROCEDURE — 99215 OFFICE O/P EST HI 40 MIN: CPT | Performed by: INTERNAL MEDICINE

## 2024-02-29 PROCEDURE — 1160F RVW MEDS BY RX/DR IN RCRD: CPT | Performed by: INTERNAL MEDICINE

## 2024-02-29 PROCEDURE — 3078F DIAST BP <80 MM HG: CPT | Performed by: INTERNAL MEDICINE

## 2024-02-29 PROCEDURE — 1036F TOBACCO NON-USER: CPT | Performed by: INTERNAL MEDICINE

## 2024-02-29 PROCEDURE — 3077F SYST BP >= 140 MM HG: CPT | Performed by: INTERNAL MEDICINE

## 2024-02-29 ASSESSMENT — ENCOUNTER SYMPTOMS
CARDIOVASCULAR NEGATIVE: 1
EYES NEGATIVE: 1
ALLERGIC/IMMUNOLOGIC NEGATIVE: 1
CONSTITUTIONAL NEGATIVE: 1
WEAKNESS: 0
PSYCHIATRIC NEGATIVE: 1
HEMATOLOGIC/LYMPHATIC NEGATIVE: 1
ENDOCRINE NEGATIVE: 1
GASTROINTESTINAL NEGATIVE: 1
RESPIRATORY NEGATIVE: 1
MUSCULOSKELETAL NEGATIVE: 1

## 2024-02-29 ASSESSMENT — PAIN SCALES - GENERAL: PAINLEVEL: 1

## 2024-02-29 NOTE — PROGRESS NOTES
Patient ID: Varinder Lizama is a 74 y.o. male.  Referring Physician: No referring provider defined for this encounter.  Primary Care Provider: Zeynep Zavala LPN  Date of Service:  2/29/2024       Oncology History Overview Note   Smoldering multiple myeloma, ISS stage II:  Characterized by IgG lambda-restricted paraprotein, initially 2.9 g/dL without evidence of end-organ damage. No lytic bone lesions. No anemia. No hypercalcemia. No renal dysfunction. 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 His kappa lambda light chains are low risk. He also has microcytosis likely secondary to alpha-thalassemia  versus iron deficiency.     Symptomatic IgG Lambda Multiple Myeloma ISS stage II   Characterized by anemia noted in 8/2016    Lenalidomide (25 mg daily 21 days on 7 days off) plus Dexamethasone (40 mg once weekly) started on 9/15/2016, stopped briefly due to rash restarted on 12/17/2016    Started maintenance Lenalidomide 10 mg  5/2017 every other day decreasing to 2.5 mg daily 12/2017.    PET/CT (11/7/23):   There is no hypermetabolic activity to suggest active focal  neoplastic process. No increased FDG uptake in the right scapula to  suggest disease involvement.    Continued pancytopenia noted through 2023. Revlimid dose reduced to 2.5mg 3 weeks on, 1 week off in 1/2024.     Held Revlimid as of 2/2024 due to continued cytopenias.        Multiple myeloma (CMS/HCC)   10/20/2023 Initial Diagnosis    Multiple myeloma (CMS/HCC)        SUBJECTIVE:  History of Present Illness:  Varinder presents to clinic 2/29/24 for a follow up visit with his friend Snow.   Overall he is doing well. No acute complaints at this time.       Review of Systems   Constitutional: Negative.    HENT: Negative.     Eyes: Negative.    Respiratory: Negative.     Cardiovascular: Negative.    Gastrointestinal: Negative.     Endocrine: Negative.    Genitourinary: Negative.    Musculoskeletal: Negative.    Skin: Negative.    Allergic/Immunologic: Negative.    Neurological:  Negative for weakness.   Hematological: Negative.    Psychiatric/Behavioral: Negative.         OBJECTIVE:  KPS: Karnofsky Score: 80 - Normal activity with effort; some signs or symptoms of disease   VS:  /75 (BP Location: Left arm, Patient Position: Sitting, BP Cuff Size: Adult)   Pulse 85   Temp 36.5 °C (97.7 °F) (Temporal)   Resp 16   Wt 60.3 kg (132 lb 15 oz)   SpO2 95%   BMI 19.78 kg/m²   BSA: 1.71 meters squared    Physical Exam  Constitutional:       Appearance: Normal appearance. He is normal weight.   HENT:      Head: Normocephalic.      Nose: Nose normal.      Mouth/Throat:      Mouth: Mucous membranes are moist.      Pharynx: Oropharynx is clear.   Eyes:      Extraocular Movements: Extraocular movements intact.      Conjunctiva/sclera: Conjunctivae normal.      Pupils: Pupils are equal, round, and reactive to light.   Cardiovascular:      Rate and Rhythm: Normal rate and regular rhythm.      Pulses: Normal pulses.      Heart sounds: Normal heart sounds.   Pulmonary:      Effort: Pulmonary effort is normal.      Breath sounds: Normal breath sounds.   Abdominal:      General: Abdomen is flat. Bowel sounds are normal.      Palpations: Abdomen is soft.   Musculoskeletal:         General: Normal range of motion.      Cervical back: Normal range of motion and neck supple.   Skin:     General: Skin is warm and dry.      Comments: Dry skin, especially on feet   Neurological:      General: No focal deficit present.      Mental Status: He is alert and oriented to person, place, and time. Mental status is at baseline.      Motor: No weakness.      Gait: Gait abnormal.   Psychiatric:         Mood and Affect: Mood normal.         Behavior: Behavior normal.         Thought Content: Thought content normal.         Judgment: Judgment normal.       Bone marrow  biopsy 2/22/24:      -- NORMOCELLULAR BONE MARROW WITH  MATURING TRILINEAGE HEMATOPOESIS AND GRANULOCYTIC HYPOPLASIA, SEE NOTE  -- NO DEFINITE MORPHOLOGIC OR IMMUNOPHENOTYPIC EVIDENCE OF PLASMA MAYA NEOPLASM     NOTE: Granulopoiesis appears decreased but the granulocytes present show complete maturation. The finding is of unclear etiology. Clinical correlation and correlation with pending genetic/molecular studies suggested.  Decreased iron storage    ASSESSMENT and PLAN:    Multiple Myeloma, IgG lambda, initially diagnosed in 7/2012  - Smoldering MM-> progressed in 8/2016 R-ISS stage II  - started on Rd on 9/15/16-> dose reduction>>> 2.5 mg currently  - Last bone survey in 5/2021: Small lesion of the right infra glenoid scapula unchanged from multiple prior studies. No new osseous lesions identified.  - Recent SPEP/IF: no measurable paraprotein  - Last UPEP/IF in 5/2016-> 182.4 mg/L IgG lambda  - Continue Revlimid 2.5mg   - Zometa last 11/18/21, no plans to resume at this time  - cont to monitor Myeloma panel every 2 months.  - Negative PET/CT 11/7/23  - Revlimid decreased to 3 weeks on 1 week off 1/2024  - HOLD Revlimid as of 2/15/24  - continue monitor closely     Pancytopenia  - Partially due to Revlimid  - stable for past >5 years  - BMBx from 2/22/24 as above -genetic/molecular study pending  - recheck B12, MMA, folate, Fe studies     Vitamin B12:  - Last injection 11/18/21     CKD  since 7/2017  - stable     Hypokalemia:  - Taking 20meq K TID  - K 3.1, pt. Stated he was only taking 20meq BID, encouraged TID dosing  - discontinued HCTZ  - advised to see PCP for HTN meds    Podiatry:  - Dry skin on feet, nails need care  - Referral to podiatry     RTC: 4 weeks    OkNoy Cutler MD

## 2024-03-01 ENCOUNTER — TELEPHONE (OUTPATIENT)
Dept: HEMATOLOGY/ONCOLOGY | Facility: HOSPITAL | Age: 74
End: 2024-03-01
Payer: MEDICARE

## 2024-03-01 NOTE — TELEPHONE ENCOUNTER
Left a message for Mr. Lizama asking him to get blood work drawn today, or early next week. Told him to call back with any questions.

## 2024-03-28 ENCOUNTER — LAB (OUTPATIENT)
Dept: LAB | Facility: HOSPITAL | Age: 74
End: 2024-03-28
Payer: MEDICARE

## 2024-03-28 ENCOUNTER — SOCIAL WORK (OUTPATIENT)
Dept: HEMATOLOGY/ONCOLOGY | Facility: HOSPITAL | Age: 74
End: 2024-03-28
Payer: MEDICARE

## 2024-03-28 ENCOUNTER — OFFICE VISIT (OUTPATIENT)
Dept: HEMATOLOGY/ONCOLOGY | Facility: HOSPITAL | Age: 74
End: 2024-03-28
Payer: MEDICARE

## 2024-03-28 VITALS
OXYGEN SATURATION: 91 % | SYSTOLIC BLOOD PRESSURE: 154 MMHG | WEIGHT: 142.64 LBS | TEMPERATURE: 97.7 F | HEART RATE: 76 BPM | BODY MASS INDEX: 21.22 KG/M2 | RESPIRATION RATE: 16 BRPM | DIASTOLIC BLOOD PRESSURE: 75 MMHG

## 2024-03-28 DIAGNOSIS — D61.818 PANCYTOPENIA (MULTI): ICD-10-CM

## 2024-03-28 DIAGNOSIS — C90.00 MULTIPLE MYELOMA, REMISSION STATUS UNSPECIFIED (MULTI): ICD-10-CM

## 2024-03-28 DIAGNOSIS — D61.818 PANCYTOPENIA (MULTI): Primary | ICD-10-CM

## 2024-03-28 DIAGNOSIS — C90.00 MULTIPLE MYELOMA NOT HAVING ACHIEVED REMISSION (MULTI): ICD-10-CM

## 2024-03-28 LAB
BASOPHILS # BLD AUTO: 0 X10*3/UL (ref 0–0.1)
BASOPHILS NFR BLD AUTO: 0 %
EOSINOPHIL # BLD AUTO: 0 X10*3/UL (ref 0–0.4)
EOSINOPHIL NFR BLD AUTO: 0 %
ERYTHROCYTE [DISTWIDTH] IN BLOOD BY AUTOMATED COUNT: 16.3 % (ref 11.5–14.5)
FERRITIN SERPL-MCNC: 142 NG/ML (ref 20–300)
FOLATE SERPL-MCNC: 6.2 NG/ML
HCT VFR BLD AUTO: 26.3 % (ref 41–52)
HGB BLD-MCNC: 8.5 G/DL (ref 13.5–17.5)
HGB RETIC QN: 30 PG (ref 28–38)
IMM GRANULOCYTES # BLD AUTO: 0 X10*3/UL (ref 0–0.5)
IMM GRANULOCYTES NFR BLD AUTO: 0 % (ref 0–0.9)
IMMATURE RETIC FRACTION: 19.7 %
IRON SATN MFR SERPL: 26 % (ref 25–45)
IRON SERPL-MCNC: 62 UG/DL (ref 35–150)
LDH SERPL L TO P-CCNC: 322 U/L (ref 84–246)
LYMPHOCYTES # BLD AUTO: 0.86 X10*3/UL (ref 0.8–3)
LYMPHOCYTES NFR BLD AUTO: 41.3 %
MCH RBC QN AUTO: 28.8 PG (ref 26–34)
MCHC RBC AUTO-ENTMCNC: 32.3 G/DL (ref 32–36)
MCV RBC AUTO: 89 FL (ref 80–100)
MONOCYTES # BLD AUTO: 0.16 X10*3/UL (ref 0.05–0.8)
MONOCYTES NFR BLD AUTO: 7.7 %
NEUTROPHILS # BLD AUTO: 1.06 X10*3/UL (ref 1.6–5.5)
NEUTROPHILS NFR BLD AUTO: 51 %
NRBC BLD-RTO: 0 /100 WBCS (ref 0–0)
PLATELET # BLD AUTO: 135 X10*3/UL (ref 150–450)
RBC # BLD AUTO: 2.95 X10*6/UL (ref 4.5–5.9)
RETICS #: 0.1 X10*6/UL (ref 0.02–0.11)
RETICS/RBC NFR AUTO: 3.6 % (ref 0.5–2)
TIBC SERPL-MCNC: 238 UG/DL (ref 240–445)
UIBC SERPL-MCNC: 176 UG/DL (ref 110–370)
VIT B12 SERPL-MCNC: 314 PG/ML (ref 211–911)
WBC # BLD AUTO: 2.1 X10*3/UL (ref 4.4–11.3)

## 2024-03-28 PROCEDURE — 1160F RVW MEDS BY RX/DR IN RCRD: CPT | Performed by: INTERNAL MEDICINE

## 2024-03-28 PROCEDURE — 85025 COMPLETE CBC W/AUTO DIFF WBC: CPT

## 2024-03-28 PROCEDURE — 3078F DIAST BP <80 MM HG: CPT | Performed by: INTERNAL MEDICINE

## 2024-03-28 PROCEDURE — 85045 AUTOMATED RETICULOCYTE COUNT: CPT

## 2024-03-28 PROCEDURE — 99215 OFFICE O/P EST HI 40 MIN: CPT | Performed by: INTERNAL MEDICINE

## 2024-03-28 PROCEDURE — 82746 ASSAY OF FOLIC ACID SERUM: CPT

## 2024-03-28 PROCEDURE — 83615 LACTATE (LD) (LDH) ENZYME: CPT

## 2024-03-28 PROCEDURE — 3077F SYST BP >= 140 MM HG: CPT | Performed by: INTERNAL MEDICINE

## 2024-03-28 PROCEDURE — 82728 ASSAY OF FERRITIN: CPT

## 2024-03-28 PROCEDURE — 83921 ORGANIC ACID SINGLE QUANT: CPT

## 2024-03-28 PROCEDURE — 83540 ASSAY OF IRON: CPT

## 2024-03-28 PROCEDURE — 82607 VITAMIN B-12: CPT

## 2024-03-28 PROCEDURE — 1159F MED LIST DOCD IN RCRD: CPT | Performed by: INTERNAL MEDICINE

## 2024-03-28 PROCEDURE — 36415 COLL VENOUS BLD VENIPUNCTURE: CPT

## 2024-03-28 PROCEDURE — 1126F AMNT PAIN NOTED NONE PRSNT: CPT | Performed by: INTERNAL MEDICINE

## 2024-03-28 ASSESSMENT — ENCOUNTER SYMPTOMS
ALLERGIC/IMMUNOLOGIC NEGATIVE: 1
CARDIOVASCULAR NEGATIVE: 1
HEMATOLOGIC/LYMPHATIC NEGATIVE: 1
ENDOCRINE NEGATIVE: 1
GASTROINTESTINAL NEGATIVE: 1
RESPIRATORY NEGATIVE: 1
CONSTITUTIONAL NEGATIVE: 1
PSYCHIATRIC NEGATIVE: 1
WEAKNESS: 0
EYES NEGATIVE: 1
MUSCULOSKELETAL NEGATIVE: 1

## 2024-03-28 ASSESSMENT — PAIN SCALES - GENERAL: PAINLEVEL: 0-NO PAIN

## 2024-03-28 NOTE — PROGRESS NOTES
ELI spoke with patient and friend Snow Abernathy ([phone]) today in clinic in response to consult from Nurse Partner regarding putting in place additional supports in the home. Snow states that she provides ADL care to patient, but feels that at this time she is in need of additional support.  ELI spoke with patient re: PASSPORT services, as this patient is Medicaid eligible. Patient and Snow were agreeable to this referral being placed. Referral placed via online application through [facility] on this day.  Patient denied desire to move from his current home location, but rather requested to have additional in home supports initiated.  ELI provided contact information to Snow and encouraged her to reach out with any additional psychosocial needs. Following.

## 2024-03-28 NOTE — PROGRESS NOTES
Patient ID: Varinder Lizama is a 74 y.o. male.  Referring Physician: No referring provider defined for this encounter.  Primary Care Provider: Zeynep Zavala LPN  Date of Service:  3/28/2024       Oncology History Overview Note   Smoldering multiple myeloma, ISS stage II:  Characterized by IgG lambda-restricted paraprotein, initially 2.9 g/dL without evidence of end-organ damage. No lytic bone lesions. No anemia. No hypercalcemia. No renal dysfunction. 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 His kappa lambda light chains are low risk. He also has microcytosis likely secondary to alpha-thalassemia  versus iron deficiency.     Symptomatic IgG Lambda Multiple Myeloma ISS stage II   Characterized by anemia noted in 8/2016    Lenalidomide (25 mg daily 21 days on 7 days off) plus Dexamethasone (40 mg once weekly) started on 9/15/2016, stopped briefly due to rash restarted on 12/17/2016    Started maintenance Lenalidomide 10 mg  5/2017 every other day decreasing to 2.5 mg daily 12/2017.    PET/CT (11/7/23):   There is no hypermetabolic activity to suggest active focal  neoplastic process. No increased FDG uptake in the right scapula to  suggest disease involvement.    Continued pancytopenia noted through 2023. Revlimid dose reduced to 2.5mg 3 weeks on, 1 week off in 1/2024.     Held Revlimid as of 2/2024 due to continued cytopenias.        Multiple myeloma (CMS/HCC)   10/20/2023 Initial Diagnosis    Multiple myeloma (CMS/HCC)        SUBJECTIVE:  History of Present Illness:  Varinder presents to clinic 3/28/24 for a follow up visit with his friend Snow.   No acute complaints at this time. Snow is concerned about his worsening memory loss and needing more help with daily ADL.  Revlimid was held since 2/15/24 due to cytopenia.      Review of Systems   Constitutional: Negative.    HENT: Negative.      Eyes: Negative.    Respiratory: Negative.     Cardiovascular: Negative.    Gastrointestinal: Negative.    Endocrine: Negative.    Genitourinary: Negative.    Musculoskeletal: Negative.    Skin: Negative.    Allergic/Immunologic: Negative.    Neurological:  Negative for weakness.   Hematological: Negative.    Psychiatric/Behavioral: Negative.         OBJECTIVE:  KPS: Karnofsky Score: 80 - Normal activity with effort; some signs or symptoms of disease   VS:  /75 (BP Location: Left arm, Patient Position: Sitting)   Pulse 76   Temp 36.5 °C (97.7 °F)   Resp 16   Wt 64.7 kg (142 lb 10.2 oz) Comment: Shoes on  SpO2 91%   BMI 21.22 kg/m²   BSA: 1.77 meters squared    Physical Exam  Constitutional:       Appearance: Normal appearance. He is normal weight.   HENT:      Head: Normocephalic.      Nose: Nose normal.      Mouth/Throat:      Mouth: Mucous membranes are moist.      Pharynx: Oropharynx is clear.   Eyes:      Extraocular Movements: Extraocular movements intact.      Conjunctiva/sclera: Conjunctivae normal.      Pupils: Pupils are equal, round, and reactive to light.   Cardiovascular:      Rate and Rhythm: Normal rate and regular rhythm.      Pulses: Normal pulses.      Heart sounds: Normal heart sounds.   Pulmonary:      Effort: Pulmonary effort is normal.      Breath sounds: Normal breath sounds.   Abdominal:      General: Abdomen is flat. Bowel sounds are normal.      Palpations: Abdomen is soft.   Musculoskeletal:         General: Normal range of motion.      Cervical back: Normal range of motion and neck supple.   Skin:     General: Skin is warm and dry.      Comments: Dry skin, especially on feet   Neurological:      General: No focal deficit present.      Mental Status: He is alert and oriented to person, place, and time. Mental status is at baseline.      Motor: No weakness.      Gait: Gait abnormal.   Psychiatric:         Mood and Affect: Mood normal.         Behavior: Behavior normal.          Thought Content: Thought content normal.         Judgment: Judgment normal.       Bone marrow biopsy 2/22/24:      -- NORMOCELLULAR BONE MARROW WITH  MATURING TRILINEAGE HEMATOPOESIS AND GRANULOCYTIC HYPOPLASIA, SEE NOTE  -- NO DEFINITE MORPHOLOGIC OR IMMUNOPHENOTYPIC EVIDENCE OF PLASMA MAYA NEOPLASM     NOTE: Granulopoiesis appears decreased but the granulocytes present show complete maturation. The finding is of unclear etiology. Clinical correlation and correlation with pending genetic/molecular studies suggested.  Decreased iron storage    ASSESSMENT and PLAN:    Multiple Myeloma, IgG lambda, initially diagnosed in 7/2012  - Smoldering MM-> progressed in 8/2016 R-ISS stage II  - started on Rd on 9/15/16-> dose reduction>>> 2.5 mg currently  - Last bone survey in 5/2021: Small lesion of the right infra glenoid scapula unchanged from multiple prior studies. No new osseous lesions identified.  - Recent SPEP/IF: no measurable paraprotein  - Last UPEP/IF in 5/2016-> 182.4 mg/L IgG lambda  - Continue Revlimid 2.5mg   - Zometa last 11/18/21, no plans to resume at this time  - cont to monitor Myeloma panel every 2 months.  - Negative PET/CT 11/7/23  - Revlimid decreased to 3 weeks on 1 week off 1/2024  - HOLD Revlimid as of 2/15/24  - recent BMBx- no morphologic evidence of plasma cell neoplasm.  - continue monitor      Pancytopenia  - persistent after Revlimid was held  - BMBx from 2/22/24 as above -genetic/molecular study unremarkable.  - recheck B12, MMA, folate, Fe studies  - check haptoglobin next visit     Vitamin B12:  - Last injection 11/18/21     CKD  since 7/2017  - stable     Hypokalemia:  - Taking 20meq K TID  - K 3.1, pt. Stated he was only taking 20meq BID, encouraged TID dosing  - discontinued HCTZ  - advised to see PCP for HTN meds    Podiatry:  - Dry skin on feet, nails need care  - Referral to podiatry     RTC: 4 weeks    OkNoy Cutler MD

## 2024-03-31 ENCOUNTER — ONCOLOGY MEDICATION OUTREACH (OUTPATIENT)
Dept: HEMATOLOGY/ONCOLOGY | Facility: HOSPITAL | Age: 74
End: 2024-03-31
Payer: MEDICARE

## 2024-04-01 ENCOUNTER — SOCIAL WORK (OUTPATIENT)
Dept: HEMATOLOGY/ONCOLOGY | Facility: HOSPITAL | Age: 74
End: 2024-04-01
Payer: MEDICARE

## 2024-04-01 DIAGNOSIS — C90.00 MULTIPLE MYELOMA NOT HAVING ACHIEVED REMISSION (MULTI): Primary | ICD-10-CM

## 2024-04-01 LAB — METHYLMALONATE SERPL-SCNC: 0.18 UMOL/L (ref 0–0.4)

## 2024-04-01 NOTE — PROGRESS NOTES
SW spoke with patient's friend Snow to inform of bone marrow biopsy appointment date/time. Patient voiced understanding.  Snow also asks about getting a [employer] referral at this time, voicing food insecurities. SW offers to get this order placed so that patient can access food. SW relayed this request to provider to place order.    No other psychosocial needs identified during this visit.

## 2024-04-01 NOTE — PROGRESS NOTES
ONCOLOGY CLINICAL PHARMACY NOTE     Subjective  Varinder Lizama is a 74 y.o. male with MM, here for supportive care / symptom management.        Treatment history  [No matching plan found]     Objective  There were no vitals taken for this visit.  Lab Results   Component Value Date    WBC 2.1 (L) 03/28/2024    HGB 8.5 (L) 03/28/2024    HCT 26.3 (L) 03/28/2024    MCV 89 03/28/2024     (L) 03/28/2024      Lab Results   Component Value Date    GLUCOSE 101 (H) 02/15/2024    CALCIUM 9.1 02/15/2024     02/15/2024    K 3.5 02/15/2024    CO2 33 (H) 02/15/2024     02/15/2024    BUN 17 02/15/2024    CREATININE 1.28 02/15/2024     Lab Results   Component Value Date    ALT 8 (L) 02/15/2024    AST 12 02/15/2024    ALKPHOS 61 02/15/2024    BILITOT 0.9 02/15/2024       Allergies and Medications   Allergies   Allergen Reactions    Lenalidomide Unknown    Lisinopril Angioedema       Current Outpatient Medications:     acyclovir (Zovirax) 400 mg tablet, Take 1 tablet (400 mg) by mouth 2 times a day., Disp: 180 tablet, Rfl: 2    amLODIPine (Norvasc) 10 mg tablet, Take 1 tablet (10 mg) by mouth once daily., Disp: , Rfl:     ammonium lactate (Amlactin) 12 % cream, Apply topically if needed for dry skin., Disp: 385 g, Rfl: 3    aspirin 81 mg EC tablet, Take 1 tablet (81 mg) by mouth once daily., Disp: 90 tablet, Rfl: 2    benztropine (Cogentin) 1 mg tablet, Take by mouth., Disp: , Rfl:     Calcium 600 + D,3, 600 mg-10 mcg (400 unit) tablet, Take 1 tablet by mouth 2 times a day., Disp: 180 tablet, Rfl: 2    dapsone 100 mg tablet, Take 1 tablet (100 mg) by mouth once daily., Disp: , Rfl:     EPINEPHrine (EpiPen 2-Brett) 0.3 mg/0.3 mL injection syringe, Use as directed PRN, Disp: , Rfl:     famotidine (Pepcid) 40 mg tablet, Take 1 tablet (40 mg) by mouth once daily at bedtime., Disp: 90 tablet, Rfl: 1    lenalidomide (Revlimid) 2.5 mg capsule, Take one capsule by mouth once daily for 21 days on, 7 days off (28-day cycle)  (Patient not taking: Reported on 3/28/2024), Disp: 21 capsule, Rfl: 0    OLANZapine (ZyPREXA) 20 mg tablet, Take 1 tablet (20 mg) by mouth once daily at bedtime., Disp: , Rfl:     potassium chloride CR 10 mEq ER tablet, Take 2 tablets (20 mEq) by mouth 3 times a day., Disp: 540 tablet, Rfl: 3    trihexyphenidyl (Artane) 2 mg tablet, Take 0.5 tablets (1 mg) by mouth once daily at bedtime., Disp: , Rfl:     Assessment and Plan  Varinder Lizama is a 74 y.o. male with MM, to be treated with lenalidomide.    Per Dr. Cutler, on 3/28/2024, hold Revlimid indefinitely d/t continued and persistent pancytopenia. Previously held Revlimid a/o 2/15/24, with hopes that pancytopenia would resolve, but no improvement since then, despite holding medication.        Enoch Reece, PharmD

## 2024-04-02 DIAGNOSIS — C90.00 MULTIPLE MYELOMA, REMISSION STATUS UNSPECIFIED (MULTI): Primary | ICD-10-CM

## 2024-04-09 LAB
BAND RESOLUTION: 400 BANDS
CHROM ANALY OVERALL INTERP-IMP: NORMAL
CHROMOSOME ANALYSIS CELLS ANALYZED: 20 CELLS
CHROMOSOME ANALYSIS CELLS IMAGED: 4 CELLS
CHROMOSOME ANALYSIS HYPERMODAL CELL COUNT: 0 CELLS
CHROMOSOME ANALYSIS HYPOMODAL CELL COUNT: 44 CELLS
CHROMOSOME ANALYSIS MODAL CHROMOSOME NO: 46 CHROMOSOMES
CHROMOSOME ANALYSIS STAINING METHOD: NORMAL
ELECTRONICALLY SIGNED BY CYTOGENETICS: NORMAL
KARYOTYP MAR: 2 CELLS
TOTAL CELLS COUNTED MAR: 46 CELLS

## 2024-04-17 ENCOUNTER — APPOINTMENT (OUTPATIENT)
Dept: OTHER | Facility: HOSPITAL | Age: 74
End: 2024-04-17
Payer: MEDICARE

## 2024-04-25 ENCOUNTER — LAB (OUTPATIENT)
Dept: LAB | Facility: HOSPITAL | Age: 74
End: 2024-04-25
Payer: MEDICARE

## 2024-04-25 ENCOUNTER — OFFICE VISIT (OUTPATIENT)
Dept: HEMATOLOGY/ONCOLOGY | Facility: HOSPITAL | Age: 74
End: 2024-04-25
Payer: MEDICARE

## 2024-04-25 VITALS
OXYGEN SATURATION: 97 % | SYSTOLIC BLOOD PRESSURE: 161 MMHG | RESPIRATION RATE: 16 BRPM | DIASTOLIC BLOOD PRESSURE: 82 MMHG | TEMPERATURE: 98.1 F | WEIGHT: 150.35 LBS | HEART RATE: 75 BPM | BODY MASS INDEX: 22.37 KG/M2

## 2024-04-25 DIAGNOSIS — D61.818 PANCYTOPENIA (MULTI): ICD-10-CM

## 2024-04-25 DIAGNOSIS — C90.00 MULTIPLE MYELOMA, REMISSION STATUS UNSPECIFIED (MULTI): Primary | ICD-10-CM

## 2024-04-25 DIAGNOSIS — C90.00 MULTIPLE MYELOMA, REMISSION STATUS UNSPECIFIED (MULTI): ICD-10-CM

## 2024-04-25 DIAGNOSIS — Z51.11 ENCOUNTER FOR CHEMOTHERAPY MANAGEMENT: ICD-10-CM

## 2024-04-25 LAB
ALBUMIN SERPL BCP-MCNC: 4.2 G/DL (ref 3.4–5)
ALP SERPL-CCNC: 58 U/L (ref 33–136)
ALT SERPL W P-5'-P-CCNC: 6 U/L (ref 10–52)
ANION GAP SERPL CALC-SCNC: 11 MMOL/L (ref 10–20)
AST SERPL W P-5'-P-CCNC: 10 U/L (ref 9–39)
BASOPHILS # BLD AUTO: 0.01 X10*3/UL (ref 0–0.1)
BASOPHILS NFR BLD AUTO: 0.3 %
BILIRUB SERPL-MCNC: 0.7 MG/DL (ref 0–1.2)
BUN SERPL-MCNC: 14 MG/DL (ref 6–23)
CALCIUM SERPL-MCNC: 9.3 MG/DL (ref 8.6–10.3)
CHLORIDE SERPL-SCNC: 104 MMOL/L (ref 98–107)
CO2 SERPL-SCNC: 31 MMOL/L (ref 21–32)
CREAT SERPL-MCNC: 1.04 MG/DL (ref 0.5–1.3)
EGFRCR SERPLBLD CKD-EPI 2021: 76 ML/MIN/1.73M*2
EOSINOPHIL # BLD AUTO: 0.01 X10*3/UL (ref 0–0.4)
EOSINOPHIL NFR BLD AUTO: 0.3 %
ERYTHROCYTE [DISTWIDTH] IN BLOOD BY AUTOMATED COUNT: 15.4 % (ref 11.5–14.5)
GLUCOSE SERPL-MCNC: 107 MG/DL (ref 74–99)
HCT VFR BLD AUTO: 30 % (ref 41–52)
HGB BLD-MCNC: 9.7 G/DL (ref 13.5–17.5)
IGA SERPL-MCNC: 116 MG/DL (ref 70–400)
IGG SERPL-MCNC: 1140 MG/DL (ref 700–1600)
IGM SERPL-MCNC: 61 MG/DL (ref 40–230)
IMM GRANULOCYTES # BLD AUTO: 0.01 X10*3/UL (ref 0–0.5)
IMM GRANULOCYTES NFR BLD AUTO: 0.3 % (ref 0–0.9)
KAPPA LC SERPL-MCNC: 1.92 MG/DL (ref 0.33–1.94)
KAPPA LC/LAMBDA SER: 1.27 {RATIO} (ref 0.26–1.65)
LAMBDA LC SERPL-MCNC: 1.51 MG/DL (ref 0.57–2.63)
LYMPHOCYTES # BLD AUTO: 1.28 X10*3/UL (ref 0.8–3)
LYMPHOCYTES NFR BLD AUTO: 35.8 %
MCH RBC QN AUTO: 29.8 PG (ref 26–34)
MCHC RBC AUTO-ENTMCNC: 32.3 G/DL (ref 32–36)
MCV RBC AUTO: 92 FL (ref 80–100)
MONOCYTES # BLD AUTO: 0.2 X10*3/UL (ref 0.05–0.8)
MONOCYTES NFR BLD AUTO: 5.6 %
NEUTROPHILS # BLD AUTO: 2.07 X10*3/UL (ref 1.6–5.5)
NEUTROPHILS NFR BLD AUTO: 57.7 %
NRBC BLD-RTO: 0 /100 WBCS (ref 0–0)
PLATELET # BLD AUTO: 157 X10*3/UL (ref 150–450)
POTASSIUM SERPL-SCNC: 3.6 MMOL/L (ref 3.5–5.3)
PROT SERPL-MCNC: 6.5 G/DL (ref 6.4–8.2)
PROT SERPL-MCNC: 6.7 G/DL (ref 6.4–8.2)
RBC # BLD AUTO: 3.26 X10*6/UL (ref 4.5–5.9)
SODIUM SERPL-SCNC: 142 MMOL/L (ref 136–145)
WBC # BLD AUTO: 3.6 X10*3/UL (ref 4.4–11.3)

## 2024-04-25 PROCEDURE — 86334 IMMUNOFIX E-PHORESIS SERUM: CPT

## 2024-04-25 PROCEDURE — 85025 COMPLETE CBC W/AUTO DIFF WBC: CPT

## 2024-04-25 PROCEDURE — 1036F TOBACCO NON-USER: CPT

## 2024-04-25 PROCEDURE — 99215 OFFICE O/P EST HI 40 MIN: CPT

## 2024-04-25 PROCEDURE — 80053 COMPREHEN METABOLIC PANEL: CPT

## 2024-04-25 PROCEDURE — 83521 IG LIGHT CHAINS FREE EACH: CPT

## 2024-04-25 PROCEDURE — 84165 PROTEIN E-PHORESIS SERUM: CPT | Performed by: STUDENT IN AN ORGANIZED HEALTH CARE EDUCATION/TRAINING PROGRAM

## 2024-04-25 PROCEDURE — 1126F AMNT PAIN NOTED NONE PRSNT: CPT

## 2024-04-25 PROCEDURE — 1160F RVW MEDS BY RX/DR IN RCRD: CPT

## 2024-04-25 PROCEDURE — 86320 SERUM IMMUNOELECTROPHORESIS: CPT | Performed by: STUDENT IN AN ORGANIZED HEALTH CARE EDUCATION/TRAINING PROGRAM

## 2024-04-25 PROCEDURE — 36415 COLL VENOUS BLD VENIPUNCTURE: CPT

## 2024-04-25 PROCEDURE — 1159F MED LIST DOCD IN RCRD: CPT

## 2024-04-25 PROCEDURE — 84155 ASSAY OF PROTEIN SERUM: CPT | Mod: 59

## 2024-04-25 PROCEDURE — 82784 ASSAY IGA/IGD/IGG/IGM EACH: CPT

## 2024-04-25 PROCEDURE — 3079F DIAST BP 80-89 MM HG: CPT

## 2024-04-25 PROCEDURE — 3077F SYST BP >= 140 MM HG: CPT

## 2024-04-25 ASSESSMENT — ENCOUNTER SYMPTOMS
GASTROINTESTINAL NEGATIVE: 1
MUSCULOSKELETAL NEGATIVE: 1
ALLERGIC/IMMUNOLOGIC NEGATIVE: 1
HEMATOLOGIC/LYMPHATIC NEGATIVE: 1
EYES NEGATIVE: 1
WEAKNESS: 0
CONSTITUTIONAL NEGATIVE: 1
RESPIRATORY NEGATIVE: 1
PSYCHIATRIC NEGATIVE: 1
CARDIOVASCULAR NEGATIVE: 1
ENDOCRINE NEGATIVE: 1

## 2024-04-25 ASSESSMENT — PAIN SCALES - GENERAL: PAINLEVEL: 0-NO PAIN

## 2024-04-25 NOTE — PROGRESS NOTES
Patient ID: Varinder Lizama is a 74 y.o. male.  Referring Physician: Josephine Cutler MD  [id number] [id number]  Primary Care Provider: Zeynep Zavala LPN  Date of Service:  4/25/2024       Oncology History Overview Note   Smoldering multiple myeloma, ISS stage II:  Characterized by IgG lambda-restricted paraprotein, initially 2.9 g/dL without evidence of end-organ damage. No lytic bone lesions. No anemia. No hypercalcemia. No renal dysfunction. 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 His kappa lambda light chains are low risk. He also has microcytosis likely secondary to alpha-thalassemia  versus iron deficiency.     Symptomatic IgG Lambda Multiple Myeloma ISS stage II   Characterized by anemia noted in 8/2016    Lenalidomide (25 mg daily 21 days on 7 days off) plus Dexamethasone (40 mg once weekly) started on 9/15/2016, stopped briefly due to rash restarted on 12/17/2016    Started maintenance Lenalidomide 10 mg  5/2017 every other day decreasing to 2.5 mg daily 12/2017.    PET/CT (11/7/23):   There is no hypermetabolic activity to suggest active focal  neoplastic process. No increased FDG uptake in the right scapula to  suggest disease involvement.    Continued pancytopenia noted through 2023. Revlimid dose reduced to 2.5mg 3 weeks on, 1 week off in 1/2024.     Held Revlimid as of 2/2024 due to continued cytopenias.        Multiple myeloma (Multi)   10/20/2023 Initial Diagnosis    Multiple myeloma (CMS/HCC)        SUBJECTIVE:  History of Present Illness:  Varinder presents to clinic 3/28/24 for a follow up visit.  No acute complaints at this time. Snow is concerned about his worsening memory loss and needing more help with daily ADL.  Revlimid was held since 2/15/24 due to cytopenia.    Review of Systems   Constitutional: Negative.    HENT: Negative.     Eyes: Negative.     Respiratory: Negative.     Cardiovascular: Negative.    Gastrointestinal: Negative.    Endocrine: Negative.    Genitourinary: Negative.    Musculoskeletal: Negative.    Skin: Negative.    Allergic/Immunologic: Negative.    Neurological:  Negative for weakness.   Hematological: Negative.    Psychiatric/Behavioral: Negative.         OBJECTIVE:  KPS: Karnofsky Score: 80 - Normal activity with effort; some signs or symptoms of disease   VS:  There were no vitals taken for this visit.  BSA: There is no height or weight on file to calculate BSA.    Physical Exam  Constitutional:       Appearance: Normal appearance. He is normal weight.   HENT:      Head: Normocephalic.      Nose: Nose normal.      Mouth/Throat:      Mouth: Mucous membranes are moist.      Pharynx: Oropharynx is clear.   Eyes:      Extraocular Movements: Extraocular movements intact.      Conjunctiva/sclera: Conjunctivae normal.      Pupils: Pupils are equal, round, and reactive to light.   Cardiovascular:      Rate and Rhythm: Normal rate and regular rhythm.      Pulses: Normal pulses.      Heart sounds: Normal heart sounds.   Pulmonary:      Effort: Pulmonary effort is normal.      Breath sounds: Normal breath sounds.   Abdominal:      General: Abdomen is flat. Bowel sounds are normal.      Palpations: Abdomen is soft.   Musculoskeletal:         General: Normal range of motion.      Cervical back: Normal range of motion and neck supple.   Skin:     General: Skin is warm and dry.      Comments: Dry skin, especially on feet   Neurological:      General: No focal deficit present.      Mental Status: He is alert and oriented to person, place, and time. Mental status is at baseline.      Motor: No weakness.      Gait: Gait abnormal.   Psychiatric:         Mood and Affect: Mood normal.         Behavior: Behavior normal.         Thought Content: Thought content normal.         Judgment: Judgment normal.       ASSESSMENT and PLAN:    Multiple Myeloma, IgG  lambda, initially diagnosed in 7/2012  - Smoldering MM-> progressed in 8/2016 R-ISS stage II  - started on Rd on 9/15/16-> dose reduction>>> 2.5 mg currently  - Last bone survey in 5/2021: Small lesion of the right infra glenoid scapula unchanged from multiple prior studies. No new osseous lesions identified.  - Recent SPEP/IF: no measurable paraprotein  - Last UPEP/IF in 5/2016-> 182.4 mg/L IgG lambda  - Continue Revlimid 2.5mg   - Zometa last 11/18/21, no plans to resume at this time  - cont to monitor Myeloma panel every 2 months.  - Negative PET/CT 11/7/23  - Revlimid decreased to 3 weeks on 1 week off 1/2024  - HOLD Revlimid as of 2/15/24  - recent BMBx- no morphologic evidence of plasma cell neoplasm.  - continue monitor      Pancytopenia  - persistent after Revlimid was held  - BMBx from 2/22/24 as above -genetic/molecular study unremarkable.  - recheck B12, MMA, folate, Fe studies  - check haptoglobin next visit     Vitamin B12:  - Last injection 11/18/21     CKD  since 7/2017  - stable     Hypokalemia:  - Taking 20meq K TID  - K 3.1, pt. Stated he was only taking 20meq BID, encouraged TID dosing  - discontinued HCTZ  - advised to see PCP for HTN meds    Podiatry:  - Dry skin on feet, nails need care  - Referral to podiatry     RTC: 2 mo    Amy Cruz, APRN-CNP    MASON Bazzi-CNP

## 2024-05-02 LAB
ALBUMIN: 4.1 G/DL (ref 3.4–5)
ALPHA 1 GLOBULIN: 0.3 G/DL (ref 0.2–0.6)
ALPHA 2 GLOBULIN: 0.4 G/DL (ref 0.4–1.1)
BETA GLOBULIN: 0.6 G/DL (ref 0.5–1.2)
GAMMA GLOBULIN: 1 G/DL (ref 0.5–1.4)
IMMUNOFIXATION COMMENT: NORMAL
PATH REVIEW - SERUM IMMUNOFIXATION: NORMAL
PATH REVIEW-SERUM PROTEIN ELECTROPHORESIS: NORMAL
PROTEIN ELECTROPHORESIS COMMENT: NORMAL

## 2024-05-08 ENCOUNTER — SOCIAL WORK (OUTPATIENT)
Dept: HEMATOLOGY/ONCOLOGY | Facility: HOSPITAL | Age: 74
End: 2024-05-08
Payer: MEDICARE

## 2024-05-08 ENCOUNTER — NURSE TRIAGE (OUTPATIENT)
Dept: ADMISSION | Facility: HOSPITAL | Age: 74
End: 2024-05-08
Payer: MEDICARE

## 2024-05-08 NOTE — TELEPHONE ENCOUNTER
I called and spoke with Snow; she states she wants to speak with the team about getting Varinder into a nursing home.    She did say he has been experiencing urinary and stool incontinence for about a month now.   He does make it to the bathroom some times, however, is going to the bathroom on himself most times.    He can tell he needs to use the restroom, however, cannot make it there in time. He does not have a bedside commode in the home.     She states he does not have any worsening leg weakness, however, did state that Varinder fell today at home; he caught himself and did not hit his head/injure himself.    She states he does not take his time while walking and falls about twice a week. This has been an ongoing issue with Varinder per Snow.     No new pain per Snow.     She states she discussed his decline at his office visit on 4/25 (memory worsening as well as needing more help with his ADLs).     She wants to speak with social work about getting him into a nursing home at this time.     Varinder does not wish to go to a home, however, Snow is having a difficult time taking care of him as he requires a lot of assistance with his ADLs (she has to dress him/cook for him/take him to the bathroom/help him shower) etc.

## 2024-05-08 NOTE — TELEPHONE ENCOUNTER
I called Snow and let her know that per team, she should reach out to Varinder's PCP regarding this as Varinder does not have active cancer/is not on treatment. Per team, these issues are chronic as well.     She did confirm he has a PCP and she will contact them for further management.   No further questions or concerns at this time.

## 2024-05-08 NOTE — PROGRESS NOTES
"SW received voicemail from Snow, patient's friend who expresses concerns stating \"call me back, the  was supposed to be coming out. I can't do this all myself\".  SW attempted to call Snow back to secure more information re: context for social work involvement (e.g., what agencies are involved at present); Snow was not available, per patient who answered the phone. SW stated she would call back to the number Snow left in voicemail ([phone]) in the near future. Patient agreeable.  On last social work consult, per note, electronic referral was made to [facility] to initiate services. SW to follow up with [facility] to determine if these services are now active.  SW following.  "

## 2024-05-08 NOTE — TELEPHONE ENCOUNTER
Snow left a voicemail on the triage line that Varinder is experiencing incontinence.    I called her back to further discuss, however, she did not answer.    I left her a voicemail to call the triage line back.     Call also made to patient; he did not answer so voicemail left on his machine as well to call the triage line back to further assess his symptoms.

## 2024-05-09 ENCOUNTER — APPOINTMENT (OUTPATIENT)
Dept: HEMATOLOGY/ONCOLOGY | Facility: HOSPITAL | Age: 74
End: 2024-05-09
Payer: MEDICARE

## 2024-06-27 ENCOUNTER — LAB (OUTPATIENT)
Dept: LAB | Facility: HOSPITAL | Age: 74
End: 2024-06-27
Payer: MEDICARE

## 2024-06-27 ENCOUNTER — OFFICE VISIT (OUTPATIENT)
Dept: HEMATOLOGY/ONCOLOGY | Facility: HOSPITAL | Age: 74
End: 2024-06-27
Payer: MEDICARE

## 2024-06-27 VITALS
RESPIRATION RATE: 16 BRPM | OXYGEN SATURATION: 94 % | SYSTOLIC BLOOD PRESSURE: 163 MMHG | HEART RATE: 77 BPM | BODY MASS INDEX: 22.14 KG/M2 | WEIGHT: 148.81 LBS | TEMPERATURE: 96.6 F | DIASTOLIC BLOOD PRESSURE: 74 MMHG

## 2024-06-27 DIAGNOSIS — E61.1 IRON DEFICIENCY: ICD-10-CM

## 2024-06-27 DIAGNOSIS — C90.00 MULTIPLE MYELOMA, REMISSION STATUS UNSPECIFIED (MULTI): ICD-10-CM

## 2024-06-27 DIAGNOSIS — D47.2 SMOLDERING MULTIPLE MYELOMA: ICD-10-CM

## 2024-06-27 DIAGNOSIS — C90.00 MULTIPLE MYELOMA NOT HAVING ACHIEVED REMISSION (MULTI): ICD-10-CM

## 2024-06-27 DIAGNOSIS — B37.0 THRUSH: Primary | ICD-10-CM

## 2024-06-27 LAB
ALBUMIN SERPL BCP-MCNC: 4.2 G/DL (ref 3.4–5)
ALP SERPL-CCNC: 60 U/L (ref 33–136)
ALT SERPL W P-5'-P-CCNC: 8 U/L (ref 10–52)
ANION GAP SERPL CALC-SCNC: 12 MMOL/L (ref 10–20)
AST SERPL W P-5'-P-CCNC: 11 U/L (ref 9–39)
BASOPHILS # BLD AUTO: 0.01 X10*3/UL (ref 0–0.1)
BASOPHILS NFR BLD AUTO: 0.2 %
BILIRUB SERPL-MCNC: 0.7 MG/DL (ref 0–1.2)
BUN SERPL-MCNC: 19 MG/DL (ref 6–23)
CALCIUM SERPL-MCNC: 9 MG/DL (ref 8.6–10.3)
CHLORIDE SERPL-SCNC: 105 MMOL/L (ref 98–107)
CO2 SERPL-SCNC: 30 MMOL/L (ref 21–32)
CREAT SERPL-MCNC: 1.03 MG/DL (ref 0.5–1.3)
EGFRCR SERPLBLD CKD-EPI 2021: 77 ML/MIN/1.73M*2
EOSINOPHIL # BLD AUTO: 0.01 X10*3/UL (ref 0–0.4)
EOSINOPHIL NFR BLD AUTO: 0.2 %
ERYTHROCYTE [DISTWIDTH] IN BLOOD BY AUTOMATED COUNT: 15.4 % (ref 11.5–14.5)
GLUCOSE SERPL-MCNC: 113 MG/DL (ref 74–99)
HCT VFR BLD AUTO: 34.1 % (ref 41–52)
HGB BLD-MCNC: 10.9 G/DL (ref 13.5–17.5)
IGA SERPL-MCNC: 129 MG/DL (ref 70–400)
IGG SERPL-MCNC: 1170 MG/DL (ref 700–1600)
IGM SERPL-MCNC: 67 MG/DL (ref 40–230)
IMM GRANULOCYTES # BLD AUTO: 0.02 X10*3/UL (ref 0–0.5)
IMM GRANULOCYTES NFR BLD AUTO: 0.5 % (ref 0–0.9)
KAPPA LC SERPL-MCNC: 1.53 MG/DL (ref 0.33–1.94)
KAPPA LC/LAMBDA SER: 1.01 {RATIO} (ref 0.26–1.65)
LAMBDA LC SERPL-MCNC: 1.52 MG/DL (ref 0.57–2.63)
LYMPHOCYTES # BLD AUTO: 1.17 X10*3/UL (ref 0.8–3)
LYMPHOCYTES NFR BLD AUTO: 26.8 %
MCH RBC QN AUTO: 28.1 PG (ref 26–34)
MCHC RBC AUTO-ENTMCNC: 32 G/DL (ref 32–36)
MCV RBC AUTO: 88 FL (ref 80–100)
MONOCYTES # BLD AUTO: 0.24 X10*3/UL (ref 0.05–0.8)
MONOCYTES NFR BLD AUTO: 5.5 %
NEUTROPHILS # BLD AUTO: 2.92 X10*3/UL (ref 1.6–5.5)
NEUTROPHILS NFR BLD AUTO: 66.8 %
NRBC BLD-RTO: 0 /100 WBCS (ref 0–0)
PLATELET # BLD AUTO: 152 X10*3/UL (ref 150–450)
POTASSIUM SERPL-SCNC: 3.7 MMOL/L (ref 3.5–5.3)
PROT SERPL-MCNC: 6.9 G/DL (ref 6.4–8.2)
PROT SERPL-MCNC: 6.9 G/DL (ref 6.4–8.2)
RBC # BLD AUTO: 3.88 X10*6/UL (ref 4.5–5.9)
SODIUM SERPL-SCNC: 143 MMOL/L (ref 136–145)
WBC # BLD AUTO: 4.4 X10*3/UL (ref 4.4–11.3)

## 2024-06-27 PROCEDURE — 83521 IG LIGHT CHAINS FREE EACH: CPT

## 2024-06-27 PROCEDURE — 99215 OFFICE O/P EST HI 40 MIN: CPT | Performed by: INTERNAL MEDICINE

## 2024-06-27 PROCEDURE — 82784 ASSAY IGA/IGD/IGG/IGM EACH: CPT

## 2024-06-27 PROCEDURE — 1036F TOBACCO NON-USER: CPT | Performed by: INTERNAL MEDICINE

## 2024-06-27 PROCEDURE — 80053 COMPREHEN METABOLIC PANEL: CPT

## 2024-06-27 PROCEDURE — 86334 IMMUNOFIX E-PHORESIS SERUM: CPT

## 2024-06-27 PROCEDURE — 3077F SYST BP >= 140 MM HG: CPT | Performed by: INTERNAL MEDICINE

## 2024-06-27 PROCEDURE — 85025 COMPLETE CBC W/AUTO DIFF WBC: CPT

## 2024-06-27 PROCEDURE — 36415 COLL VENOUS BLD VENIPUNCTURE: CPT

## 2024-06-27 PROCEDURE — 84155 ASSAY OF PROTEIN SERUM: CPT

## 2024-06-27 PROCEDURE — 1126F AMNT PAIN NOTED NONE PRSNT: CPT | Performed by: INTERNAL MEDICINE

## 2024-06-27 PROCEDURE — 1159F MED LIST DOCD IN RCRD: CPT | Performed by: INTERNAL MEDICINE

## 2024-06-27 PROCEDURE — 3078F DIAST BP <80 MM HG: CPT | Performed by: INTERNAL MEDICINE

## 2024-06-27 RX ORDER — NYSTATIN 100000 [USP'U]/ML
500000 SUSPENSION ORAL 4 TIMES DAILY
Qty: 280 ML | Refills: 0 | Status: SHIPPED | OUTPATIENT
Start: 2024-06-27 | End: 2024-07-11

## 2024-06-27 ASSESSMENT — ENCOUNTER SYMPTOMS
CARDIOVASCULAR NEGATIVE: 1
NEUROLOGICAL NEGATIVE: 1
ENDOCRINE NEGATIVE: 1
RESPIRATORY NEGATIVE: 1
CONSTITUTIONAL NEGATIVE: 1
WEAKNESS: 0
GASTROINTESTINAL NEGATIVE: 1
HEMATOLOGIC/LYMPHATIC NEGATIVE: 1
MUSCULOSKELETAL NEGATIVE: 1
ALLERGIC/IMMUNOLOGIC NEGATIVE: 1
EYES NEGATIVE: 1
PSYCHIATRIC NEGATIVE: 1

## 2024-06-27 ASSESSMENT — PAIN SCALES - GENERAL: PAINLEVEL: 0-NO PAIN

## 2024-06-27 NOTE — PROGRESS NOTES
Patient ID: Varinder Lizama is a 74 y.o. male.    Oncology History Overview Note   Smoldering multiple myeloma, ISS stage II:  Characterized by IgG lambda-restricted paraprotein, initially 2.9 g/dL without evidence of end-organ damage. No lytic bone lesions. No anemia. No hypercalcemia. No renal dysfunction. 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 His kappa lambda light chains are low risk. He also has microcytosis likely secondary to alpha-thalassemia  versus iron deficiency.     Symptomatic IgG Lambda Multiple Myeloma ISS stage II   Characterized by anemia noted in 8/2016    Lenalidomide (25 mg daily 21 days on 7 days off) plus Dexamethasone (40 mg once weekly) started on 9/15/2016, stopped briefly due to rash restarted on 12/17/2016    Started maintenance Lenalidomide 10 mg  5/2017 every other day decreasing to 2.5 mg daily 12/2017.    PET/CT (11/7/23):   There is no hypermetabolic activity to suggest active focal  neoplastic process. No increased FDG uptake in the right scapula to  suggest disease involvement.    Continued pancytopenia noted through 2023. Revlimid dose reduced to 2.5mg 3 weeks on, 1 week off in 1/2024.     Held Revlimid as of 2/2024 due to continued cytopenias.    -- NORMOCELLULAR BONE MARROW WITH  MATURING TRILINEAGE HEMATOPOESIS AND GRANULOCYTIC HYPOPLASIA, SEE NOTE  -- NO DEFINITE MORPHOLOGIC OR IMMUNOPHENOTYPIC EVIDENCE OF PLASMA MAYA NEOPLASM     NOTE: Granulopoiesis appears decreased but the granulocytes present show complete maturation. The finding is of unclear etiology. Clinical correlation and correlation with pending genetic/molecular studies suggested.  Decreased iron storage    - Continue to hold revlimid at this time        Multiple myeloma (Multi)   10/20/2023 Initial Diagnosis    Multiple myeloma (CMS/HCC)         SUBJECTIVE     2/15/24: Revlimid held  d/t cytopenia    3/28/24: No acute complaints at this time. Snow is concerned about his worsening memory loss and needing more help with daily ADL.    6/27/24: Varinder reports feeling well today. He states his energy is good and he is eating well. His friend and caretaker, Snow, feels that he needs more assistance at home with cooking/ bathing/ ADLs. Denies n/v/d/c, weight loss, fever, chill, dizziness, night sweats.       Review of Systems   Constitutional: Negative.    HENT:  Negative.     Eyes: Negative.    Respiratory: Negative.     Cardiovascular: Negative.    Gastrointestinal: Negative.    Endocrine: Negative.    Genitourinary: Negative.     Musculoskeletal: Negative.    Skin: Negative.    Neurological: Negative.    Hematological: Negative.    Psychiatric/Behavioral: Negative.           OBJECTIVE   BSA: 1.81 meters squared    /74   Pulse 77   Temp 35.9 °C (96.6 °F)   Resp 16   Wt 67.5 kg (148 lb 13 oz)   SpO2 94%   BMI 22.14 kg/m²   Vital signs reviewed in flowsheets.   Physical Exam  Constitutional:       Appearance: He is normal weight.   HENT:      Head: Normocephalic and atraumatic.      Mouth/Throat:      Mouth: Mucous membranes are dry.      Comments: White plaque on tongue  Eyes:      Extraocular Movements: Extraocular movements intact.      Conjunctiva/sclera: Conjunctivae normal.      Pupils: Pupils are equal, round, and reactive to light.   Cardiovascular:      Pulses: Normal pulses.      Heart sounds: Normal heart sounds.   Pulmonary:      Effort: Pulmonary effort is normal.      Breath sounds: Normal breath sounds.   Abdominal:      General: Abdomen is flat. Bowel sounds are normal.      Palpations: Abdomen is soft.   Musculoskeletal:         General: Normal range of motion.      Cervical back: Normal range of motion and neck supple.   Skin:     General: Skin is warm and dry.   Neurological:      Mental Status: He is alert. Mental status is at baseline.         Performance  Status:  Karnofsky Score: 60 - Requires occasional assistance, but is able care for most of personal needs    Past Medical History:   Diagnosis Date    Age-related nuclear cataract, bilateral 11/23/2016    Age-related nuclear cataract of both eyes    Angioneurotic edema, initial encounter 04/07/2015    ACE inhibitor-aggravated angioedema    Cortical age-related cataract, bilateral 11/23/2016    Cortical age-related cataract of both eyes    Food insecurity 05/19/2022    Food insecurity    Keratoconjunctivitis due to adenovirus 08/04/2016    EKC (epidemic keratoconjunctivitis)    Posterior subcapsular polar age-related cataract, right eye 10/10/2016    Posterior subcapsular polar age-related cataract of right eye    Viral conjunctivitis, unspecified 10/10/2016    Acute viral conjunctivitis of both eyes     Past Surgical History:   Procedure Laterality Date    OTHER SURGICAL HISTORY  07/28/2017    Prior Surgical Procedure Not Done       Family History   Problem Relation Name Age of Onset    Heart attack Mother      Heart attack Father             MONITORING     MARKERS RECENT LAB VALUES   Free Kappa Light Chains Lab Results   Component Value Date    KAPPA 1.53 06/27/2024    KAPPA 1.92 04/25/2024    KAPPA 3.28 (H) 02/15/2024      Free Lambda Light Chains Lab Results   Component Value Date    LAMBDA 1.52 06/27/2024    LAMBDA 1.51 04/25/2024    LAMBDA 2.23 02/15/2024      Free Light Chain Ratio Lab Results   Component Value Date    KAPLS 1.01 06/27/2024    KAPLS 1.27 04/25/2024    KAPLS 1.47 02/15/2024       Immunofixation Lab Results   Component Value Date    IEPIN No monoclonal protein detected by immunofixation. 04/25/2024    IEPIN No monoclonal protein detected by immunofixation. 02/15/2024    IEPIN No monoclonal protein detected by immunofixation. 01/18/2024       IgG Lab Results   Component Value Date    IGG 1,140 04/25/2024    IGG 1,170 02/15/2024    IGG 1,210 01/18/2024      IgA Lab Results   Component Value Date      04/25/2024     02/15/2024     01/18/2024      IgM Lab Results   Component Value Date    IGM 61 04/25/2024    IGM 70 02/15/2024    IGM 94 01/18/2024        Other Labs  Lab Results   Component Value Date    WBC 4.4 06/27/2024    NRBC 0.0 06/27/2024    RBC 3.88 (L) 06/27/2024    HGB 10.9 (L) 06/27/2024    HCT 34.1 (L) 06/27/2024    MCV 88 06/27/2024    MCH 28.1 06/27/2024    MCHC 32.0 06/27/2024    RDW 15.4 (H) 06/27/2024     06/27/2024    IGPCT 0.5 06/27/2024    LYMPHOPCT 26.8 06/27/2024    MONOPCT 5.5 06/27/2024    EOSPCT 0.2 06/27/2024    BASOPCT 0.2 06/27/2024    NEUTROABS 2.92 06/27/2024    MONOSABS 0.24 06/27/2024    EOSABS 0.01 06/27/2024       Lab Results   Component Value Date    GLUCOSE 113 (H) 06/27/2024     06/27/2024    K 3.7 06/27/2024     06/27/2024    CO2 30 06/27/2024    ANIONGAP 12 06/27/2024    BUN 19 06/27/2024    CREATININE 1.03 06/27/2024    EGFR 77 06/27/2024    CALCIUM 9.0 06/27/2024    ALBUMIN 4.2 06/27/2024    ALKPHOS 60 06/27/2024    PROT 6.9 06/27/2024    PROT 6.9 06/27/2024    AST 11 06/27/2024    BILITOT 0.7 06/27/2024    ALT 8 (L) 06/27/2024       Lab Results   Component Value Date     (H) 03/28/2024       ASSESSMENT & PLAN   Multiple Myeloma, IgG lambda, initially diagnosed in 7/2012  - Smoldering MM-> progressed in 8/2016 R-ISS stage II  - started on Rd on 9/15/16-> dose reduction>>> 2.5 mg currently  - Last bone survey in 5/2021: Small lesion of the right infra glenoid scapula unchanged from multiple prior studies. No new osseous lesions identified.  - Recent SPEP/IF: no measurable paraprotein  - Last UPEP/IF in 5/2016-> 182.4 mg/L IgG lambda  - Continue Revlimid 2.5mg   - Zometa last 11/18/21, no plans to resume at this time  - cont to monitor Myeloma panel every 2 months.  - Negative PET/CT 11/7/23  - Revlimid decreased to 3 weeks on 1 week off 1/2024  - HOLD Revlimid as of 2/15/24  - recent BMBx- no morphologic evidence of plasma  cell neoplasm.  - continue monitor      Pancytopenia  - persistent after Revlimid was held  - BMBx from 2/22/24 as above -genetic/molecular study unremarkable.    Oral Candidiasis  -14 days nystatin mouth rinse      Vitamin B12:  - Last injection 11/18/21     CKD  since 7/2017  - stable     Hypokalemia:  - Taking 20meq K TID  - K 3.1, pt. Stated he was only taking 20meq BID, encouraged TID dosing  - discontinued HCTZ  - advised to see PCP for HTN meds     Podiatry:  - Dry skin on feet, nails need care  - Referral to podiatry      RTC: 3 mo       MASON Noel-CNP

## 2024-06-27 NOTE — PROGRESS NOTES
Patient ID: Varinder Lizama is a 74 y.o. male.  Referring Physician: No referring provider defined for this encounter.  Primary Care Provider: Zeynep Zavala LPN  Date of Service:  6/27/2024       Oncology History Overview Note   Smoldering multiple myeloma, ISS stage II:  Characterized by IgG lambda-restricted paraprotein, initially 2.9 g/dL without evidence of end-organ damage. No lytic bone lesions. No anemia. No hypercalcemia. No renal dysfunction. 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 His kappa lambda light chains are low risk. He also has microcytosis likely secondary to alpha-thalassemia  versus iron deficiency.     Symptomatic IgG Lambda Multiple Myeloma ISS stage II   Characterized by anemia noted in 8/2016    Lenalidomide (25 mg daily 21 days on 7 days off) plus Dexamethasone (40 mg once weekly) started on 9/15/2016, stopped briefly due to rash restarted on 12/17/2016    Started maintenance Lenalidomide 10 mg  5/2017 every other day decreasing to 2.5 mg daily 12/2017.    PET/CT (11/7/23):   There is no hypermetabolic activity to suggest active focal  neoplastic process. No increased FDG uptake in the right scapula to  suggest disease involvement.    Continued pancytopenia noted through 2023. Revlimid dose reduced to 2.5mg 3 weeks on, 1 week off in 1/2024.     Held Revlimid as of 2/2024 due to continued cytopenias.    -- NORMOCELLULAR BONE MARROW WITH  MATURING TRILINEAGE HEMATOPOESIS AND GRANULOCYTIC HYPOPLASIA, SEE NOTE  -- NO DEFINITE MORPHOLOGIC OR IMMUNOPHENOTYPIC EVIDENCE OF PLASMA MAYA NEOPLASM     NOTE: Granulopoiesis appears decreased but the granulocytes present show complete maturation. The finding is of unclear etiology. Clinical correlation and correlation with pending genetic/molecular studies suggested.  Decreased iron storage    - Continue to hold revlimid at  this time        Multiple myeloma (Multi)   10/20/2023 Initial Diagnosis    Multiple myeloma (CMS/HCC)        SUBJECTIVE:  History of Present Illness:  Varinder presents to clinic 3/28/24 for a follow up visit with his friend Snow.   No acute complaints at this time. Snow is concerned about his worsening memory loss and needing more help with daily ADL.  Revlimid was held since 2/15/24 due to cytopenia.    Review of Systems   Constitutional: Negative.    HENT: Negative.     Eyes: Negative.    Respiratory: Negative.     Cardiovascular: Negative.    Gastrointestinal: Negative.    Endocrine: Negative.    Genitourinary: Negative.    Musculoskeletal: Negative.    Skin: Negative.    Allergic/Immunologic: Negative.    Neurological:  Negative for weakness.   Hematological: Negative.    Psychiatric/Behavioral: Negative.         OBJECTIVE:  KPS: Karnofsky Score: 80 - Normal activity with effort; some signs or symptoms of disease   VS:  /74   Pulse 77   Temp 35.9 °C (96.6 °F)   Resp 16   Wt 67.5 kg (148 lb 13 oz)   SpO2 94%   BMI 22.14 kg/m²   BSA: 1.81 meters squared    Physical Exam  Constitutional:       Appearance: Normal appearance. He is normal weight.   HENT:      Head: Normocephalic.      Nose: Nose normal.      Mouth/Throat:      Mouth: Mucous membranes are moist.      Pharynx: Oropharynx is clear.   Eyes:      Extraocular Movements: Extraocular movements intact.      Conjunctiva/sclera: Conjunctivae normal.      Pupils: Pupils are equal, round, and reactive to light.   Cardiovascular:      Rate and Rhythm: Normal rate and regular rhythm.      Pulses: Normal pulses.      Heart sounds: Normal heart sounds.   Pulmonary:      Effort: Pulmonary effort is normal.      Breath sounds: Normal breath sounds.   Abdominal:      General: Abdomen is flat. Bowel sounds are normal.      Palpations: Abdomen is soft.   Musculoskeletal:         General: Normal range of motion.      Cervical back: Normal range of motion and  neck supple.   Skin:     General: Skin is warm and dry.      Comments: Dry skin, especially on feet   Neurological:      General: No focal deficit present.      Mental Status: He is alert and oriented to person, place, and time. Mental status is at baseline.      Motor: No weakness.      Gait: Gait abnormal.   Psychiatric:         Mood and Affect: Mood normal.         Behavior: Behavior normal.         Thought Content: Thought content normal.         Judgment: Judgment normal.       Bone marrow biopsy 2/22/24:      -- NORMOCELLULAR BONE MARROW WITH  MATURING TRILINEAGE HEMATOPOESIS AND GRANULOCYTIC HYPOPLASIA, SEE NOTE  -- NO DEFINITE MORPHOLOGIC OR IMMUNOPHENOTYPIC EVIDENCE OF PLASMA MAYA NEOPLASM     NOTE: Granulopoiesis appears decreased but the granulocytes present show complete maturation. The finding is of unclear etiology. Clinical correlation and correlation with pending genetic/molecular studies suggested.  Decreased iron storage    ASSESSMENT and PLAN:    Multiple Myeloma, IgG lambda, initially diagnosed in 7/2012  - Smoldering MM-> progressed in 8/2016 R-ISS stage II  - started on Rd on 9/15/16-> dose reduction>>> 2.5 mg currently  - Last bone survey in 5/2021: Small lesion of the right infra glenoid scapula unchanged from multiple prior studies. No new osseous lesions identified.  - Recent SPEP/IF: no measurable paraprotein  - Last UPEP/IF in 5/2016-> 182.4 mg/L IgG lambda  - Continue Revlimid 2.5mg   - Zometa last 11/18/21, no plans to resume at this time  - cont to monitor Myeloma panel every 2 months.  - Negative PET/CT 11/7/23  - Revlimid decreased to 3 weeks on 1 week off 1/2024  - HOLD Revlimid as of 2/15/24  - recent BMBx- no morphologic evidence of plasma cell neoplasm.  - continue monitor      Pancytopenia  - persistent after Revlimid was held  - BMBx from 2/22/24 as above -genetic/molecular study unremarkable.  - recheck B12, MMA, folate, Fe studies  - check haptoglobin next visit     Vitamin  B12:  - Last injection 11/18/21     CKD  since 7/2017  - stable     Hypokalemia:  - Taking 20meq K TID  - K 3.1, pt. Stated he was only taking 20meq BID, encouraged TID dosing  - discontinued HCTZ  - advised to see PCP for HTN meds    Podiatry:  - Dry skin on feet, nails need care  - Referral to podiatry     RTC: 4 weeks    Josephine Cutler MD

## 2024-06-28 ENCOUNTER — SOCIAL WORK (OUTPATIENT)
Dept: HEMATOLOGY/ONCOLOGY | Facility: HOSPITAL | Age: 74
End: 2024-06-28
Payer: MEDICARE

## 2024-06-28 DIAGNOSIS — Z59.41 MILD FOOD INSECURITY: Primary | ICD-10-CM

## 2024-06-28 SDOH — ECONOMIC STABILITY: FOOD INSECURITY: WITHIN THE PAST 12 MONTHS, YOU WORRIED THAT YOUR FOOD WOULD RUN OUT BEFORE YOU GOT MONEY TO BUY MORE.: SOMETIMES TRUE

## 2024-06-28 SDOH — ECONOMIC STABILITY: FOOD INSECURITY: WITHIN THE PAST 12 MONTHS, THE FOOD YOU BOUGHT JUST DIDN'T LAST AND YOU DIDN'T HAVE MONEY TO GET MORE.: SOMETIMES TRUE

## 2024-06-28 SDOH — ECONOMIC STABILITY - FOOD INSECURITY: FOOD INSECURITY: Z59.41

## 2024-06-28 NOTE — PROGRESS NOTES
ELI was consulted by EFRAIN Cruz NP, regarding initiation of in home assistance with ADLs and food resources.  SW previously submitted online referral to [facility] for PASSPORT services, and upon contacting [facility] on this day, they report that per their records, they attempted outreach to patient/Snow 3 times, and no time did they receive a callback, or were able to leave a voicemail for return call. They also submitted a letter to patient's residence indicating their attempts to contact.  ELI was advised that if services are still needed, a new online referral should be submitted. SW will complete this repeat referral.  Per chart review, patient also was recommended by ELI for [employer]. SW confirming with NP if this order was placed to satisfy patient's reported food insecurity.  SW continues to follow.    Patient also spoke with patient's friend Snow on this day, and provided her with updated information re: the new referral that SW has submitted for PASSPORT services. Encouraged her to answer phone when PASSPORT representative reaches out to her to initiate services.  Also provided Snow with [employer] contact number so that she can contact dietician to coordinate time to come to market.  Snow voiced understanding and agrees to the above.

## 2024-07-01 LAB
ALBUMIN: 4.3 G/DL (ref 3.4–5)
ALPHA 1 GLOBULIN: 0.3 G/DL (ref 0.2–0.6)
ALPHA 2 GLOBULIN: 0.5 G/DL (ref 0.4–1.1)
BETA GLOBULIN: 0.7 G/DL (ref 0.5–1.2)
GAMMA GLOBULIN: 1.1 G/DL (ref 0.5–1.4)
IMMUNOFIXATION COMMENT: NORMAL
PATH REVIEW - SERUM IMMUNOFIXATION: NORMAL
PATH REVIEW-SERUM PROTEIN ELECTROPHORESIS: NORMAL
PROTEIN ELECTROPHORESIS COMMENT: NORMAL

## 2024-09-26 ENCOUNTER — TELEPHONE (OUTPATIENT)
Dept: HEMATOLOGY/ONCOLOGY | Facility: HOSPITAL | Age: 74
End: 2024-09-26
Payer: MEDICARE

## 2024-09-26 ENCOUNTER — APPOINTMENT (OUTPATIENT)
Dept: HEMATOLOGY/ONCOLOGY | Facility: HOSPITAL | Age: 74
End: 2024-09-26
Payer: MEDICARE

## 2024-09-26 NOTE — TELEPHONE ENCOUNTER
Attempted to call both Varinder and Snow's phone who are listed in the chart due to missed appointment today 9/26 with Aleksandra Cruz NP.   Unable to leave message due to Varinder's phone number being disconnected and Snow's  phone number said she was unable to take calls or messages.  If patient calls, we can reschedule this appointment to the next available with Dr. Cutler or Aleksandra Cruz NP.

## 2024-11-08 ENCOUNTER — APPOINTMENT (OUTPATIENT)
Dept: HEMATOLOGY/ONCOLOGY | Facility: HOSPITAL | Age: 74
End: 2024-11-08
Payer: MEDICARE

## 2024-11-13 ASSESSMENT — ENCOUNTER SYMPTOMS
CARDIOVASCULAR NEGATIVE: 1
ENDOCRINE NEGATIVE: 1
HEMATOLOGIC/LYMPHATIC NEGATIVE: 1
CONSTITUTIONAL NEGATIVE: 1
RESPIRATORY NEGATIVE: 1
PSYCHIATRIC NEGATIVE: 1
EYES NEGATIVE: 1

## 2024-11-13 NOTE — PROGRESS NOTES
Patient ID: Varinder Lizama is a 74 y.o. male.    Oncology History Overview Note   Smoldering multiple myeloma, ISS stage II:  Characterized by IgG lambda-restricted paraprotein, initially 2.9 g/dL without evidence of end-organ damage. No lytic bone lesions. No anemia. No hypercalcemia. No renal dysfunction. 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 His kappa lambda light chains are low risk. He also has microcytosis likely secondary to alpha-thalassemia  versus iron deficiency.     Symptomatic IgG Lambda Multiple Myeloma ISS stage II   Characterized by anemia noted in 8/2016    Lenalidomide (25 mg daily 21 days on 7 days off) plus Dexamethasone (40 mg once weekly) started on 9/15/2016, stopped briefly due to rash restarted on 12/17/2016    Started maintenance Lenalidomide 10 mg  5/2017 every other day decreasing to 2.5 mg daily 12/2017.    PET/CT (11/7/23):   There is no hypermetabolic activity to suggest active focal  neoplastic process. No increased FDG uptake in the right scapula to  suggest disease involvement.    Continued pancytopenia noted through 2023. Revlimid dose reduced to 2.5mg 3 weeks on, 1 week off in 1/2024.     Held Revlimid as of 2/2024 due to continued cytopenias.    -- NORMOCELLULAR BONE MARROW WITH  MATURING TRILINEAGE HEMATOPOESIS AND GRANULOCYTIC HYPOPLASIA, SEE NOTE  -- NO DEFINITE MORPHOLOGIC OR IMMUNOPHENOTYPIC EVIDENCE OF PLASMA MAYA NEOPLASM     NOTE: Granulopoiesis appears decreased but the granulocytes present show complete maturation. The finding is of unclear etiology. Clinical correlation and correlation with pending genetic/molecular studies suggested.  Decreased iron storage    - Continue to hold revlimid at this time        Multiple myeloma   10/20/2023 Initial Diagnosis    Multiple myeloma (CMS/HCC)         SUBJECTIVE     2/15/24: Revlimid held d/t  cytopenia    3/28/24: No acute complaints at this time. Snow is concerned about his worsening memory loss and needing more help with daily ADL.    6/27/24: Varinder reports feeling well today. He states his energy is good and he is eating well. His friend and caretaker, Snow, feels that he needs more assistance at home with cooking/ bathing/ ADLs. Denies n/v/d/c, weight loss, fever, chill, dizziness, night sweats.     11/14/24: Reports feeling well today. Notes he has difficulty moving around. Feels he is constipated occasionally but has BM approximately every other day. Denies fevers, pain, SOB, swelling, weight loss, night sweats, neuropathy, bruising      Review of Systems   Constitutional: Negative.    HENT:  Negative.     Eyes: Negative.    Respiratory: Negative.     Cardiovascular: Negative.    Gastrointestinal:  Positive for constipation.   Endocrine: Negative.    Genitourinary: Negative.     Skin: Negative.    Neurological:  Positive for extremity weakness.   Hematological: Negative.    Psychiatric/Behavioral: Negative.           OBJECTIVE   BSA: 1.76 meters squared    /76   Pulse 103   Temp 36.2 °C (97.2 °F)   Resp 17   Wt 64 kg (141 lb 1.5 oz)   SpO2 98%   BMI 20.99 kg/m²   Vital signs reviewed in flowsheets.   Physical Exam  Constitutional:       Appearance: He is normal weight.   HENT:      Head: Normocephalic and atraumatic.      Mouth/Throat:      Mouth: Mucous membranes are dry.      Comments: brown plaque on tongue  Eyes:      Extraocular Movements: Extraocular movements intact.      Conjunctiva/sclera: Conjunctivae normal.      Pupils: Pupils are equal, round, and reactive to light.   Cardiovascular:      Pulses: Normal pulses.      Heart sounds: Normal heart sounds.   Pulmonary:      Effort: Pulmonary effort is normal.      Breath sounds: Normal breath sounds.   Abdominal:      General: Abdomen is flat. Bowel sounds are normal.      Palpations: Abdomen is soft.   Musculoskeletal:          General: Normal range of motion.      Cervical back: Normal range of motion and neck supple.   Skin:     General: Skin is warm and dry.   Neurological:      Mental Status: He is alert. Mental status is at baseline.         Performance Status:  Karnofsky Score: 60 - Requires occasional assistance, but is able care for most of personal needs    Past Medical History:   Diagnosis Date    Age-related nuclear cataract, bilateral 11/23/2016    Age-related nuclear cataract of both eyes    Angioneurotic edema, initial encounter 04/07/2015    ACE inhibitor-aggravated angioedema    Cortical age-related cataract, bilateral 11/23/2016    Cortical age-related cataract of both eyes    Food insecurity 05/19/2022    Food insecurity    Keratoconjunctivitis due to adenovirus 08/04/2016    EKC (epidemic keratoconjunctivitis)    Posterior subcapsular polar age-related cataract, right eye 10/10/2016    Posterior subcapsular polar age-related cataract of right eye    Viral conjunctivitis, unspecified 10/10/2016    Acute viral conjunctivitis of both eyes     Past Surgical History:   Procedure Laterality Date    OTHER SURGICAL HISTORY  07/28/2017    Prior Surgical Procedure Not Done       Family History   Problem Relation Name Age of Onset    Heart attack Mother      Heart attack Father             MONITORING     MARKERS RECENT LAB VALUES   Free Kappa Light Chains Lab Results   Component Value Date    KAPPA 1.53 06/27/2024    KAPPA 1.92 04/25/2024    KAPPA 3.28 (H) 02/15/2024      Free Lambda Light Chains Lab Results   Component Value Date    LAMBDA 1.52 06/27/2024    LAMBDA 1.51 04/25/2024    LAMBDA 2.23 02/15/2024      Free Light Chain Ratio Lab Results   Component Value Date    KAPLS 1.01 06/27/2024    KAPLS 1.27 04/25/2024    KAPLS 1.47 02/15/2024       Immunofixation Lab Results   Component Value Date    IEPIN No monoclonal protein detected by immunofixation. 06/27/2024    IEPIN No monoclonal protein detected by immunofixation.  04/25/2024    IEPIN No monoclonal protein detected by immunofixation. 02/15/2024       IgG Lab Results   Component Value Date    IGG 1,170 06/27/2024    IGG 1,140 04/25/2024    IGG 1,170 02/15/2024      IgA Lab Results   Component Value Date     06/27/2024     04/25/2024     02/15/2024      IgM Lab Results   Component Value Date    IGM 67 06/27/2024    IGM 61 04/25/2024    IGM 70 02/15/2024        Other Labs  Lab Results   Component Value Date    WBC 4.4 06/27/2024    NRBC 0.0 06/27/2024    RBC 3.88 (L) 06/27/2024    HGB 10.9 (L) 06/27/2024    HCT 34.1 (L) 06/27/2024    MCV 88 06/27/2024    MCH 28.1 06/27/2024    MCHC 32.0 06/27/2024    RDW 15.4 (H) 06/27/2024     06/27/2024    IGPCT 0.5 06/27/2024    LYMPHOPCT 26.8 06/27/2024    MONOPCT 5.5 06/27/2024    EOSPCT 0.2 06/27/2024    BASOPCT 0.2 06/27/2024    NEUTROABS 2.92 06/27/2024    MONOSABS 0.24 06/27/2024    EOSABS 0.01 06/27/2024       Lab Results   Component Value Date    GLUCOSE 113 (H) 06/27/2024     06/27/2024    K 3.7 06/27/2024     06/27/2024    CO2 30 06/27/2024    ANIONGAP 12 06/27/2024    BUN 19 06/27/2024    CREATININE 1.03 06/27/2024    EGFR 77 06/27/2024    CALCIUM 9.0 06/27/2024    ALBUMIN 4.2 06/27/2024    ALKPHOS 60 06/27/2024    PROT 6.9 06/27/2024    PROT 6.9 06/27/2024    AST 11 06/27/2024    BILITOT 0.7 06/27/2024    ALT 8 (L) 06/27/2024       Lab Results   Component Value Date     (H) 03/28/2024       ASSESSMENT & PLAN   Multiple Myeloma, IgG lambda, initially diagnosed in 7/2012  - Smoldering MM-> progressed in 8/2016 R-ISS stage II  - started on Rd on 9/15/16-> dose reduction>>> 2.5 mg currently  - Last bone survey in 5/2021: Small lesion of the right infra glenoid scapula unchanged from multiple prior studies. No new osseous lesions identified.  - Recent SPEP/IF: no measurable paraprotein  - Last UPEP/IF in 5/2016-> 182.4 mg/L IgG lambda  - Continue Revlimid 2.5mg   - Zometa last 11/18/21, no  plans to resume at this time  - cont to monitor Myeloma panel every 2 months.  - Negative PET/CT 11/7/23  - Revlimid decreased to 3 weeks on 1 week off 1/2024  - HOLD Revlimid as of 2/15/24  - recent BMBx- no morphologic evidence of plasma cell neoplasm.  - continue monitor      Pancytopenia  - persistent after Revlimid was held  - BMBx from 2/22/24 as above -genetic/molecular study unremarkable.    Oral Candidiasis  -14 days nystatin mouth rinse      Vitamin B12:  - Last injection 11/18/21     CKD  since 7/2017  - stable     Hypokalemia:  - Taking 20meq K TID  - K 3.1, pt. Stated he was only taking 20meq BID, encouraged TID dosing  - discontinued HCTZ  - advised to see PCP for HTN meds. 1X refill today    Constipation  - colace sent      Podiatry:  - Dry skin on feet, nails need care  - Referral to podiatry      RTC: 1 mo       MASON Noel-CNP

## 2024-11-14 ENCOUNTER — OFFICE VISIT (OUTPATIENT)
Dept: HEMATOLOGY/ONCOLOGY | Facility: HOSPITAL | Age: 74
End: 2024-11-14
Payer: MEDICARE

## 2024-11-14 ENCOUNTER — LAB (OUTPATIENT)
Dept: LAB | Facility: HOSPITAL | Age: 74
End: 2024-11-14
Payer: MEDICARE

## 2024-11-14 ENCOUNTER — APPOINTMENT (OUTPATIENT)
Dept: HEMATOLOGY/ONCOLOGY | Facility: HOSPITAL | Age: 74
End: 2024-11-14
Payer: MEDICARE

## 2024-11-14 VITALS
SYSTOLIC BLOOD PRESSURE: 154 MMHG | TEMPERATURE: 97.2 F | RESPIRATION RATE: 17 BRPM | DIASTOLIC BLOOD PRESSURE: 76 MMHG | WEIGHT: 141.09 LBS | HEART RATE: 103 BPM | OXYGEN SATURATION: 98 % | BODY MASS INDEX: 20.99 KG/M2

## 2024-11-14 DIAGNOSIS — D47.2 SMOLDERING MULTIPLE MYELOMA: ICD-10-CM

## 2024-11-14 DIAGNOSIS — R27.0 ATAXIA: ICD-10-CM

## 2024-11-14 DIAGNOSIS — C90.00 MULTIPLE MYELOMA, REMISSION STATUS UNSPECIFIED (MULTI): ICD-10-CM

## 2024-11-14 DIAGNOSIS — I10 PRIMARY HYPERTENSION: Primary | ICD-10-CM

## 2024-11-14 DIAGNOSIS — K59.00 CONSTIPATION, UNSPECIFIED CONSTIPATION TYPE: ICD-10-CM

## 2024-11-14 LAB
ALBUMIN SERPL BCP-MCNC: 4.4 G/DL (ref 3.4–5)
ALP SERPL-CCNC: 67 U/L (ref 33–136)
ALT SERPL W P-5'-P-CCNC: 7 U/L (ref 10–52)
ANION GAP SERPL CALC-SCNC: 11 MMOL/L (ref 10–20)
AST SERPL W P-5'-P-CCNC: 11 U/L (ref 9–39)
B2 MICROGLOB SERPL-MCNC: 2.3 MG/L (ref 0.7–2.2)
BASOPHILS # BLD AUTO: 0.01 X10*3/UL (ref 0–0.1)
BASOPHILS NFR BLD AUTO: 0.2 %
BILIRUB SERPL-MCNC: 0.5 MG/DL (ref 0–1.2)
BUN SERPL-MCNC: 19 MG/DL (ref 6–23)
CALCIUM SERPL-MCNC: 9.9 MG/DL (ref 8.6–10.3)
CHLORIDE SERPL-SCNC: 103 MMOL/L (ref 98–107)
CO2 SERPL-SCNC: 35 MMOL/L (ref 21–32)
CREAT SERPL-MCNC: 1.1 MG/DL (ref 0.5–1.3)
EGFRCR SERPLBLD CKD-EPI 2021: 71 ML/MIN/1.73M*2
EOSINOPHIL # BLD AUTO: 0 X10*3/UL (ref 0–0.4)
EOSINOPHIL NFR BLD AUTO: 0 %
ERYTHROCYTE [DISTWIDTH] IN BLOOD BY AUTOMATED COUNT: 15.1 % (ref 11.5–14.5)
GLUCOSE SERPL-MCNC: 113 MG/DL (ref 74–99)
HCT VFR BLD AUTO: 41.6 % (ref 41–52)
HGB BLD-MCNC: 13.1 G/DL (ref 13.5–17.5)
IGA SERPL-MCNC: 170 MG/DL (ref 70–400)
IGG SERPL-MCNC: 1240 MG/DL (ref 700–1600)
IGM SERPL-MCNC: 100 MG/DL (ref 40–230)
IMM GRANULOCYTES # BLD AUTO: 0.01 X10*3/UL (ref 0–0.5)
IMM GRANULOCYTES NFR BLD AUTO: 0.2 % (ref 0–0.9)
KAPPA LC SERPL-MCNC: 1.6 MG/DL (ref 0.33–1.94)
KAPPA LC/LAMBDA SER: 1.06 {RATIO} (ref 0.26–1.65)
LAMBDA LC SERPL-MCNC: 1.51 MG/DL (ref 0.57–2.63)
LYMPHOCYTES # BLD AUTO: 1.1 X10*3/UL (ref 0.8–3)
LYMPHOCYTES NFR BLD AUTO: 23.8 %
MCH RBC QN AUTO: 25.2 PG (ref 26–34)
MCHC RBC AUTO-ENTMCNC: 31.5 G/DL (ref 32–36)
MCV RBC AUTO: 80 FL (ref 80–100)
MONOCYTES # BLD AUTO: 0.24 X10*3/UL (ref 0.05–0.8)
MONOCYTES NFR BLD AUTO: 5.2 %
NEUTROPHILS # BLD AUTO: 3.27 X10*3/UL (ref 1.6–5.5)
NEUTROPHILS NFR BLD AUTO: 70.6 %
NRBC BLD-RTO: 0 /100 WBCS (ref 0–0)
PLATELET # BLD AUTO: 166 X10*3/UL (ref 150–450)
POTASSIUM SERPL-SCNC: 4 MMOL/L (ref 3.5–5.3)
PROT SERPL-MCNC: 6.9 G/DL (ref 6.4–8.2)
PROT SERPL-MCNC: 7.2 G/DL (ref 6.4–8.2)
RBC # BLD AUTO: 5.19 X10*6/UL (ref 4.5–5.9)
SODIUM SERPL-SCNC: 145 MMOL/L (ref 136–145)
WBC # BLD AUTO: 4.6 X10*3/UL (ref 4.4–11.3)

## 2024-11-14 PROCEDURE — 1036F TOBACCO NON-USER: CPT

## 2024-11-14 PROCEDURE — 99215 OFFICE O/P EST HI 40 MIN: CPT

## 2024-11-14 PROCEDURE — 84155 ASSAY OF PROTEIN SERUM: CPT

## 2024-11-14 PROCEDURE — 86334 IMMUNOFIX E-PHORESIS SERUM: CPT

## 2024-11-14 PROCEDURE — 3077F SYST BP >= 140 MM HG: CPT

## 2024-11-14 PROCEDURE — 82784 ASSAY IGA/IGD/IGG/IGM EACH: CPT

## 2024-11-14 PROCEDURE — 1126F AMNT PAIN NOTED NONE PRSNT: CPT

## 2024-11-14 PROCEDURE — 84075 ASSAY ALKALINE PHOSPHATASE: CPT

## 2024-11-14 PROCEDURE — 82232 ASSAY OF BETA-2 PROTEIN: CPT

## 2024-11-14 PROCEDURE — 83521 IG LIGHT CHAINS FREE EACH: CPT

## 2024-11-14 PROCEDURE — 36415 COLL VENOUS BLD VENIPUNCTURE: CPT

## 2024-11-14 PROCEDURE — 3078F DIAST BP <80 MM HG: CPT

## 2024-11-14 PROCEDURE — 1159F MED LIST DOCD IN RCRD: CPT

## 2024-11-14 PROCEDURE — 85025 COMPLETE CBC W/AUTO DIFF WBC: CPT

## 2024-11-14 RX ORDER — DOCUSATE SODIUM 100 MG/1
100 CAPSULE, LIQUID FILLED ORAL 2 TIMES DAILY
Qty: 60 CAPSULE | Refills: 1 | Status: SHIPPED | OUTPATIENT
Start: 2024-11-14 | End: 2024-11-14

## 2024-11-14 RX ORDER — AMLODIPINE BESYLATE 10 MG/1
10 TABLET ORAL DAILY
Qty: 30 TABLET | Refills: 2 | Status: SHIPPED | OUTPATIENT
Start: 2024-11-14 | End: 2024-11-14

## 2024-11-14 RX ORDER — AMLODIPINE BESYLATE 10 MG/1
10 TABLET ORAL DAILY
Qty: 30 TABLET | Refills: 2 | Status: SHIPPED | OUTPATIENT
Start: 2024-11-14

## 2024-11-14 RX ORDER — DOCUSATE SODIUM 100 MG/1
100 CAPSULE, LIQUID FILLED ORAL 2 TIMES DAILY
Qty: 60 CAPSULE | Refills: 1 | Status: SHIPPED | OUTPATIENT
Start: 2024-11-14

## 2024-11-14 ASSESSMENT — ENCOUNTER SYMPTOMS
CONSTIPATION: 1
EXTREMITY WEAKNESS: 1

## 2024-11-14 ASSESSMENT — PAIN SCALES - GENERAL: PAINLEVEL_OUTOF10: 0-NO PAIN

## 2024-11-15 ENCOUNTER — SOCIAL WORK (OUTPATIENT)
Dept: HEMATOLOGY/ONCOLOGY | Facility: HOSPITAL | Age: 74
End: 2024-11-15
Payer: MEDICARE

## 2024-11-18 LAB
ALBUMIN: 4.1 G/DL (ref 3.4–5)
ALPHA 1 GLOBULIN: 0.3 G/DL (ref 0.2–0.6)
ALPHA 2 GLOBULIN: 0.7 G/DL (ref 0.4–1.1)
BETA GLOBULIN: 0.7 G/DL (ref 0.5–1.2)
GAMMA GLOBULIN: 1.1 G/DL (ref 0.5–1.4)
IMMUNOFIXATION COMMENT: NORMAL
PATH REVIEW - SERUM IMMUNOFIXATION: NORMAL
PATH REVIEW-SERUM PROTEIN ELECTROPHORESIS: NORMAL
PROTEIN ELECTROPHORESIS COMMENT: NORMAL

## 2024-11-29 DIAGNOSIS — I10 PRIMARY HYPERTENSION: ICD-10-CM

## 2024-12-10 RX ORDER — AMLODIPINE BESYLATE 10 MG/1
10 TABLET ORAL DAILY
Qty: 90 TABLET | Refills: 1 | OUTPATIENT
Start: 2024-12-10

## 2024-12-15 DIAGNOSIS — K59.00 CONSTIPATION, UNSPECIFIED CONSTIPATION TYPE: ICD-10-CM

## 2024-12-17 RX ORDER — DOCUSATE SODIUM 100 MG/1
100 CAPSULE, LIQUID FILLED ORAL 2 TIMES DAILY
Qty: 60 CAPSULE | Refills: 1 | Status: SHIPPED | OUTPATIENT
Start: 2024-12-17

## 2024-12-19 ENCOUNTER — SOCIAL WORK (OUTPATIENT)
Dept: HEMATOLOGY/ONCOLOGY | Facility: HOSPITAL | Age: 74
End: 2024-12-19
Payer: MEDICARE

## 2024-12-19 NOTE — PROGRESS NOTES
SW was unable to meet with patient on this day to discuss possible enrollment with [facility] services, as patient did not present for appointment.  Will follow up with patient in the future regarding this topic, as able.

## 2025-01-09 ENCOUNTER — LAB (OUTPATIENT)
Dept: LAB | Facility: HOSPITAL | Age: 75
End: 2025-01-09
Payer: MEDICARE

## 2025-01-09 ENCOUNTER — OFFICE VISIT (OUTPATIENT)
Dept: HEMATOLOGY/ONCOLOGY | Facility: HOSPITAL | Age: 75
End: 2025-01-09
Payer: MEDICARE

## 2025-01-09 ENCOUNTER — APPOINTMENT (OUTPATIENT)
Dept: HEMATOLOGY/ONCOLOGY | Facility: HOSPITAL | Age: 75
End: 2025-01-09
Payer: MEDICARE

## 2025-01-09 ENCOUNTER — SOCIAL WORK (OUTPATIENT)
Dept: HEMATOLOGY/ONCOLOGY | Facility: HOSPITAL | Age: 75
End: 2025-01-09

## 2025-01-09 VITALS
HEART RATE: 84 BPM | BODY MASS INDEX: 20.53 KG/M2 | WEIGHT: 138.01 LBS | OXYGEN SATURATION: 98 % | SYSTOLIC BLOOD PRESSURE: 159 MMHG | RESPIRATION RATE: 16 BRPM | DIASTOLIC BLOOD PRESSURE: 81 MMHG | TEMPERATURE: 97 F

## 2025-01-09 DIAGNOSIS — C90.00 MULTIPLE MYELOMA, REMISSION STATUS UNSPECIFIED (MULTI): ICD-10-CM

## 2025-01-09 DIAGNOSIS — C90.00 MULTIPLE MYELOMA, REMISSION STATUS UNSPECIFIED (MULTI): Primary | ICD-10-CM

## 2025-01-09 LAB
ALBUMIN SERPL BCP-MCNC: 4.5 G/DL (ref 3.4–5)
ALP SERPL-CCNC: 67 U/L (ref 33–136)
ALT SERPL W P-5'-P-CCNC: 4 U/L (ref 10–52)
ANION GAP SERPL CALC-SCNC: 11 MMOL/L (ref 10–20)
AST SERPL W P-5'-P-CCNC: 11 U/L (ref 9–39)
BASOPHILS # BLD AUTO: 0.01 X10*3/UL (ref 0–0.1)
BASOPHILS NFR BLD AUTO: 0.2 %
BILIRUB SERPL-MCNC: 0.5 MG/DL (ref 0–1.2)
BUN SERPL-MCNC: 16 MG/DL (ref 6–23)
CALCIUM SERPL-MCNC: 9.8 MG/DL (ref 8.6–10.3)
CHLORIDE SERPL-SCNC: 104 MMOL/L (ref 98–107)
CO2 SERPL-SCNC: 35 MMOL/L (ref 21–32)
CREAT SERPL-MCNC: 1.03 MG/DL (ref 0.5–1.3)
EGFRCR SERPLBLD CKD-EPI 2021: 77 ML/MIN/1.73M*2
EOSINOPHIL # BLD AUTO: 0.01 X10*3/UL (ref 0–0.4)
EOSINOPHIL NFR BLD AUTO: 0.2 %
ERYTHROCYTE [DISTWIDTH] IN BLOOD BY AUTOMATED COUNT: 15.3 % (ref 11.5–14.5)
GLUCOSE SERPL-MCNC: 107 MG/DL (ref 74–99)
HCT VFR BLD AUTO: 41.3 % (ref 41–52)
HGB BLD-MCNC: 13.2 G/DL (ref 13.5–17.5)
IGA SERPL-MCNC: 145 MG/DL (ref 70–400)
IGG SERPL-MCNC: 1210 MG/DL (ref 700–1600)
IGM SERPL-MCNC: 77 MG/DL (ref 40–230)
IMM GRANULOCYTES # BLD AUTO: 0.01 X10*3/UL (ref 0–0.5)
IMM GRANULOCYTES NFR BLD AUTO: 0.2 % (ref 0–0.9)
KAPPA LC SERPL-MCNC: 1.68 MG/DL (ref 0.33–1.94)
KAPPA LC/LAMBDA SER: 1.08 {RATIO} (ref 0.26–1.65)
LAMBDA LC SERPL-MCNC: 1.55 MG/DL (ref 0.57–2.63)
LYMPHOCYTES # BLD AUTO: 1.05 X10*3/UL (ref 0.8–3)
LYMPHOCYTES NFR BLD AUTO: 24.6 %
MCH RBC QN AUTO: 26.4 PG (ref 26–34)
MCHC RBC AUTO-ENTMCNC: 32 G/DL (ref 32–36)
MCV RBC AUTO: 83 FL (ref 80–100)
MONOCYTES # BLD AUTO: 0.21 X10*3/UL (ref 0.05–0.8)
MONOCYTES NFR BLD AUTO: 4.9 %
NEUTROPHILS # BLD AUTO: 2.97 X10*3/UL (ref 1.6–5.5)
NEUTROPHILS NFR BLD AUTO: 69.9 %
NRBC BLD-RTO: 0 /100 WBCS (ref 0–0)
PLATELET # BLD AUTO: 186 X10*3/UL (ref 150–450)
POTASSIUM SERPL-SCNC: 4 MMOL/L (ref 3.5–5.3)
PROT SERPL-MCNC: 7.1 G/DL (ref 6.4–8.2)
PROT SERPL-MCNC: 7.2 G/DL (ref 6.4–8.2)
RBC # BLD AUTO: 5 X10*6/UL (ref 4.5–5.9)
SODIUM SERPL-SCNC: 146 MMOL/L (ref 136–145)
WBC # BLD AUTO: 4.3 X10*3/UL (ref 4.4–11.3)

## 2025-01-09 PROCEDURE — 82784 ASSAY IGA/IGD/IGG/IGM EACH: CPT

## 2025-01-09 PROCEDURE — 85025 COMPLETE CBC W/AUTO DIFF WBC: CPT

## 2025-01-09 PROCEDURE — 1036F TOBACCO NON-USER: CPT

## 2025-01-09 PROCEDURE — 1159F MED LIST DOCD IN RCRD: CPT

## 2025-01-09 PROCEDURE — 83521 IG LIGHT CHAINS FREE EACH: CPT

## 2025-01-09 PROCEDURE — 1126F AMNT PAIN NOTED NONE PRSNT: CPT

## 2025-01-09 PROCEDURE — 3079F DIAST BP 80-89 MM HG: CPT

## 2025-01-09 PROCEDURE — 3077F SYST BP >= 140 MM HG: CPT

## 2025-01-09 PROCEDURE — 84155 ASSAY OF PROTEIN SERUM: CPT

## 2025-01-09 PROCEDURE — 86334 IMMUNOFIX E-PHORESIS SERUM: CPT

## 2025-01-09 PROCEDURE — 99215 OFFICE O/P EST HI 40 MIN: CPT

## 2025-01-09 PROCEDURE — 36415 COLL VENOUS BLD VENIPUNCTURE: CPT

## 2025-01-09 PROCEDURE — 80053 COMPREHEN METABOLIC PANEL: CPT

## 2025-01-09 ASSESSMENT — ENCOUNTER SYMPTOMS
EXTREMITY WEAKNESS: 1
ENDOCRINE NEGATIVE: 1
CONSTIPATION: 1
CONSTITUTIONAL NEGATIVE: 1
RESPIRATORY NEGATIVE: 1
EYES NEGATIVE: 1
CARDIOVASCULAR NEGATIVE: 1
HEMATOLOGIC/LYMPHATIC NEGATIVE: 1
PSYCHIATRIC NEGATIVE: 1

## 2025-01-09 ASSESSMENT — PAIN SCALES - GENERAL: PAINLEVEL_OUTOF10: 0-NO PAIN

## 2025-01-09 NOTE — PROGRESS NOTES
Community Resource Referral  Referral Source: EFRAIN Cruz NP  Identified Need: adult  services, home care, aide services, medication management  Agency Name: [facility]  Referral Placed via: Request for outreach placed via online portal  Additional Information: Patient was made aware of available services through [facility], and is agreeable to speaking with [employer] staff to see if he would be interested in enrollment. He was made aware that adjustments to insurance would be made if he enrolls.  Today specifically, patient was open to support in medication management, with the possibility of other support services as well.    Pt agreeable to plan. SW will continue to follow and will update chart with any correspondence received from [employer] staff with patient's possible enrollment status.

## 2025-01-09 NOTE — PROGRESS NOTES
Patient ID: Varinder Lizama is a 74 y.o. male.    Oncology History Overview Note   Smoldering multiple myeloma, ISS stage II:  Characterized by IgG lambda-restricted paraprotein, initially 2.9 g/dL without evidence of end-organ damage. No lytic bone lesions. No anemia. No hypercalcemia. No renal dysfunction. 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 His kappa lambda light chains are low risk. He also has microcytosis likely secondary to alpha-thalassemia  versus iron deficiency.     Symptomatic IgG Lambda Multiple Myeloma ISS stage II   Characterized by anemia noted in 8/2016    Lenalidomide (25 mg daily 21 days on 7 days off) plus Dexamethasone (40 mg once weekly) started on 9/15/2016, stopped briefly due to rash restarted on 12/17/2016    Started maintenance Lenalidomide 10 mg  5/2017 every other day decreasing to 2.5 mg daily 12/2017.    PET/CT (11/7/23):   There is no hypermetabolic activity to suggest active focal  neoplastic process. No increased FDG uptake in the right scapula to  suggest disease involvement.    Continued pancytopenia noted through 2023. Revlimid dose reduced to 2.5mg 3 weeks on, 1 week off in 1/2024.     Held Revlimid as of 2/2024 due to continued cytopenias.    -- NORMOCELLULAR BONE MARROW WITH  MATURING TRILINEAGE HEMATOPOESIS AND GRANULOCYTIC HYPOPLASIA, SEE NOTE  -- NO DEFINITE MORPHOLOGIC OR IMMUNOPHENOTYPIC EVIDENCE OF PLASMA MAYA NEOPLASM     NOTE: Granulopoiesis appears decreased but the granulocytes present show complete maturation. The finding is of unclear etiology. Clinical correlation and correlation with pending genetic/molecular studies suggested.  Decreased iron storage    - Continue to hold revlimid at this time        Multiple myeloma   10/20/2023 Initial Diagnosis    Multiple myeloma (CMS/HCC)         SUBJECTIVE     2/15/24: Revlimid held d/t  cytopenia  3/28/24: No acute complaints at this time. Snow is concerned about his worsening memory loss and needing more help with daily ADL.  6/27/24: Varinder reports feeling well today. He states his energy is good and he is eating well. His friend and caretaker, Snow, feels that he needs more assistance at home with cooking/ bathing/ ADLs. Denies n/v/d/c, weight loss, fever, chill, dizziness, night sweats.   11/14/24: Reports feeling well today. Notes he has difficulty moving around. Feels he is constipated occasionally but has BM approximately every other day. Denies fevers, pain, SOB, swelling, weight loss, night sweats, neuropathy, bruising  1/9/25: reports feeling well today, struggles with constipation but has not been using stool softener. Encouraged to use colace BID. SW present for visit today to discuss referral to [facility] program. Denies fevers, n/v/d/c, rash, lymphadenopathy, new pain, SOB, chest pain, edema.      Review of Systems   Constitutional: Negative.    HENT:  Negative.     Eyes: Negative.    Respiratory: Negative.     Cardiovascular: Negative.    Gastrointestinal:  Positive for constipation.   Endocrine: Negative.    Genitourinary: Negative.     Skin: Negative.    Neurological:  Positive for extremity weakness.   Hematological: Negative.    Psychiatric/Behavioral: Negative.           OBJECTIVE   BSA: 1.74 meters squared    /81   Pulse 84   Temp 36.1 °C (97 °F)   Resp 16   Wt 62.6 kg (138 lb 0.1 oz)   SpO2 98%   BMI 20.53 kg/m²   Vital signs reviewed in flowsheets.   Physical Exam  Constitutional:       Appearance: He is normal weight.   HENT:      Head: Normocephalic and atraumatic.      Mouth/Throat:      Mouth: Mucous membranes are dry.      Comments: brown plaque on tongue  Eyes:      Extraocular Movements: Extraocular movements intact.      Conjunctiva/sclera: Conjunctivae normal.      Pupils: Pupils are equal, round, and reactive to light.   Cardiovascular:      Pulses: Normal  pulses.      Heart sounds: Normal heart sounds.   Pulmonary:      Effort: Pulmonary effort is normal.      Breath sounds: Normal breath sounds.   Abdominal:      General: Abdomen is flat. Bowel sounds are normal.      Palpations: Abdomen is soft.   Musculoskeletal:         General: Normal range of motion.      Cervical back: Normal range of motion and neck supple.   Skin:     General: Skin is warm and dry.   Neurological:      Mental Status: He is alert. Mental status is at baseline.         Performance Status:  Karnofsky Score: 60 - Requires occasional assistance, but is able care for most of personal needs    Past Medical History:   Diagnosis Date    Age-related nuclear cataract, bilateral 11/23/2016    Age-related nuclear cataract of both eyes    Angioneurotic edema, initial encounter 04/07/2015    ACE inhibitor-aggravated angioedema    Cortical age-related cataract, bilateral 11/23/2016    Cortical age-related cataract of both eyes    Food insecurity 05/19/2022    Food insecurity    Keratoconjunctivitis due to adenovirus 08/04/2016    EKC (epidemic keratoconjunctivitis)    Posterior subcapsular polar age-related cataract, right eye 10/10/2016    Posterior subcapsular polar age-related cataract of right eye    Viral conjunctivitis, unspecified 10/10/2016    Acute viral conjunctivitis of both eyes     Past Surgical History:   Procedure Laterality Date    OTHER SURGICAL HISTORY  07/28/2017    Prior Surgical Procedure Not Done       Family History   Problem Relation Name Age of Onset    Heart attack Mother      Heart attack Father             MONITORING     MARKERS RECENT LAB VALUES   Free Kappa Light Chains Lab Results   Component Value Date    KAPPA 1.60 11/14/2024    KAPPA 1.53 06/27/2024    KAPPA 1.92 04/25/2024      Free Lambda Light Chains Lab Results   Component Value Date    LAMBDA 1.51 11/14/2024    LAMBDA 1.52 06/27/2024    LAMBDA 1.51 04/25/2024      Free Light Chain Ratio Lab Results   Component Value  Date    KAPLS 1.06 11/14/2024    KAPLS 1.01 06/27/2024    KAPLS 1.27 04/25/2024       Immunofixation Lab Results   Component Value Date    IEPIN No monoclonal protein detected by immunofixation. 11/14/2024    IEPIN No monoclonal protein detected by immunofixation. 06/27/2024    IEPIN No monoclonal protein detected by immunofixation. 04/25/2024       IgG Lab Results   Component Value Date    IGG 1,240 11/14/2024    IGG 1,170 06/27/2024    IGG 1,140 04/25/2024      IgA Lab Results   Component Value Date     11/14/2024     06/27/2024     04/25/2024      IgM Lab Results   Component Value Date     11/14/2024    IGM 67 06/27/2024    IGM 61 04/25/2024        Other Labs  Lab Results   Component Value Date    WBC 4.6 11/14/2024    NRBC 0.0 11/14/2024    RBC 5.19 11/14/2024    HGB 13.1 (L) 11/14/2024    HCT 41.6 11/14/2024    MCV 80 11/14/2024    MCH 25.2 (L) 11/14/2024    MCHC 31.5 (L) 11/14/2024    RDW 15.1 (H) 11/14/2024     11/14/2024    IGPCT 0.2 11/14/2024    LYMPHOPCT 23.8 11/14/2024    MONOPCT 5.2 11/14/2024    EOSPCT 0.0 11/14/2024    BASOPCT 0.2 11/14/2024    NEUTROABS 3.27 11/14/2024    MONOSABS 0.24 11/14/2024    EOSABS 0.00 11/14/2024       Lab Results   Component Value Date    GLUCOSE 113 (H) 11/14/2024     11/14/2024    K 4.0 11/14/2024     11/14/2024    CO2 35 (H) 11/14/2024    ANIONGAP 11 11/14/2024    BUN 19 11/14/2024    CREATININE 1.10 11/14/2024    EGFR 71 11/14/2024    CALCIUM 9.9 11/14/2024    ALBUMIN 4.4 11/14/2024    ALKPHOS 67 11/14/2024    PROT 7.2 11/14/2024    PROT 6.9 11/14/2024    AST 11 11/14/2024    BILITOT 0.5 11/14/2024    ALT 7 (L) 11/14/2024       Lab Results   Component Value Date     (H) 03/28/2024       ASSESSMENT & PLAN   Multiple Myeloma, IgG lambda, initially diagnosed in 7/2012  - Smoldering MM-> progressed in 8/2016 R-ISS stage II  - started on Rd on 9/15/16-> dose reduction>>> 2.5 mg currently  - Last bone survey in 5/2021:  Small lesion of the right infra glenoid scapula unchanged from multiple prior studies. No new osseous lesions identified.  - Recent SPEP/IF: no measurable paraprotein  - Last UPEP/IF in 5/2016-> 182.4 mg/L IgG lambda  - Continue Revlimid 2.5mg   - Zometa last 11/18/21, no plans to resume at this time  - cont to monitor Myeloma panel every 2 months.  - Negative PET/CT 11/7/23  - Revlimid decreased to 3 weeks on 1 week off 1/2024  - HOLD Revlimid as of 2/15/24  - recent BMBx- no morphologic evidence of plasma cell neoplasm.  - continue monitor      Pancytopenia  - persistent after Revlimid was held  - BMBx from 2/22/24 as above -genetic/molecular study unremarkable.    Oral Candidiasis  -14 days nystatin mouth rinse      Vitamin B12:  - Last injection 11/18/21     CKD  since 7/2017  - stable     Hypokalemia:  - Taking 20meq K TID  - K 3.1, pt. Stated he was only taking 20meq BID, encouraged TID dosing  - discontinued HCTZ  - advised to see PCP for HTN meds. 1X refill today    Constipation  - colace sent      Podiatry:  - Dry skin on feet, nails need care  - Referral to podiatry      RTC: 1 mo       MASON Nole-CNP

## 2025-01-13 LAB
ALBUMIN: 4.2 G/DL (ref 3.4–5)
ALPHA 1 GLOBULIN: 0.3 G/DL (ref 0.2–0.6)
ALPHA 2 GLOBULIN: 0.8 G/DL (ref 0.4–1.1)
BETA GLOBULIN: 0.8 G/DL (ref 0.5–1.2)
GAMMA GLOBULIN: 1.1 G/DL (ref 0.5–1.4)
IMMUNOFIXATION COMMENT: NORMAL
PATH REVIEW - SERUM IMMUNOFIXATION: NORMAL
PATH REVIEW-SERUM PROTEIN ELECTROPHORESIS: NORMAL
PROTEIN ELECTROPHORESIS COMMENT: NORMAL

## 2025-02-10 DIAGNOSIS — K59.00 CONSTIPATION, UNSPECIFIED CONSTIPATION TYPE: ICD-10-CM

## 2025-02-11 RX ORDER — DOCUSATE SODIUM 100 MG/1
100 CAPSULE, LIQUID FILLED ORAL 2 TIMES DAILY
Qty: 60 CAPSULE | Refills: 1 | Status: SHIPPED | OUTPATIENT
Start: 2025-02-11

## 2025-02-20 ENCOUNTER — TELEPHONE (OUTPATIENT)
Dept: HEMATOLOGY/ONCOLOGY | Facility: HOSPITAL | Age: 75
End: 2025-02-20
Payer: MEDICARE

## 2025-02-20 ENCOUNTER — LAB (OUTPATIENT)
Dept: LAB | Facility: HOSPITAL | Age: 75
End: 2025-02-20
Payer: MEDICARE

## 2025-02-20 ENCOUNTER — OFFICE VISIT (OUTPATIENT)
Dept: HEMATOLOGY/ONCOLOGY | Facility: HOSPITAL | Age: 75
End: 2025-02-20
Payer: MEDICARE

## 2025-02-20 VITALS
BODY MASS INDEX: 19.91 KG/M2 | TEMPERATURE: 98.1 F | OXYGEN SATURATION: 98 % | WEIGHT: 133.8 LBS | RESPIRATION RATE: 17 BRPM | HEART RATE: 70 BPM | SYSTOLIC BLOOD PRESSURE: 161 MMHG | DIASTOLIC BLOOD PRESSURE: 90 MMHG

## 2025-02-20 DIAGNOSIS — C90.00 MULTIPLE MYELOMA, REMISSION STATUS UNSPECIFIED (MULTI): ICD-10-CM

## 2025-02-20 LAB
ALBUMIN SERPL BCP-MCNC: 4.1 G/DL (ref 3.4–5)
ALP SERPL-CCNC: 65 U/L (ref 33–136)
ALT SERPL W P-5'-P-CCNC: 6 U/L (ref 10–52)
ANION GAP SERPL CALC-SCNC: 11 MMOL/L (ref 10–20)
AST SERPL W P-5'-P-CCNC: 10 U/L (ref 9–39)
BASOPHILS # BLD AUTO: 0.01 X10*3/UL (ref 0–0.1)
BASOPHILS NFR BLD AUTO: 0.3 %
BILIRUB SERPL-MCNC: 0.4 MG/DL (ref 0–1.2)
BUN SERPL-MCNC: 15 MG/DL (ref 6–23)
CALCIUM SERPL-MCNC: 9.2 MG/DL (ref 8.6–10.6)
CHLORIDE SERPL-SCNC: 103 MMOL/L (ref 98–107)
CO2 SERPL-SCNC: 33 MMOL/L (ref 21–32)
CREAT SERPL-MCNC: 0.95 MG/DL (ref 0.5–1.3)
EGFRCR SERPLBLD CKD-EPI 2021: 85 ML/MIN/1.73M*2
EOSINOPHIL # BLD AUTO: 0.01 X10*3/UL (ref 0–0.4)
EOSINOPHIL NFR BLD AUTO: 0.3 %
ERYTHROCYTE [DISTWIDTH] IN BLOOD BY AUTOMATED COUNT: 13.8 % (ref 11.5–14.5)
GLUCOSE SERPL-MCNC: 107 MG/DL (ref 74–99)
HCT VFR BLD AUTO: 38.2 % (ref 41–52)
HGB BLD-MCNC: 12.4 G/DL (ref 13.5–17.5)
IGA SERPL-MCNC: 146 MG/DL (ref 70–400)
IGG SERPL-MCNC: 1180 MG/DL (ref 700–1600)
IGM SERPL-MCNC: 70 MG/DL (ref 40–230)
IMM GRANULOCYTES # BLD AUTO: 0.01 X10*3/UL (ref 0–0.5)
IMM GRANULOCYTES NFR BLD AUTO: 0.3 % (ref 0–0.9)
KAPPA LC SERPL-MCNC: 1.5 MG/DL (ref 0.33–1.94)
KAPPA LC/LAMBDA SER: 1.01 {RATIO} (ref 0.26–1.65)
LAMBDA LC SERPL-MCNC: 1.48 MG/DL (ref 0.57–2.63)
LYMPHOCYTES # BLD AUTO: 1.08 X10*3/UL (ref 0.8–3)
LYMPHOCYTES NFR BLD AUTO: 28.1 %
MCH RBC QN AUTO: 26.6 PG (ref 26–34)
MCHC RBC AUTO-ENTMCNC: 32.5 G/DL (ref 32–36)
MCV RBC AUTO: 82 FL (ref 80–100)
MONOCYTES # BLD AUTO: 0.21 X10*3/UL (ref 0.05–0.8)
MONOCYTES NFR BLD AUTO: 5.5 %
NEUTROPHILS # BLD AUTO: 2.52 X10*3/UL (ref 1.6–5.5)
NEUTROPHILS NFR BLD AUTO: 65.5 %
NRBC BLD-RTO: 0 /100 WBCS (ref 0–0)
PLATELET # BLD AUTO: 168 X10*3/UL (ref 150–450)
POTASSIUM SERPL-SCNC: 3.4 MMOL/L (ref 3.5–5.3)
PROT SERPL-MCNC: 6.8 G/DL (ref 6.4–8.2)
PROT SERPL-MCNC: 7.3 G/DL (ref 6.4–8.2)
RBC # BLD AUTO: 4.66 X10*6/UL (ref 4.5–5.9)
SODIUM SERPL-SCNC: 144 MMOL/L (ref 136–145)
WBC # BLD AUTO: 3.8 X10*3/UL (ref 4.4–11.3)

## 2025-02-20 PROCEDURE — 80053 COMPREHEN METABOLIC PANEL: CPT

## 2025-02-20 PROCEDURE — 99215 OFFICE O/P EST HI 40 MIN: CPT | Performed by: INTERNAL MEDICINE

## 2025-02-20 PROCEDURE — 84165 PROTEIN E-PHORESIS SERUM: CPT | Performed by: STUDENT IN AN ORGANIZED HEALTH CARE EDUCATION/TRAINING PROGRAM

## 2025-02-20 PROCEDURE — 3077F SYST BP >= 140 MM HG: CPT | Performed by: INTERNAL MEDICINE

## 2025-02-20 PROCEDURE — 83521 IG LIGHT CHAINS FREE EACH: CPT

## 2025-02-20 PROCEDURE — 85025 COMPLETE CBC W/AUTO DIFF WBC: CPT

## 2025-02-20 PROCEDURE — 1126F AMNT PAIN NOTED NONE PRSNT: CPT | Performed by: INTERNAL MEDICINE

## 2025-02-20 PROCEDURE — 82784 ASSAY IGA/IGD/IGG/IGM EACH: CPT

## 2025-02-20 PROCEDURE — 86334 IMMUNOFIX E-PHORESIS SERUM: CPT

## 2025-02-20 PROCEDURE — 36415 COLL VENOUS BLD VENIPUNCTURE: CPT

## 2025-02-20 PROCEDURE — 1159F MED LIST DOCD IN RCRD: CPT | Performed by: INTERNAL MEDICINE

## 2025-02-20 PROCEDURE — 84155 ASSAY OF PROTEIN SERUM: CPT

## 2025-02-20 PROCEDURE — 86320 SERUM IMMUNOELECTROPHORESIS: CPT | Performed by: STUDENT IN AN ORGANIZED HEALTH CARE EDUCATION/TRAINING PROGRAM

## 2025-02-20 PROCEDURE — 3080F DIAST BP >= 90 MM HG: CPT | Performed by: INTERNAL MEDICINE

## 2025-02-20 RX ORDER — BENZTROPINE MESYLATE 1 MG/1
TABLET ORAL
Status: CANCELLED | OUTPATIENT
Start: 2025-02-20

## 2025-02-20 ASSESSMENT — PATIENT HEALTH QUESTIONNAIRE - PHQ9
SUM OF ALL RESPONSES TO PHQ9 QUESTIONS 1 AND 2: 0
1. LITTLE INTEREST OR PLEASURE IN DOING THINGS: NOT AT ALL
2. FEELING DOWN, DEPRESSED OR HOPELESS: NOT AT ALL

## 2025-02-20 ASSESSMENT — ENCOUNTER SYMPTOMS
CONSTITUTIONAL NEGATIVE: 1
EYES NEGATIVE: 1
ENDOCRINE NEGATIVE: 1
CONSTIPATION: 1
PSYCHIATRIC NEGATIVE: 1
CARDIOVASCULAR NEGATIVE: 1
HEMATOLOGIC/LYMPHATIC NEGATIVE: 1
RESPIRATORY NEGATIVE: 1

## 2025-02-20 ASSESSMENT — PAIN SCALES - GENERAL: PAINLEVEL_OUTOF10: 0-NO PAIN

## 2025-02-20 NOTE — PROGRESS NOTES
Patient ID: Varinder Lizama is a 75 y.o. male.    Oncology History Overview Note   Smoldering multiple myeloma, ISS stage II:  Characterized by IgG lambda-restricted paraprotein, initially 2.9 g/dL without evidence of end-organ damage. No lytic bone lesions. No anemia. No hypercalcemia. No renal dysfunction. 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 His kappa lambda light chains are low risk. He also has microcytosis likely secondary to alpha-thalassemia  versus iron deficiency.     Symptomatic IgG Lambda Multiple Myeloma ISS stage II   Characterized by anemia noted in 8/2016    Lenalidomide (25 mg daily 21 days on 7 days off) plus Dexamethasone (40 mg once weekly) started on 9/15/2016, stopped briefly due to rash restarted on 12/17/2016    Started maintenance Lenalidomide 10 mg  5/2017 every other day decreasing to 2.5 mg daily 12/2017.    PET/CT (11/7/23):   There is no hypermetabolic activity to suggest active focal  neoplastic process. No increased FDG uptake in the right scapula to  suggest disease involvement.    Continued pancytopenia noted through 2023. Revlimid dose reduced to 2.5mg 3 weeks on, 1 week off in 1/2024.     Held Revlimid as of 2/2024 due to continued cytopenias.    -- NORMOCELLULAR BONE MARROW WITH  MATURING TRILINEAGE HEMATOPOESIS AND GRANULOCYTIC HYPOPLASIA, SEE NOTE  -- NO DEFINITE MORPHOLOGIC OR IMMUNOPHENOTYPIC EVIDENCE OF PLASMA MAYA NEOPLASM     NOTE: Granulopoiesis appears decreased but the granulocytes present show complete maturation. The finding is of unclear etiology. Clinical correlation and correlation with pending genetic/molecular studies suggested.  Decreased iron storage    - Continue to hold revlimid at this time        Multiple myeloma   10/20/2023 Initial Diagnosis    Multiple myeloma (CMS/HCC)         SUBJECTIVE     2/15/24: Revlimid held d/t  cytopenia  3/28/24: No acute complaints at this time. Snow is concerned about his worsening memory loss and needing more help with daily ADL.  6/27/24: Varinder reports feeling well today. He states his energy is good and he is eating well. His friend and caretaker, Snow, feels that he needs more assistance at home with cooking/ bathing/ ADLs. Denies n/v/d/c, weight loss, fever, chill, dizziness, night sweats.   11/14/24: Reports feeling well today. Notes he has difficulty moving around. Feels he is constipated occasionally but has BM approximately every other day. Denies fevers, pain, SOB, swelling, weight loss, night sweats, neuropathy, bruising  1/9/25: reports feeling well today, struggles with constipation but has not been using stool softener. Encouraged to use colace BID. SW present for visit today to discuss referral to PACE program. Denies fevers, n/v/d/c, rash, lymphadenopathy, new pain, SOB, chest pain, edema.  2/20/25: no c/o. Came with his long time friend.     Review of Systems   Constitutional: Negative.    HENT:  Negative.     Eyes: Negative.    Respiratory: Negative.     Cardiovascular: Negative.    Gastrointestinal:  Positive for constipation.   Endocrine: Negative.    Genitourinary: Negative.     Skin: Negative.    Hematological: Negative.    Psychiatric/Behavioral: Negative.           OBJECTIVE   BSA: 1.72 meters squared    /90 (BP Location: Left arm, Patient Position: Sitting, BP Cuff Size: Adult)   Pulse 70   Temp 36.7 °C (98.1 °F) (Tympanic)   Resp 17   Wt 60.7 kg (133 lb 12.8 oz)   SpO2 98%   BMI 19.91 kg/m²   Vital signs reviewed in flowsheets.   Physical Exam  Constitutional:       Appearance: He is normal weight.   HENT:      Head: Normocephalic and atraumatic.      Mouth/Throat:      Mouth: Mucous membranes are dry.      Comments: brown plaque on tongue  Eyes:      Extraocular Movements: Extraocular movements intact.      Conjunctiva/sclera: Conjunctivae normal.      Pupils:  Pupils are equal, round, and reactive to light.   Cardiovascular:      Pulses: Normal pulses.      Heart sounds: Normal heart sounds.   Pulmonary:      Effort: Pulmonary effort is normal.      Breath sounds: Normal breath sounds.   Abdominal:      General: Abdomen is flat. Bowel sounds are normal.      Palpations: Abdomen is soft.   Musculoskeletal:         General: Normal range of motion.      Cervical back: Normal range of motion and neck supple.   Skin:     General: Skin is warm and dry.   Neurological:      Mental Status: He is alert. Mental status is at baseline.       Performance Status:  Karnofsky Score: 60 - Requires occasional assistance, but is able care for most of personal needs    Past Medical History:   Diagnosis Date   • Age-related nuclear cataract, bilateral 11/23/2016    Age-related nuclear cataract of both eyes   • Angioneurotic edema, initial encounter 04/07/2015    ACE inhibitor-aggravated angioedema   • Cortical age-related cataract, bilateral 11/23/2016    Cortical age-related cataract of both eyes   • Food insecurity 05/19/2022    Food insecurity   • Keratoconjunctivitis due to adenovirus 08/04/2016    EKC (epidemic keratoconjunctivitis)   • Posterior subcapsular polar age-related cataract, right eye 10/10/2016    Posterior subcapsular polar age-related cataract of right eye   • Viral conjunctivitis, unspecified 10/10/2016    Acute viral conjunctivitis of both eyes     Past Surgical History:   Procedure Laterality Date   • OTHER SURGICAL HISTORY  07/28/2017    Prior Surgical Procedure Not Done       Family History   Problem Relation Name Age of Onset   • Heart attack Mother     • Heart attack Father             MONITORING     MARKERS RECENT LAB VALUES   Free Kappa Light Chains Lab Results   Component Value Date    KAPPA 1.68 01/09/2025    KAPPA 1.60 11/14/2024    KAPPA 1.53 06/27/2024      Free Lambda Light Chains Lab Results   Component Value Date    LAMBDA 1.55 01/09/2025    LAMBDA 1.51  11/14/2024    LAMBDA 1.52 06/27/2024      Free Light Chain Ratio Lab Results   Component Value Date    KAPLS 1.08 01/09/2025    KAPLS 1.06 11/14/2024    KAPLS 1.01 06/27/2024       Immunofixation Lab Results   Component Value Date    IEPIN No monoclonal protein detected by immunofixation. 01/09/2025    IEPIN No monoclonal protein detected by immunofixation. 11/14/2024    IEPIN No monoclonal protein detected by immunofixation. 06/27/2024       IgG Lab Results   Component Value Date    IGG 1,210 01/09/2025    IGG 1,240 11/14/2024    IGG 1,170 06/27/2024      IgA Lab Results   Component Value Date     01/09/2025     11/14/2024     06/27/2024      IgM Lab Results   Component Value Date    IGM 77 01/09/2025     11/14/2024    IGM 67 06/27/2024        Other Labs  Lab Results   Component Value Date    WBC 3.8 (L) 02/20/2025    NRBC 0.0 02/20/2025    RBC 4.66 02/20/2025    HGB 12.4 (L) 02/20/2025    HCT 38.2 (L) 02/20/2025    MCV 82 02/20/2025    MCH 26.6 02/20/2025    MCHC 32.5 02/20/2025    RDW 13.8 02/20/2025     02/20/2025    IGPCT 0.3 02/20/2025    LYMPHOPCT 28.1 02/20/2025    MONOPCT 5.5 02/20/2025    EOSPCT 0.3 02/20/2025    BASOPCT 0.3 02/20/2025    NEUTROABS 2.52 02/20/2025    MONOSABS 0.21 02/20/2025    EOSABS 0.01 02/20/2025       Lab Results   Component Value Date    GLUCOSE 107 (H) 02/20/2025     02/20/2025    K 3.4 (L) 02/20/2025     02/20/2025    CO2 33 (H) 02/20/2025    ANIONGAP 11 02/20/2025    BUN 15 02/20/2025    CREATININE 0.95 02/20/2025    EGFR 85 02/20/2025    CALCIUM 9.2 02/20/2025    ALBUMIN 4.1 02/20/2025    ALKPHOS 65 02/20/2025    PROT 7.3 02/20/2025    AST 10 02/20/2025    BILITOT 0.4 02/20/2025    ALT 6 (L) 02/20/2025       Lab Results   Component Value Date     (H) 03/28/2024       ASSESSMENT & PLAN   Multiple Myeloma, IgG lambda, initially diagnosed in 7/2012  - Smoldering MM-> progressed in 8/2016 R-ISS stage II  - started on Rd on  9/15/16-> dose reduction>>> 2.5 mg currently  - Last bone survey in 5/2021: Small lesion of the right infra glenoid scapula unchanged from multiple prior studies. No new osseous lesions identified.  - Recent SPEP/IF: no measurable paraprotein  - Last UPEP/IF in 5/2016-> 182.4 mg/L IgG lambda  - Continue Revlimid 2.5mg   - Zometa last 11/18/21, no plans to resume at this time  - cont to monitor Myeloma panel every 2 months.  - Negative PET/CT 11/7/23  - Revlimid decreased to 3 weeks on 1 week off 1/2024  - HOLD Revlimid as of 2/15/24  - recent BMBx- no morphologic evidence of plasma cell neoplasm.  - continue monitor q 2-3 months     Pancytopenia  - persistent after Revlimid was held  - BMBx from 2/22/24 as above -genetic/molecular study unremarkable.    Oral Candidiasis  -14 days nystatin mouth rinse      Vitamin B12:  - Last injection 11/18/21     CKD  since 7/2017  - stable     Hypokalemia:  - Taking 20meq K TID  - K 3.1, pt. Stated he was only taking 20meq BID, encouraged TID dosing  - discontinued HCTZ  - advised to see PCP for HTN meds. 1X refill today    Constipation  - colace sent      Podiatry:  - Dry skin on feet, nails need care  - Referral to podiatry      RTC: 1 mo       Ok-Mignon Cutler MD

## 2025-02-20 NOTE — TELEPHONE ENCOUNTER
Called Varinder's number to talk about dry mouth interventions and it did not go through. Voicemail left to call us back. Per Aleksandra LINCOLN, pt should try swish and spit with salt water and Biotin OTC. Also hard candy will help with dry mouth as well.

## 2025-02-24 LAB
ALBUMIN: 3.9 G/DL (ref 3.4–5)
ALPHA 1 GLOBULIN: 0.3 G/DL (ref 0.2–0.6)
ALPHA 2 GLOBULIN: 0.8 G/DL (ref 0.4–1.1)
BETA GLOBULIN: 0.8 G/DL (ref 0.5–1.2)
GAMMA GLOBULIN: 1 G/DL (ref 0.5–1.4)
IMMUNOFIXATION COMMENT: NORMAL
PATH REVIEW - SERUM IMMUNOFIXATION: NORMAL
PATH REVIEW-SERUM PROTEIN ELECTROPHORESIS: NORMAL
PROTEIN ELECTROPHORESIS COMMENT: NORMAL

## 2025-04-22 ASSESSMENT — ENCOUNTER SYMPTOMS
ENDOCRINE NEGATIVE: 1
EYES NEGATIVE: 1
PSYCHIATRIC NEGATIVE: 1
HEMATOLOGIC/LYMPHATIC NEGATIVE: 1
CONSTIPATION: 1
CONSTITUTIONAL NEGATIVE: 1
CARDIOVASCULAR NEGATIVE: 1
RESPIRATORY NEGATIVE: 1

## 2025-04-22 NOTE — PROGRESS NOTES
Patient ID: Varinder Lizama is a 75 y.o. male.    Oncology History Overview Note   Smoldering multiple myeloma, ISS stage II:  Characterized by IgG lambda-restricted paraprotein, initially 2.9 g/dL without evidence of end-organ damage. No lytic bone lesions. No anemia. No hypercalcemia. No renal dysfunction. 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 His kappa lambda light chains are low risk. He also has microcytosis likely secondary to alpha-thalassemia  versus iron deficiency.     Symptomatic IgG Lambda Multiple Myeloma ISS stage II   Characterized by anemia noted in 8/2016    Lenalidomide (25 mg daily 21 days on 7 days off) plus Dexamethasone (40 mg once weekly) started on 9/15/2016, stopped briefly due to rash restarted on 12/17/2016    Started maintenance Lenalidomide 10 mg  5/2017 every other day decreasing to 2.5 mg daily 12/2017.    PET/CT (11/7/23):   There is no hypermetabolic activity to suggest active focal  neoplastic process. No increased FDG uptake in the right scapula to  suggest disease involvement.    Continued pancytopenia noted through 2023. Revlimid dose reduced to 2.5mg 3 weeks on, 1 week off in 1/2024.     Held Revlimid as of 2/2024 due to continued cytopenias.    -- NORMOCELLULAR BONE MARROW WITH  MATURING TRILINEAGE HEMATOPOESIS AND GRANULOCYTIC HYPOPLASIA, SEE NOTE  -- NO DEFINITE MORPHOLOGIC OR IMMUNOPHENOTYPIC EVIDENCE OF PLASMA MAYA NEOPLASM     NOTE: Granulopoiesis appears decreased but the granulocytes present show complete maturation. The finding is of unclear etiology. Clinical correlation and correlation with pending genetic/molecular studies suggested.  Decreased iron storage    - Continue to hold revlimid at this time        Multiple myeloma   10/20/2023 Initial Diagnosis    Multiple myeloma (CMS/HCC)         SUBJECTIVE     2/15/24: Revlimid held d/t  cytopenia  3/28/24: No acute complaints at this time. Snow is concerned about his worsening memory loss and needing more help with daily ADL.  6/27/24: Varinder reports feeling well today. He states his energy is good and he is eating well. His friend and caretaker, Snow, feels that he needs more assistance at home with cooking/ bathing/ ADLs. Denies n/v/d/c, weight loss, fever, chill, dizziness, night sweats.   11/14/24: Reports feeling well today. Notes he has difficulty moving around. Feels he is constipated occasionally but has BM approximately every other day. Denies fevers, pain, SOB, swelling, weight loss, night sweats, neuropathy, bruising  1/9/25: reports feeling well today, struggles with constipation but has not been using stool softener. Encouraged to use colace BID. SW present for visit today to discuss referral to PACE program. Denies fevers, n/v/d/c, rash, lymphadenopathy, new pain, SOB, chest pain, edema.  2/20/25: no c/o. Came with his long time friend.   4/24/25: Varinder presents to clinic today for follow up, accompanied by his friend, Snow. Varinder reports feeling well overall today, but Snow notes that Varinder is walking slower than usual. Varinder endorses that he feels a little weaker than last visit. Denies pain, numbness, falls, new bony pain, unintentional weight loss, fevers, night sweats, chest pain, edema, SOB.       Review of Systems   Constitutional: Negative.    HENT:  Negative.     Eyes: Negative.    Respiratory: Negative.     Cardiovascular: Negative.    Gastrointestinal:  Positive for constipation.   Endocrine: Negative.    Genitourinary: Negative.     Skin: Negative.    Hematological: Negative.    Psychiatric/Behavioral: Negative.           OBJECTIVE   BSA: 1.66 meters squared    /76 (BP Location: Right arm, Patient Position: Sitting)   Pulse 80   Temp 36.9 °C (98.4 °F)   Resp 18   Wt 57 kg (125 lb 9.6 oz)   SpO2 100%   BMI 18.69 kg/m²   Vital signs reviewed in flowsheets.   Physical  Exam  Constitutional:       Appearance: He is normal weight.   HENT:      Head: Normocephalic and atraumatic.      Mouth/Throat:      Mouth: Mucous membranes are dry.      Comments: brown plaque on tongue  Eyes:      Extraocular Movements: Extraocular movements intact.      Conjunctiva/sclera: Conjunctivae normal.      Pupils: Pupils are equal, round, and reactive to light.   Cardiovascular:      Pulses: Normal pulses.      Heart sounds: Normal heart sounds.   Pulmonary:      Effort: Pulmonary effort is normal.      Breath sounds: Normal breath sounds.   Abdominal:      General: Abdomen is flat. Bowel sounds are normal.      Palpations: Abdomen is soft.   Musculoskeletal:         General: Normal range of motion.      Cervical back: Normal range of motion and neck supple.   Skin:     General: Skin is warm and dry.   Neurological:      Mental Status: He is alert. Mental status is at baseline.         Performance Status:  Karnofsky Score: 60 - Requires occasional assistance, but is able care for most of personal needs    Past Medical History:   Diagnosis Date    Age-related nuclear cataract, bilateral 11/23/2016    Age-related nuclear cataract of both eyes    Angioneurotic edema, initial encounter 04/07/2015    ACE inhibitor-aggravated angioedema    Cortical age-related cataract, bilateral 11/23/2016    Cortical age-related cataract of both eyes    Food insecurity 05/19/2022    Food insecurity    Keratoconjunctivitis due to adenovirus 08/04/2016    EKC (epidemic keratoconjunctivitis)    Posterior subcapsular polar age-related cataract, right eye 10/10/2016    Posterior subcapsular polar age-related cataract of right eye    Viral conjunctivitis, unspecified 10/10/2016    Acute viral conjunctivitis of both eyes     Past Surgical History:   Procedure Laterality Date    OTHER SURGICAL HISTORY  07/28/2017    Prior Surgical Procedure Not Done       Family History   Problem Relation Name Age of Onset    Heart attack Mother       Heart attack Father             MONITORING     MARKERS RECENT LAB VALUES   Free Kappa Light Chains Lab Results   Component Value Date    KAPPA 1.50 02/20/2025    KAPPA 1.68 01/09/2025    KAPPA 1.60 11/14/2024      Free Lambda Light Chains Lab Results   Component Value Date    LAMBDA 1.48 02/20/2025    LAMBDA 1.55 01/09/2025    LAMBDA 1.51 11/14/2024      Free Light Chain Ratio Lab Results   Component Value Date    KAPLS 1.01 02/20/2025    KAPLS 1.08 01/09/2025    KAPLS 1.06 11/14/2024       Immunofixation Lab Results   Component Value Date    IEPIN No monoclonal protein detected by immunofixation. 02/20/2025    IEPIN No monoclonal protein detected by immunofixation. 01/09/2025    IEPIN No monoclonal protein detected by immunofixation. 11/14/2024       IgG Lab Results   Component Value Date    IGG 1,180 02/20/2025    IGG 1,210 01/09/2025    IGG 1,240 11/14/2024      IgA Lab Results   Component Value Date     02/20/2025     01/09/2025     11/14/2024      IgM Lab Results   Component Value Date    IGM 70 02/20/2025    IGM 77 01/09/2025     11/14/2024        Other Labs  Lab Results   Component Value Date    WBC 4.1 (L) 04/24/2025    NRBC 0.0 04/24/2025    RBC 4.83 04/24/2025    HGB 12.9 (L) 04/24/2025    HCT 40.2 (L) 04/24/2025    MCV 83 04/24/2025    MCH 26.7 04/24/2025    MCHC 32.1 04/24/2025    RDW 13.7 04/24/2025     04/24/2025    IGPCT 0.2 04/24/2025    LYMPHOPCT 33.3 04/24/2025    MONOPCT 5.8 04/24/2025    EOSPCT 0.2 04/24/2025    BASOPCT 0.5 04/24/2025    NEUTROABS 2.46 04/24/2025    MONOSABS 0.24 04/24/2025    EOSABS 0.01 04/24/2025       Lab Results   Component Value Date    GLUCOSE 114 (H) 04/24/2025     04/24/2025    K 3.6 04/24/2025     04/24/2025    CO2 31 04/24/2025    ANIONGAP 11 04/24/2025    BUN 14 04/24/2025    CREATININE 1.01 04/24/2025    EGFR 79 04/24/2025    CALCIUM 9.4 04/24/2025    ALBUMIN 4.4 04/24/2025    ALKPHOS 57 04/24/2025    PROT 7.1  04/24/2025    AST 12 04/24/2025    BILITOT 0.5 04/24/2025    ALT 6 (L) 04/24/2025       Lab Results   Component Value Date     (H) 03/28/2024       ASSESSMENT & PLAN   Multiple Myeloma, IgG lambda, initially diagnosed in 7/2012  - Smoldering MM-> progressed in 8/2016 R-ISS stage II  - started on Rd on 9/15/16-> dose reduction>>> 2.5 mg currently  - Last bone survey in 5/2021: Small lesion of the right infra glenoid scapula unchanged from multiple prior studies. No new osseous lesions identified.  - Recent SPEP/IF: no measurable paraprotein  - Last UPEP/IF in 5/2016-> 182.4 mg/L IgG lambda  - Zometa last 11/18/21, no plans to resume at this time  - cont to monitor Myeloma panel every 2 months.  - Negative PET/CT 11/7/23  - Revlimid decreased to 3 weeks on 1 week off 1/2024  - discontinue Revlimid as of 2/15/24 due to cytopenia  - recent BMBx 2/22/24 - no morphologic evidence of plasma cell neoplasm.  - continue monitor q 2-3 months    Progressive Weakness Bilateral Lower Extremities  - xray lumbar spine ordered      Pancytopenia  - persistent after Revlimid was held  - BMBx from 2/22/24 as above -genetic/molecular study unremarkable.    CKD  since 7/2017  - Cr normalized     Hypokalemia:  - Taking 20meq K TID  - K 3.1, pt. Stated he was only taking 20meq BID, encouraged TID dosing  - discontinued HCTZ  - advised to see PCP for HTN meds. 1X refill today    Constipation  - colace sent        RTC: 2months       MASON Noel-CNP

## 2025-04-24 ENCOUNTER — OFFICE VISIT (OUTPATIENT)
Dept: HEMATOLOGY/ONCOLOGY | Facility: HOSPITAL | Age: 75
End: 2025-04-24
Payer: MEDICARE

## 2025-04-24 ENCOUNTER — HOSPITAL ENCOUNTER (OUTPATIENT)
Dept: RADIOLOGY | Facility: HOSPITAL | Age: 75
Discharge: HOME | End: 2025-04-24
Payer: MEDICARE

## 2025-04-24 ENCOUNTER — LAB (OUTPATIENT)
Dept: LAB | Facility: HOSPITAL | Age: 75
End: 2025-04-24
Payer: MEDICARE

## 2025-04-24 VITALS
BODY MASS INDEX: 18.69 KG/M2 | TEMPERATURE: 98.4 F | DIASTOLIC BLOOD PRESSURE: 76 MMHG | RESPIRATION RATE: 18 BRPM | SYSTOLIC BLOOD PRESSURE: 141 MMHG | OXYGEN SATURATION: 100 % | WEIGHT: 125.6 LBS | HEART RATE: 80 BPM

## 2025-04-24 DIAGNOSIS — I10 PRIMARY HYPERTENSION: ICD-10-CM

## 2025-04-24 DIAGNOSIS — C90.00 MULTIPLE MYELOMA, REMISSION STATUS UNSPECIFIED (MULTI): ICD-10-CM

## 2025-04-24 DIAGNOSIS — K59.00 CONSTIPATION, UNSPECIFIED CONSTIPATION TYPE: ICD-10-CM

## 2025-04-24 LAB
ALBUMIN SERPL BCP-MCNC: 4.4 G/DL (ref 3.4–5)
ALP SERPL-CCNC: 57 U/L (ref 33–136)
ALT SERPL W P-5'-P-CCNC: 6 U/L (ref 10–52)
ANION GAP SERPL CALC-SCNC: 11 MMOL/L (ref 10–20)
AST SERPL W P-5'-P-CCNC: 12 U/L (ref 9–39)
BASOPHILS # BLD AUTO: 0.02 X10*3/UL (ref 0–0.1)
BASOPHILS NFR BLD AUTO: 0.5 %
BILIRUB SERPL-MCNC: 0.5 MG/DL (ref 0–1.2)
BUN SERPL-MCNC: 14 MG/DL (ref 6–23)
CALCIUM SERPL-MCNC: 9.4 MG/DL (ref 8.6–10.3)
CHLORIDE SERPL-SCNC: 102 MMOL/L (ref 98–107)
CO2 SERPL-SCNC: 31 MMOL/L (ref 21–32)
CREAT SERPL-MCNC: 1.01 MG/DL (ref 0.5–1.3)
EGFRCR SERPLBLD CKD-EPI 2021: 79 ML/MIN/1.73M*2
EOSINOPHIL # BLD AUTO: 0.01 X10*3/UL (ref 0–0.4)
EOSINOPHIL NFR BLD AUTO: 0.2 %
ERYTHROCYTE [DISTWIDTH] IN BLOOD BY AUTOMATED COUNT: 13.7 % (ref 11.5–14.5)
GLUCOSE SERPL-MCNC: 114 MG/DL (ref 74–99)
HCT VFR BLD AUTO: 40.2 % (ref 41–52)
HGB BLD-MCNC: 12.9 G/DL (ref 13.5–17.5)
IGA SERPL-MCNC: 142 MG/DL (ref 70–400)
IGG SERPL-MCNC: 1120 MG/DL (ref 700–1600)
IGM SERPL-MCNC: 69 MG/DL (ref 40–230)
IMM GRANULOCYTES # BLD AUTO: 0.01 X10*3/UL (ref 0–0.5)
IMM GRANULOCYTES NFR BLD AUTO: 0.2 % (ref 0–0.9)
LYMPHOCYTES # BLD AUTO: 1.37 X10*3/UL (ref 0.8–3)
LYMPHOCYTES NFR BLD AUTO: 33.3 %
MCH RBC QN AUTO: 26.7 PG (ref 26–34)
MCHC RBC AUTO-ENTMCNC: 32.1 G/DL (ref 32–36)
MCV RBC AUTO: 83 FL (ref 80–100)
MONOCYTES # BLD AUTO: 0.24 X10*3/UL (ref 0.05–0.8)
MONOCYTES NFR BLD AUTO: 5.8 %
NEUTROPHILS # BLD AUTO: 2.46 X10*3/UL (ref 1.6–5.5)
NEUTROPHILS NFR BLD AUTO: 60 %
NRBC BLD-RTO: 0 /100 WBCS (ref 0–0)
PLATELET # BLD AUTO: 204 X10*3/UL (ref 150–450)
POTASSIUM SERPL-SCNC: 3.6 MMOL/L (ref 3.5–5.3)
PROT SERPL-MCNC: 6.8 G/DL (ref 6.4–8.2)
PROT SERPL-MCNC: 7.1 G/DL (ref 6.4–8.2)
RBC # BLD AUTO: 4.83 X10*6/UL (ref 4.5–5.9)
SODIUM SERPL-SCNC: 140 MMOL/L (ref 136–145)
WBC # BLD AUTO: 4.1 X10*3/UL (ref 4.4–11.3)

## 2025-04-24 PROCEDURE — 80053 COMPREHEN METABOLIC PANEL: CPT

## 2025-04-24 PROCEDURE — 84155 ASSAY OF PROTEIN SERUM: CPT

## 2025-04-24 PROCEDURE — 82784 ASSAY IGA/IGD/IGG/IGM EACH: CPT

## 2025-04-24 PROCEDURE — 99215 OFFICE O/P EST HI 40 MIN: CPT

## 2025-04-24 PROCEDURE — 1159F MED LIST DOCD IN RCRD: CPT

## 2025-04-24 PROCEDURE — 86334 IMMUNOFIX E-PHORESIS SERUM: CPT

## 2025-04-24 PROCEDURE — 85025 COMPLETE CBC W/AUTO DIFF WBC: CPT

## 2025-04-24 PROCEDURE — 72110 X-RAY EXAM L-2 SPINE 4/>VWS: CPT

## 2025-04-24 PROCEDURE — 3078F DIAST BP <80 MM HG: CPT

## 2025-04-24 PROCEDURE — 1126F AMNT PAIN NOTED NONE PRSNT: CPT

## 2025-04-24 PROCEDURE — 3077F SYST BP >= 140 MM HG: CPT

## 2025-04-24 PROCEDURE — 83521 IG LIGHT CHAINS FREE EACH: CPT

## 2025-04-24 PROCEDURE — 36415 COLL VENOUS BLD VENIPUNCTURE: CPT

## 2025-04-24 RX ORDER — DOCUSATE SODIUM 100 MG/1
100 CAPSULE, LIQUID FILLED ORAL 2 TIMES DAILY
Qty: 60 CAPSULE | Refills: 1 | Status: SHIPPED | OUTPATIENT
Start: 2025-04-24

## 2025-04-24 RX ORDER — AMLODIPINE BESYLATE 10 MG/1
10 TABLET ORAL DAILY
Qty: 30 TABLET | Refills: 2 | Status: SHIPPED | OUTPATIENT
Start: 2025-04-24

## 2025-04-24 ASSESSMENT — PAIN SCALES - GENERAL: PAINLEVEL_OUTOF10: 0-NO PAIN

## 2025-04-25 LAB
KAPPA LC SERPL-MCNC: 1.47 MG/DL (ref 0.33–1.94)
KAPPA LC/LAMBDA SER: 1.06 {RATIO} (ref 0.26–1.65)
LAMBDA LC SERPL-MCNC: 1.39 MG/DL (ref 0.57–2.63)

## 2025-04-29 LAB
ALBUMIN: 4 G/DL (ref 3.4–5)
ALPHA 1 GLOBULIN: 0.3 G/DL (ref 0.2–0.6)
ALPHA 2 GLOBULIN: 0.7 G/DL (ref 0.4–1.1)
BETA GLOBULIN: 0.7 G/DL (ref 0.5–1.2)
GAMMA GLOBULIN: 1.1 G/DL (ref 0.5–1.4)
IMMUNOFIXATION COMMENT: NORMAL
PATH REVIEW - SERUM IMMUNOFIXATION: NORMAL
PATH REVIEW-SERUM PROTEIN ELECTROPHORESIS: NORMAL
PROTEIN ELECTROPHORESIS COMMENT: NORMAL

## 2025-05-15 ENCOUNTER — APPOINTMENT (OUTPATIENT)
Dept: OPHTHALMOLOGY | Facility: CLINIC | Age: 75
End: 2025-05-15
Payer: MEDICARE

## 2025-06-17 DIAGNOSIS — I10 PRIMARY HYPERTENSION: ICD-10-CM

## 2025-06-18 RX ORDER — AMLODIPINE BESYLATE 10 MG/1
10 TABLET ORAL DAILY
Qty: 30 TABLET | Refills: 2 | OUTPATIENT
Start: 2025-06-18

## 2025-06-19 ENCOUNTER — LAB (OUTPATIENT)
Dept: LAB | Facility: HOSPITAL | Age: 75
DRG: 391 | End: 2025-06-19
Payer: MEDICARE

## 2025-06-19 ENCOUNTER — APPOINTMENT (OUTPATIENT)
Dept: RADIOLOGY | Facility: HOSPITAL | Age: 75
DRG: 391 | End: 2025-06-19
Payer: MEDICARE

## 2025-06-19 ENCOUNTER — APPOINTMENT (OUTPATIENT)
Dept: HEMATOLOGY/ONCOLOGY | Facility: HOSPITAL | Age: 75
End: 2025-06-19
Payer: MEDICARE

## 2025-06-19 ENCOUNTER — HOSPITAL ENCOUNTER (INPATIENT)
Facility: HOSPITAL | Age: 75
LOS: 3 days | Discharge: HOME | DRG: 391 | End: 2025-06-22
Attending: EMERGENCY MEDICINE | Admitting: INTERNAL MEDICINE
Payer: MEDICARE

## 2025-06-19 ENCOUNTER — DOCUMENTATION (OUTPATIENT)
Dept: HEMATOLOGY/ONCOLOGY | Facility: HOSPITAL | Age: 75
End: 2025-06-19
Payer: MEDICARE

## 2025-06-19 ENCOUNTER — OFFICE VISIT (OUTPATIENT)
Dept: HEMATOLOGY/ONCOLOGY | Facility: HOSPITAL | Age: 75
End: 2025-06-19
Payer: MEDICARE

## 2025-06-19 VITALS
OXYGEN SATURATION: 93 % | RESPIRATION RATE: 14 BRPM | HEART RATE: 92 BPM | SYSTOLIC BLOOD PRESSURE: 108 MMHG | WEIGHT: 129.4 LBS | DIASTOLIC BLOOD PRESSURE: 74 MMHG | BODY MASS INDEX: 19.25 KG/M2 | TEMPERATURE: 96.1 F

## 2025-06-19 DIAGNOSIS — N17.9 AKI (ACUTE KIDNEY INJURY): Primary | ICD-10-CM

## 2025-06-19 DIAGNOSIS — E87.6 HYPOKALEMIA: ICD-10-CM

## 2025-06-19 DIAGNOSIS — C90.00 MULTIPLE MYELOMA, REMISSION STATUS UNSPECIFIED (MULTI): ICD-10-CM

## 2025-06-19 DIAGNOSIS — R10.30 LOWER ABDOMINAL PAIN: Primary | ICD-10-CM

## 2025-06-19 DIAGNOSIS — D47.2 SMOLDERING MULTIPLE MYELOMA: ICD-10-CM

## 2025-06-19 PROBLEM — R53.1 WEAKNESS: Status: ACTIVE | Noted: 2025-06-19

## 2025-06-19 PROBLEM — R39.89 SUSPECTED UTI: Status: ACTIVE | Noted: 2025-06-19

## 2025-06-19 PROBLEM — R10.9 ABDOMINAL PAIN: Status: ACTIVE | Noted: 2025-06-19

## 2025-06-19 LAB
ALBUMIN SERPL BCP-MCNC: 4.7 G/DL (ref 3.4–5)
ALBUMIN SERPL BCP-MCNC: 4.7 G/DL (ref 3.4–5)
ALP SERPL-CCNC: 74 U/L (ref 33–136)
ALP SERPL-CCNC: 78 U/L (ref 33–136)
ALT SERPL W P-5'-P-CCNC: 6 U/L (ref 10–52)
ALT SERPL W P-5'-P-CCNC: 6 U/L (ref 10–52)
ANION GAP SERPL CALC-SCNC: 14 MMOL/L (ref 10–20)
ANION GAP SERPL CALC-SCNC: 17 MMOL/L (ref 10–20)
APPEARANCE UR: ABNORMAL
AST SERPL W P-5'-P-CCNC: 15 U/L (ref 9–39)
AST SERPL W P-5'-P-CCNC: 25 U/L (ref 9–39)
BASOPHILS # BLD AUTO: 0.01 X10*3/UL (ref 0–0.1)
BASOPHILS # BLD AUTO: 0.02 X10*3/UL (ref 0–0.1)
BASOPHILS NFR BLD AUTO: 0.2 %
BASOPHILS NFR BLD AUTO: 0.4 %
BILIRUB SERPL-MCNC: 0.6 MG/DL (ref 0–1.2)
BILIRUB SERPL-MCNC: 0.6 MG/DL (ref 0–1.2)
BILIRUB UR STRIP.AUTO-MCNC: NEGATIVE MG/DL
BUN SERPL-MCNC: 34 MG/DL (ref 6–23)
BUN SERPL-MCNC: 39 MG/DL (ref 6–23)
CALCIUM SERPL-MCNC: 10 MG/DL (ref 8.6–10.3)
CALCIUM SERPL-MCNC: 9.5 MG/DL (ref 8.6–10.6)
CHLORIDE SERPL-SCNC: 88 MMOL/L (ref 98–107)
CHLORIDE SERPL-SCNC: 91 MMOL/L (ref 98–107)
CO2 SERPL-SCNC: 36 MMOL/L (ref 21–32)
CO2 SERPL-SCNC: 42 MMOL/L (ref 21–32)
COLOR UR: YELLOW
CREAT SERPL-MCNC: 2.89 MG/DL (ref 0.5–1.3)
CREAT SERPL-MCNC: 2.99 MG/DL (ref 0.5–1.3)
EGFRCR SERPLBLD CKD-EPI 2021: 21 ML/MIN/1.73M*2
EGFRCR SERPLBLD CKD-EPI 2021: 22 ML/MIN/1.73M*2
EOSINOPHIL # BLD AUTO: 0.02 X10*3/UL (ref 0–0.4)
EOSINOPHIL # BLD AUTO: 0.02 X10*3/UL (ref 0–0.4)
EOSINOPHIL NFR BLD AUTO: 0.4 %
EOSINOPHIL NFR BLD AUTO: 0.4 %
ERYTHROCYTE [DISTWIDTH] IN BLOOD BY AUTOMATED COUNT: 14.1 % (ref 11.5–14.5)
ERYTHROCYTE [DISTWIDTH] IN BLOOD BY AUTOMATED COUNT: 16.6 % (ref 11.5–14.5)
GLUCOSE SERPL-MCNC: 121 MG/DL (ref 74–99)
GLUCOSE SERPL-MCNC: 134 MG/DL (ref 74–99)
GLUCOSE UR STRIP.AUTO-MCNC: NORMAL MG/DL
HCT VFR BLD AUTO: 45.1 % (ref 41–52)
HCT VFR BLD AUTO: 45.6 % (ref 41–52)
HGB BLD-MCNC: 14.5 G/DL (ref 13.5–17.5)
HGB BLD-MCNC: 14.7 G/DL (ref 13.5–17.5)
HYALINE CASTS #/AREA URNS AUTO: ABNORMAL /LPF
IGA SERPL-MCNC: 162 MG/DL (ref 70–400)
IGG SERPL-MCNC: 1230 MG/DL (ref 700–1600)
IGM SERPL-MCNC: 74 MG/DL (ref 40–230)
IMM GRANULOCYTES # BLD AUTO: 0.01 X10*3/UL (ref 0–0.5)
IMM GRANULOCYTES # BLD AUTO: 0.01 X10*3/UL (ref 0–0.5)
IMM GRANULOCYTES NFR BLD AUTO: 0.2 % (ref 0–0.9)
IMM GRANULOCYTES NFR BLD AUTO: 0.2 % (ref 0–0.9)
KAPPA LC SERPL-MCNC: 2.97 MG/DL (ref 0.33–1.94)
KAPPA LC/LAMBDA SER: 1.51 {RATIO} (ref 0.26–1.65)
KETONES UR STRIP.AUTO-MCNC: ABNORMAL MG/DL
LAMBDA LC SERPL-MCNC: 1.97 MG/DL (ref 0.57–2.63)
LEUKOCYTE ESTERASE UR QL STRIP.AUTO: ABNORMAL
LYMPHOCYTES # BLD AUTO: 1.69 X10*3/UL (ref 0.8–3)
LYMPHOCYTES # BLD AUTO: 2.21 X10*3/UL (ref 0.8–3)
LYMPHOCYTES NFR BLD AUTO: 34.1 %
LYMPHOCYTES NFR BLD AUTO: 40.2 %
MAGNESIUM SERPL-MCNC: 4.62 MG/DL (ref 1.6–2.4)
MCH RBC QN AUTO: 26.7 PG (ref 26–34)
MCH RBC QN AUTO: 26.8 PG (ref 26–34)
MCHC RBC AUTO-ENTMCNC: 32.2 G/DL (ref 32–36)
MCHC RBC AUTO-ENTMCNC: 32.2 G/DL (ref 32–36)
MCV RBC AUTO: 83 FL (ref 80–100)
MCV RBC AUTO: 83 FL (ref 80–100)
MONOCYTES # BLD AUTO: 0.36 X10*3/UL (ref 0.05–0.8)
MONOCYTES # BLD AUTO: 0.45 X10*3/UL (ref 0.05–0.8)
MONOCYTES NFR BLD AUTO: 7.3 %
MONOCYTES NFR BLD AUTO: 8.2 %
MUCOUS THREADS #/AREA URNS AUTO: ABNORMAL /LPF
NEUTROPHILS # BLD AUTO: 2.79 X10*3/UL (ref 1.6–5.5)
NEUTROPHILS # BLD AUTO: 2.87 X10*3/UL (ref 1.6–5.5)
NEUTROPHILS NFR BLD AUTO: 50.6 %
NEUTROPHILS NFR BLD AUTO: 57.8 %
NITRITE UR QL STRIP.AUTO: NEGATIVE
NRBC BLD-RTO: 0 /100 WBCS (ref 0–0)
NRBC BLD-RTO: 0 /100 WBCS (ref 0–0)
PH UR STRIP.AUTO: 5.5 [PH]
PLATELET # BLD AUTO: 161 X10*3/UL (ref 150–450)
PLATELET # BLD AUTO: 232 X10*3/UL (ref 150–450)
POTASSIUM SERPL-SCNC: 2.8 MMOL/L (ref 3.5–5.3)
POTASSIUM SERPL-SCNC: 7 MMOL/L (ref 3.5–5.3)
PROT SERPL-MCNC: 7.7 G/DL (ref 6.4–8.2)
PROT SERPL-MCNC: 7.9 G/DL (ref 6.4–8.2)
PROT SERPL-MCNC: 8.3 G/DL (ref 6.4–8.2)
PROT UR STRIP.AUTO-MCNC: ABNORMAL MG/DL
RBC # BLD AUTO: 5.42 X10*6/UL (ref 4.5–5.9)
RBC # BLD AUTO: 5.51 X10*6/UL (ref 4.5–5.9)
RBC # UR STRIP.AUTO: NEGATIVE MG/DL
RBC #/AREA URNS AUTO: ABNORMAL /HPF
SODIUM SERPL-SCNC: 137 MMOL/L (ref 136–145)
SODIUM SERPL-SCNC: 141 MMOL/L (ref 136–145)
SP GR UR STRIP.AUTO: 1.03
SQUAMOUS #/AREA URNS AUTO: ABNORMAL /HPF
UROBILINOGEN UR STRIP.AUTO-MCNC: ABNORMAL MG/DL
WBC # BLD AUTO: 5 X10*3/UL (ref 4.4–11.3)
WBC # BLD AUTO: 5.5 X10*3/UL (ref 4.4–11.3)
WBC #/AREA URNS AUTO: ABNORMAL /HPF

## 2025-06-19 PROCEDURE — 84155 ASSAY OF PROTEIN SERUM: CPT | Performed by: EMERGENCY MEDICINE

## 2025-06-19 PROCEDURE — 2550000001 HC RX 255 CONTRASTS: Performed by: EMERGENCY MEDICINE

## 2025-06-19 PROCEDURE — 74177 CT ABD & PELVIS W/CONTRAST: CPT

## 2025-06-19 PROCEDURE — 51798 US URINE CAPACITY MEASURE: CPT

## 2025-06-19 PROCEDURE — 87086 URINE CULTURE/COLONY COUNT: CPT

## 2025-06-19 PROCEDURE — 85025 COMPLETE CBC W/AUTO DIFF WBC: CPT

## 2025-06-19 PROCEDURE — 99215 OFFICE O/P EST HI 40 MIN: CPT | Performed by: INTERNAL MEDICINE

## 2025-06-19 PROCEDURE — 96367 TX/PROPH/DG ADDL SEQ IV INF: CPT

## 2025-06-19 PROCEDURE — 80053 COMPREHEN METABOLIC PANEL: CPT

## 2025-06-19 PROCEDURE — 72128 CT CHEST SPINE W/O DYE: CPT

## 2025-06-19 PROCEDURE — 2500000004 HC RX 250 GENERAL PHARMACY W/ HCPCS (ALT 636 FOR OP/ED)

## 2025-06-19 PROCEDURE — 36415 COLL VENOUS BLD VENIPUNCTURE: CPT

## 2025-06-19 PROCEDURE — 99285 EMERGENCY DEPT VISIT HI MDM: CPT | Mod: 25 | Performed by: EMERGENCY MEDICINE

## 2025-06-19 PROCEDURE — 3078F DIAST BP <80 MM HG: CPT | Performed by: INTERNAL MEDICINE

## 2025-06-19 PROCEDURE — 1210000001 HC SEMI-PRIVATE ROOM DAILY

## 2025-06-19 PROCEDURE — 82784 ASSAY IGA/IGD/IGG/IGM EACH: CPT

## 2025-06-19 PROCEDURE — 2500000002 HC RX 250 W HCPCS SELF ADMINISTERED DRUGS (ALT 637 FOR MEDICARE OP, ALT 636 FOR OP/ED)

## 2025-06-19 PROCEDURE — 72125 CT NECK SPINE W/O DYE: CPT

## 2025-06-19 PROCEDURE — 72131 CT LUMBAR SPINE W/O DYE: CPT | Performed by: RADIOLOGY

## 2025-06-19 PROCEDURE — 83735 ASSAY OF MAGNESIUM: CPT | Performed by: EMERGENCY MEDICINE

## 2025-06-19 PROCEDURE — 83521 IG LIGHT CHAINS FREE EACH: CPT

## 2025-06-19 PROCEDURE — 99223 1ST HOSP IP/OBS HIGH 75: CPT

## 2025-06-19 PROCEDURE — 72125 CT NECK SPINE W/O DYE: CPT | Performed by: RADIOLOGY

## 2025-06-19 PROCEDURE — 72128 CT CHEST SPINE W/O DYE: CPT | Performed by: RADIOLOGY

## 2025-06-19 PROCEDURE — 96376 TX/PRO/DX INJ SAME DRUG ADON: CPT

## 2025-06-19 PROCEDURE — 96365 THER/PROPH/DIAG IV INF INIT: CPT

## 2025-06-19 PROCEDURE — 81001 URINALYSIS AUTO W/SCOPE: CPT

## 2025-06-19 PROCEDURE — 96366 THER/PROPH/DIAG IV INF ADDON: CPT

## 2025-06-19 PROCEDURE — 86334 IMMUNOFIX E-PHORESIS SERUM: CPT

## 2025-06-19 PROCEDURE — 3074F SYST BP LT 130 MM HG: CPT | Performed by: INTERNAL MEDICINE

## 2025-06-19 PROCEDURE — 1125F AMNT PAIN NOTED PAIN PRSNT: CPT | Performed by: INTERNAL MEDICINE

## 2025-06-19 PROCEDURE — 74177 CT ABD & PELVIS W/CONTRAST: CPT | Performed by: RADIOLOGY

## 2025-06-19 PROCEDURE — 99285 EMERGENCY DEPT VISIT HI MDM: CPT | Performed by: EMERGENCY MEDICINE

## 2025-06-19 RX ORDER — KETOCONAZOLE 20 MG/G
CREAM TOPICAL DAILY
COMMUNITY

## 2025-06-19 RX ORDER — POTASSIUM CHLORIDE 14.9 MG/ML
20 INJECTION INTRAVENOUS
Status: COMPLETED | OUTPATIENT
Start: 2025-06-19 | End: 2025-06-20

## 2025-06-19 RX ORDER — CEFTRIAXONE 1 G/50ML
1 INJECTION, SOLUTION INTRAVENOUS ONCE
Status: COMPLETED | OUTPATIENT
Start: 2025-06-19 | End: 2025-06-19

## 2025-06-19 RX ORDER — POTASSIUM CHLORIDE 20 MEQ/1
40 TABLET, EXTENDED RELEASE ORAL ONCE
Status: COMPLETED | OUTPATIENT
Start: 2025-06-19 | End: 2025-06-19

## 2025-06-19 RX ADMIN — CEFTRIAXONE SODIUM 1 G: 1 INJECTION, SOLUTION INTRAVENOUS at 21:23

## 2025-06-19 RX ADMIN — IOHEXOL 75 ML: 350 INJECTION, SOLUTION INTRAVENOUS at 20:36

## 2025-06-19 RX ADMIN — POTASSIUM CHLORIDE 40 MEQ: 1500 TABLET, EXTENDED RELEASE ORAL at 17:57

## 2025-06-19 RX ADMIN — POTASSIUM CHLORIDE 20 MEQ: 14.9 INJECTION, SOLUTION INTRAVENOUS at 21:23

## 2025-06-19 RX ADMIN — POTASSIUM CHLORIDE 20 MEQ: 14.9 INJECTION, SOLUTION INTRAVENOUS at 17:58

## 2025-06-19 ASSESSMENT — PAIN - FUNCTIONAL ASSESSMENT: PAIN_FUNCTIONAL_ASSESSMENT: 0-10

## 2025-06-19 ASSESSMENT — PAIN DESCRIPTION - PAIN TYPE: TYPE: ACUTE PAIN

## 2025-06-19 ASSESSMENT — ENCOUNTER SYMPTOMS
HEMATOLOGIC/LYMPHATIC NEGATIVE: 1
CARDIOVASCULAR NEGATIVE: 1
RESPIRATORY NEGATIVE: 1
PSYCHIATRIC NEGATIVE: 1
ENDOCRINE NEGATIVE: 1
EYES NEGATIVE: 1
CONSTIPATION: 1
CONSTITUTIONAL NEGATIVE: 1

## 2025-06-19 ASSESSMENT — LIFESTYLE VARIABLES
EVER HAD A DRINK FIRST THING IN THE MORNING TO STEADY YOUR NERVES TO GET RID OF A HANGOVER: NO
HAVE PEOPLE ANNOYED YOU BY CRITICIZING YOUR DRINKING: NO
HAVE YOU EVER FELT YOU SHOULD CUT DOWN ON YOUR DRINKING: NO
EVER FELT BAD OR GUILTY ABOUT YOUR DRINKING: NO
TOTAL SCORE: 0

## 2025-06-19 ASSESSMENT — PAIN SCALES - GENERAL
PAINLEVEL_OUTOF10: 6
PAINLEVEL_OUTOF10: 6
PAINLEVEL_OUTOF10: 0 - NO PAIN

## 2025-06-19 ASSESSMENT — PAIN DESCRIPTION - LOCATION: LOCATION: ABDOMEN

## 2025-06-19 NOTE — SIGNIFICANT EVENT
Patient has been identified as having an emergent need for administration of iodinated contrast for CT scan prior to result of laboratory studies OR despite known elevated GFR due to possibility of life and/or limb threatening pathology.    I acknowledge the risks and benefits of emergently proceeding with contrast administration including that, at present, it is the position of the [employer] that contrast induced nephropathy (SAPNA) is a rare but possible consequence. At this time the benefits of proceeding with contrast administration outweigh the risks.    Attempts will be made to mitigate possible SAPNA risk with IV fluid hydration if able.    Venessa Cueva MD  Emergency Medicine, PGY-2

## 2025-06-19 NOTE — PROGRESS NOTES
Patient ID: Varinder Lizama is a 75 y.o. male.    Oncology History Overview Note   Smoldering multiple myeloma, ISS stage II:  Characterized by IgG lambda-restricted paraprotein, initially 2.9 g/dL without evidence of end-organ damage. No lytic bone lesions. No anemia. No hypercalcemia. No renal dysfunction. 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 His kappa lambda light chains are low risk. He also has microcytosis likely secondary to alpha-thalassemia  versus iron deficiency.     Symptomatic IgG Lambda Multiple Myeloma ISS stage II   Characterized by anemia noted in 8/2016    Lenalidomide (25 mg daily 21 days on 7 days off) plus Dexamethasone (40 mg once weekly) started on 9/15/2016, stopped briefly due to rash restarted on 12/17/2016    Started maintenance Lenalidomide 10 mg  5/2017 every other day decreasing to 2.5 mg daily 12/2017.    PET/CT (11/7/23):   There is no hypermetabolic activity to suggest active focal  neoplastic process. No increased FDG uptake in the right scapula to  suggest disease involvement.    Continued pancytopenia noted through 2023. Revlimid dose reduced to 2.5mg 3 weeks on, 1 week off in 1/2024.     Held Revlimid as of 2/2024 due to continued cytopenias.    -- NORMOCELLULAR BONE MARROW WITH  MATURING TRILINEAGE HEMATOPOESIS AND GRANULOCYTIC HYPOPLASIA, SEE NOTE  -- NO DEFINITE MORPHOLOGIC OR IMMUNOPHENOTYPIC EVIDENCE OF PLASMA MAYA NEOPLASM     NOTE: Granulopoiesis appears decreased but the granulocytes present show complete maturation. The finding is of unclear etiology. Clinical correlation and correlation with pending genetic/molecular studies suggested.  Decreased iron storage    - Continue to hold revlimid at this time        Multiple myeloma   10/20/2023 Initial Diagnosis    Multiple myeloma (CMS/HCC)         SUBJECTIVE     2/15/24: Revlimid held d/t  cytopenia  3/28/24: No acute complaints at this time. Snow is concerned about his worsening memory loss and needing more help with daily ADL.  6/27/24: Varinder reports feeling well today. He states his energy is good and he is eating well. His friend and caretaker, Snow, feels that he needs more assistance at home with cooking/ bathing/ ADLs. Denies n/v/d/c, weight loss, fever, chill, dizziness, night sweats.   11/14/24: Reports feeling well today. Notes he has difficulty moving around. Feels he is constipated occasionally but has BM approximately every other day. Denies fevers, pain, SOB, swelling, weight loss, night sweats, neuropathy, bruising  1/9/25: reports feeling well today, struggles with constipation but has not been using stool softener. Encouraged to use colace BID. SW present for visit today to discuss referral to [facility] program. Denies fevers, n/v/d/c, rash, lymphadenopathy, new pain, SOB, chest pain, edema.  2/20/25: no c/o. Came with his long time friend.   4/24/25: Varinder presents to clinic today for follow up, accompanied by his friend, Snow. Varinder reports feeling well overall today, but Snow notes that Varinder is walking slower than usual. Varinder endorses that he feels a little weaker than last visit. Denies pain, numbness, falls, new bony pain, unintentional weight loss, fevers, night sweats, chest pain, edema, SOB.   6/19/25: c/o constipation. Poor appetite. Denies any recent f/c/n/v      Review of Systems   Constitutional: Negative.    HENT:  Negative.     Eyes: Negative.    Respiratory: Negative.     Cardiovascular: Negative.    Gastrointestinal:  Positive for constipation.   Endocrine: Negative.    Genitourinary: Negative.     Skin: Negative.    Hematological: Negative.    Psychiatric/Behavioral: Negative.     All other systems reviewed and are negative.        OBJECTIVE   BSA: 1.69 meters squared    /74 (BP Location: Left arm, Patient Position: Sitting, BP Cuff Size: Small adult)   Pulse 92   Temp  35.6 °C (96.1 °F) (Skin)   Resp 14   Wt 58.7 kg (129 lb 6.4 oz)   SpO2 93%   BMI 19.25 kg/m²   Vital signs reviewed in flowsheets.   Physical Exam  Constitutional:       Appearance: He is normal weight.   HENT:      Head: Normocephalic and atraumatic.      Mouth/Throat:      Mouth: Mucous membranes are dry.      Comments: brown plaque on tongue  Eyes:      Extraocular Movements: Extraocular movements intact.      Conjunctiva/sclera: Conjunctivae normal.      Pupils: Pupils are equal, round, and reactive to light.   Cardiovascular:      Pulses: Normal pulses.      Heart sounds: Normal heart sounds.   Pulmonary:      Effort: Pulmonary effort is normal.      Breath sounds: Normal breath sounds.   Abdominal:      General: Abdomen is flat. Bowel sounds are normal.      Palpations: Abdomen is soft.   Musculoskeletal:         General: Normal range of motion.      Cervical back: Normal range of motion and neck supple.   Skin:     General: Skin is warm and dry.   Neurological:      Mental Status: He is alert. Mental status is at baseline.       Performance Status:  Karnofsky Score: 60 - Requires occasional assistance, but is able care for most of personal needs    Past Medical History:   Diagnosis Date    Age-related nuclear cataract, bilateral 11/23/2016    Age-related nuclear cataract of both eyes    Angioneurotic edema, initial encounter 04/07/2015    ACE inhibitor-aggravated angioedema    Cortical age-related cataract, bilateral 11/23/2016    Cortical age-related cataract of both eyes    Food insecurity 05/19/2022    Food insecurity    Keratoconjunctivitis due to adenovirus 08/04/2016    EKC (epidemic keratoconjunctivitis)    Posterior subcapsular polar age-related cataract, right eye 10/10/2016    Posterior subcapsular polar age-related cataract of right eye    Viral conjunctivitis, unspecified 10/10/2016    Acute viral conjunctivitis of both eyes     Past Surgical History:   Procedure Laterality Date    OTHER  SURGICAL HISTORY  07/28/2017    Prior Surgical Procedure Not Done       Family History   Problem Relation Name Age of Onset    Heart attack Mother      Heart attack Father             MONITORING     MARKERS RECENT LAB VALUES   Free Kappa Light Chains Lab Results   Component Value Date    KAPPA 2.97 (H) 06/19/2025    KAPPA 1.47 04/24/2025    KAPPA 1.50 02/20/2025      Free Lambda Light Chains Lab Results   Component Value Date    LAMBDA 1.97 06/19/2025    LAMBDA 1.39 04/24/2025    LAMBDA 1.48 02/20/2025      Free Light Chain Ratio Lab Results   Component Value Date    KAPLS 1.51 06/19/2025    KAPLS 1.06 04/24/2025    KAPLS 1.01 02/20/2025       Immunofixation Lab Results   Component Value Date    IEPIN No monoclonal protein detected by immunofixation. 04/24/2025    IEPIN No monoclonal protein detected by immunofixation. 02/20/2025    IEPIN No monoclonal protein detected by immunofixation. 01/09/2025       IgG Lab Results   Component Value Date    IGG 1,120 04/24/2025    IGG 1,180 02/20/2025    IGG 1,210 01/09/2025      IgA Lab Results   Component Value Date     04/24/2025     02/20/2025     01/09/2025      IgM Lab Results   Component Value Date    IGM 69 04/24/2025    IGM 70 02/20/2025    IGM 77 01/09/2025        Other Labs  Lab Results   Component Value Date    WBC 5.0 06/19/2025    NRBC 0.0 06/19/2025    RBC 5.51 06/19/2025    HGB 14.7 06/19/2025    HCT 45.6 06/19/2025    MCV 83 06/19/2025    MCH 26.7 06/19/2025    MCHC 32.2 06/19/2025    RDW 14.1 06/19/2025     06/19/2025    IGPCT 0.2 06/19/2025    LYMPHOPCT 34.1 06/19/2025    MONOPCT 7.3 06/19/2025    EOSPCT 0.4 06/19/2025    BASOPCT 0.2 06/19/2025    NEUTROABS 2.87 06/19/2025    MONOSABS 0.36 06/19/2025    EOSABS 0.02 06/19/2025       Lab Results   Component Value Date    GLUCOSE 134 (H) 06/19/2025     06/19/2025    K 2.8 (LL) 06/19/2025    CL 88 (L) 06/19/2025    CO2 42 (HH) 06/19/2025    ANIONGAP 14 06/19/2025    BUN 34 (H)  06/19/2025    CREATININE 2.89 (H) 06/19/2025    EGFR 22 (L) 06/19/2025    CALCIUM 10.0 06/19/2025    ALBUMIN 4.7 06/19/2025    ALKPHOS 78 06/19/2025    PROT 7.9 06/19/2025    PROT 7.7 06/19/2025    AST 15 06/19/2025    BILITOT 0.6 06/19/2025    ALT 6 (L) 06/19/2025       Lab Results   Component Value Date     (H) 03/28/2024       ASSESSMENT & PLAN   Multiple Myeloma, IgG lambda, initially diagnosed in 7/2012  - Smoldering MM-> progressed in 8/2016 R-ISS stage II  - started on Rd on 9/15/16-> dose reduction>>> 2.5 mg currently  - Last bone survey in 5/2021: Small lesion of the right infra glenoid scapula unchanged from multiple prior studies. No new osseous lesions identified.  - Recent SPEP/IF: no measurable paraprotein  - Last UPEP/IF in 5/2016-> 182.4 mg/L IgG lambda  - Zometa last 11/18/21, no plans to resume at this time  - cont to monitor Myeloma panel every 2 months.  - Negative PET/CT 11/7/23  - Revlimid decreased to 3 weeks on 1 week off 1/2024  - discontinue Revlimid as of 2/15/24 due to cytopenia  - recent BMBx 2/22/24 - no morphologic evidence of plasma cell neoplasm.  - continue monitor q 2-3 months    Progressive Weakness Bilateral Lower Extremities  - xray lumbar spine ordered      Pancytopenia  - persistent after Revlimid was held  - BMBx from 2/22/24 as above -genetic/molecular study unremarkable.    CKD/GODFREY  - Cr 1.01>2.89  - K 2.8  - advise ED evaluation    Hypokalemia:  - Taking 20meq K TID  - K 3.1, pt. Stated he was only taking 20meq BID, encouraged TID dosing  - discontinued HCTZ  - advised to see PCP for HTN meds. 1X refill today    Constipation  - colace sent   - on MOM prn       RTC: 2months       Ok-Mignon Cutler MD

## 2025-06-19 NOTE — ED TRIAGE NOTES
Abdominal Pain/Constipation state that his last BM was in this AM. Pt was send to us from his Appointment had blood work done and Potasium was 2.8

## 2025-06-19 NOTE — ED PROVIDER NOTES
Emergency Department Provider Note        History of Present Illness     History provided by: Patient  Limitations to History: None    HPI:  Patient is a 75-year-old male with a past medical history multiple myeloma presented emergency department for hypokalemia. Patient states that he was at his oncologist office today for lab work was concerning for hypokalemia. Patient states he is having difficulty with bowel movements states that he had abdominal pain when straining today. Patient states the past month has had urinary incontinence. Patient also states that he is having difficulty walking for the past 2 months.   Physical Exam   Triage vitals:  T 36.1 °C (96.9 °F)  HR 82  /77  RR 16  O2 96 % None (Room air)    Physical Exam  Constitutional:       Appearance: Normal appearance.   HENT:      Head: Normocephalic.   Eyes:      Extraocular Movements: Extraocular movements intact.   Cardiovascular:      Rate and Rhythm: Normal rate.   Pulmonary:      Effort: Pulmonary effort is normal.   Abdominal:      General: Abdomen is flat.      Palpations: Abdomen is soft.      Tenderness: There is no abdominal tenderness.   Musculoskeletal:         General: Normal range of motion.      Cervical back: Normal range of motion.   Skin:     General: Skin is warm.   Neurological:      Mental Status: He is alert. Mental status is at baseline.      Motor: No weakness (Bilateral lower extremity 3/5).      Comments: No decreased sensation of bilateral lower extremity   Psychiatric:         Mood and Affect: Mood normal.         Behavior: Behavior normal.          Medical Decision Making & ED Course   Medical Decision Making:    Patient was in emergency department for hyperkalemia. Patient was sent from his oncologist office due to hyperkalemia. States that the office was 2.8. Will repeat CMP as well as magnesium. Patient also complaining of abdominal pain with straining states he was having difficulty having a bowel movement his  last bowel movement was yesterday morning. Patient having difficulty ambulating and is having urinary continence for the past month. Concern for metastasis and cauda equina will obtain CT imaging of abdomen and spine. Will obtain a urinalysis due to concern for infection. Patient abdomen was soft and nontender however will scan abdomen due to pain and cancer history. Please see to course further details         EKG Independent Interpretation: EKG not obtained        The patient was discussed with the following consultants/services: [facility]      ED Course as of 06/20/25 2112   Thu Jun 19, 2025 2049 Patient is seen 7 will repeat potassium, urinalysis concerning for UTI will treat with Rocephin.  Will obtain a bladder scan to make sure not overflow incontinence. [TS]   2210 Bladder scan showed a urine of 42, patient was admitted to the malignant heme service for UTI, GODFREY, hypokalemia [TS]      ED Course User Index  [TS] Venessa Cueva MD         Diagnoses as of 06/20/25 2112   Lower abdominal pain   Hypokalemia          Disposition   As a result of their workup, the patient will require admission to the hospital.  The patient was informed of his diagnosis.  The patient was given the opportunity to ask questions and I answered them. The patient agreed to be admitted to the hospital.    Procedures   Procedures    Patient seen and discussed with ED attending physician.    Venessa Cueva MD  Emergency Medicine     Venessa Cueva MD  Resident  06/20/25 2113

## 2025-06-20 ENCOUNTER — APPOINTMENT (OUTPATIENT)
Dept: HEMATOLOGY/ONCOLOGY | Facility: HOSPITAL | Age: 75
DRG: 391 | End: 2025-06-20
Payer: MEDICARE

## 2025-06-20 DIAGNOSIS — K59.00 CONSTIPATION, UNSPECIFIED CONSTIPATION TYPE: ICD-10-CM

## 2025-06-20 LAB
ABO GROUP (TYPE) IN BLOOD: NORMAL
ALBUMIN SERPL BCP-MCNC: 4.3 G/DL (ref 3.4–5)
ANION GAP SERPL CALC-SCNC: 13 MMOL/L (ref 10–20)
ANTIBODY SCREEN: NORMAL
BACTERIA UR CULT: NORMAL
BASOPHILS # BLD AUTO: 0.01 X10*3/UL (ref 0–0.1)
BASOPHILS NFR BLD AUTO: 0.2 %
BUN SERPL-MCNC: 40 MG/DL (ref 6–23)
CALCIUM SERPL-MCNC: 9 MG/DL (ref 8.6–10.6)
CHLORIDE SERPL-SCNC: 94 MMOL/L (ref 98–107)
CHLORIDE UR-SCNC: <15 MMOL/L
CHLORIDE/CREATININE (MMOL/G) IN URINE: NORMAL
CO2 SERPL-SCNC: 38 MMOL/L (ref 21–32)
CORTIS AM PEAK SERPL-MSCNC: 26.9 UG/DL (ref 5–20)
CREAT SERPL-MCNC: 2.63 MG/DL (ref 0.5–1.3)
CREAT UR-MCNC: 260.4 MG/DL (ref 20–370)
EGFRCR SERPLBLD CKD-EPI 2021: 25 ML/MIN/1.73M*2
EOSINOPHIL # BLD AUTO: 0.04 X10*3/UL (ref 0–0.4)
EOSINOPHIL NFR BLD AUTO: 0.7 %
ERYTHROCYTE [DISTWIDTH] IN BLOOD BY AUTOMATED COUNT: 14.3 % (ref 11.5–14.5)
GLUCOSE SERPL-MCNC: 108 MG/DL (ref 74–99)
HCT VFR BLD AUTO: 41.2 % (ref 41–52)
HGB BLD-MCNC: 12.7 G/DL (ref 13.5–17.5)
HOLD SPECIMEN: 293
IMM GRANULOCYTES # BLD AUTO: 0.02 X10*3/UL (ref 0–0.5)
IMM GRANULOCYTES NFR BLD AUTO: 0.4 % (ref 0–0.9)
LYMPHOCYTES # BLD AUTO: 1.77 X10*3/UL (ref 0.8–3)
LYMPHOCYTES NFR BLD AUTO: 31.3 %
MAGNESIUM SERPL-MCNC: 4.31 MG/DL (ref 1.6–2.4)
MAGNESIUM SERPL-MCNC: 4.51 MG/DL (ref 1.6–2.4)
MCH RBC QN AUTO: 26.1 PG (ref 26–34)
MCHC RBC AUTO-ENTMCNC: 30.8 G/DL (ref 32–36)
MCV RBC AUTO: 85 FL (ref 80–100)
MONOCYTES # BLD AUTO: 0.44 X10*3/UL (ref 0.05–0.8)
MONOCYTES NFR BLD AUTO: 7.8 %
NEUTROPHILS # BLD AUTO: 3.37 X10*3/UL (ref 1.6–5.5)
NEUTROPHILS NFR BLD AUTO: 59.6 %
NRBC BLD-RTO: 0 /100 WBCS (ref 0–0)
PHOSPHATE SERPL-MCNC: 3.5 MG/DL (ref 2.5–4.9)
PLATELET # BLD AUTO: 205 X10*3/UL (ref 150–450)
POTASSIUM SERPL-SCNC: 3.2 MMOL/L (ref 3.5–5.3)
POTASSIUM SERPL-SCNC: 4 MMOL/L (ref 3.5–5.3)
POTASSIUM UR-SCNC: 20 MMOL/L
POTASSIUM/CREAT UR-RTO: 8 MMOL/G CREAT
RBC # BLD AUTO: 4.87 X10*6/UL (ref 4.5–5.9)
RH FACTOR (ANTIGEN D): NORMAL
SODIUM SERPL-SCNC: 142 MMOL/L (ref 136–145)
SODIUM UR-SCNC: <10 MMOL/L
SODIUM/CREAT UR-RTO: NORMAL
T4 FREE SERPL-MCNC: 1.45 NG/DL (ref 0.78–1.48)
TSH SERPL-ACNC: 1.89 MIU/L (ref 0.44–3.98)
WBC # BLD AUTO: 5.7 X10*3/UL (ref 4.4–11.3)

## 2025-06-20 PROCEDURE — 86900 BLOOD TYPING SEROLOGIC ABO: CPT

## 2025-06-20 PROCEDURE — 83735 ASSAY OF MAGNESIUM: CPT

## 2025-06-20 PROCEDURE — 99233 SBSQ HOSP IP/OBS HIGH 50: CPT | Performed by: INTERNAL MEDICINE

## 2025-06-20 PROCEDURE — 36415 COLL VENOUS BLD VENIPUNCTURE: CPT

## 2025-06-20 PROCEDURE — 2500000004 HC RX 250 GENERAL PHARMACY W/ HCPCS (ALT 636 FOR OP/ED)

## 2025-06-20 PROCEDURE — 93010 ELECTROCARDIOGRAM REPORT: CPT | Performed by: INTERNAL MEDICINE

## 2025-06-20 PROCEDURE — 93005 ELECTROCARDIOGRAM TRACING: CPT

## 2025-06-20 PROCEDURE — 2500000002 HC RX 250 W HCPCS SELF ADMINISTERED DRUGS (ALT 637 FOR MEDICARE OP, ALT 636 FOR OP/ED)

## 2025-06-20 PROCEDURE — 84443 ASSAY THYROID STIM HORMONE: CPT | Performed by: NURSE PRACTITIONER

## 2025-06-20 PROCEDURE — 2500000001 HC RX 250 WO HCPCS SELF ADMINISTERED DRUGS (ALT 637 FOR MEDICARE OP)

## 2025-06-20 PROCEDURE — 80069 RENAL FUNCTION PANEL: CPT

## 2025-06-20 PROCEDURE — 85025 COMPLETE CBC W/AUTO DIFF WBC: CPT

## 2025-06-20 PROCEDURE — 2500000004 HC RX 250 GENERAL PHARMACY W/ HCPCS (ALT 636 FOR OP/ED): Performed by: NURSE PRACTITIONER

## 2025-06-20 PROCEDURE — 82533 TOTAL CORTISOL: CPT | Performed by: NURSE PRACTITIONER

## 2025-06-20 PROCEDURE — 84439 ASSAY OF FREE THYROXINE: CPT | Performed by: NURSE PRACTITIONER

## 2025-06-20 PROCEDURE — 84133 ASSAY OF URINE POTASSIUM: CPT | Performed by: NURSE PRACTITIONER

## 2025-06-20 PROCEDURE — 1170000001 HC PRIVATE ONCOLOGY ROOM DAILY

## 2025-06-20 RX ORDER — DAPSONE 100 MG/1
1 TABLET ORAL DAILY
Status: DISCONTINUED | OUTPATIENT
Start: 2025-06-20 | End: 2025-06-22 | Stop reason: HOSPADM

## 2025-06-20 RX ORDER — AMLODIPINE BESYLATE 10 MG/1
10 TABLET ORAL DAILY
Status: DISCONTINUED | OUTPATIENT
Start: 2025-06-20 | End: 2025-06-22 | Stop reason: HOSPADM

## 2025-06-20 RX ORDER — TRIHEXYPHENIDYL HYDROCHLORIDE 2 MG/1
1 TABLET ORAL NIGHTLY
Status: DISCONTINUED | OUTPATIENT
Start: 2025-06-20 | End: 2025-06-22 | Stop reason: HOSPADM

## 2025-06-20 RX ORDER — BISACODYL 5 MG
10 TABLET, DELAYED RELEASE (ENTERIC COATED) ORAL DAILY PRN
Status: DISCONTINUED | OUTPATIENT
Start: 2025-06-20 | End: 2025-06-22 | Stop reason: HOSPADM

## 2025-06-20 RX ORDER — DOCUSATE SODIUM 100 MG/1
100 CAPSULE, LIQUID FILLED ORAL 2 TIMES DAILY
Status: DISCONTINUED | OUTPATIENT
Start: 2025-06-20 | End: 2025-06-22 | Stop reason: HOSPADM

## 2025-06-20 RX ORDER — DOCUSATE SODIUM 100 MG/1
100 CAPSULE, LIQUID FILLED ORAL 2 TIMES DAILY
Qty: 60 CAPSULE | Refills: 1 | OUTPATIENT
Start: 2025-06-20

## 2025-06-20 RX ORDER — CIPROFLOXACIN 2 MG/ML
200 INJECTION, SOLUTION INTRAVENOUS EVERY 12 HOURS
Status: DISCONTINUED | OUTPATIENT
Start: 2025-06-20 | End: 2025-06-21

## 2025-06-20 RX ORDER — ONDANSETRON HYDROCHLORIDE 2 MG/ML
4 INJECTION, SOLUTION INTRAVENOUS EVERY 8 HOURS PRN
Status: DISCONTINUED | OUTPATIENT
Start: 2025-06-20 | End: 2025-06-22 | Stop reason: HOSPADM

## 2025-06-20 RX ORDER — OLANZAPINE 5 MG/1
20 TABLET, FILM COATED ORAL NIGHTLY
Status: DISCONTINUED | OUTPATIENT
Start: 2025-06-20 | End: 2025-06-22 | Stop reason: HOSPADM

## 2025-06-20 RX ORDER — POTASSIUM CHLORIDE 14.9 MG/ML
20 INJECTION INTRAVENOUS
Status: COMPLETED | OUTPATIENT
Start: 2025-06-20 | End: 2025-06-21

## 2025-06-20 RX ORDER — BENZTROPINE MESYLATE 1 MG/1
1 TABLET ORAL DAILY
Status: DISCONTINUED | OUTPATIENT
Start: 2025-06-20 | End: 2025-06-22 | Stop reason: HOSPADM

## 2025-06-20 RX ORDER — CEFTRIAXONE 1 G/50ML
1 INJECTION, SOLUTION INTRAVENOUS EVERY 24 HOURS
Status: DISCONTINUED | OUTPATIENT
Start: 2025-06-20 | End: 2025-06-20

## 2025-06-20 RX ORDER — METRONIDAZOLE 500 MG/100ML
500 INJECTION, SOLUTION INTRAVENOUS EVERY 8 HOURS
Status: DISCONTINUED | OUTPATIENT
Start: 2025-06-20 | End: 2025-06-21

## 2025-06-20 RX ORDER — ONDANSETRON 4 MG/1
4 TABLET, ORALLY DISINTEGRATING ORAL EVERY 8 HOURS PRN
Status: DISCONTINUED | OUTPATIENT
Start: 2025-06-20 | End: 2025-06-22 | Stop reason: HOSPADM

## 2025-06-20 RX ORDER — HEPARIN SODIUM 5000 [USP'U]/ML
5000 INJECTION, SOLUTION INTRAVENOUS; SUBCUTANEOUS EVERY 8 HOURS
Status: DISCONTINUED | OUTPATIENT
Start: 2025-06-20 | End: 2025-06-22 | Stop reason: HOSPADM

## 2025-06-20 RX ORDER — SODIUM CHLORIDE, SODIUM LACTATE, POTASSIUM CHLORIDE, CALCIUM CHLORIDE 600; 310; 30; 20 MG/100ML; MG/100ML; MG/100ML; MG/100ML
75 INJECTION, SOLUTION INTRAVENOUS CONTINUOUS
Status: ACTIVE | OUTPATIENT
Start: 2025-06-20 | End: 2025-06-21

## 2025-06-20 RX ORDER — FAMOTIDINE 40 MG/1
40 TABLET, FILM COATED ORAL NIGHTLY
Status: DISCONTINUED | OUTPATIENT
Start: 2025-06-20 | End: 2025-06-22 | Stop reason: HOSPADM

## 2025-06-20 RX ADMIN — BENZTROPINE MESYLATE 1 MG: 1 TABLET ORAL at 10:12

## 2025-06-20 RX ADMIN — SODIUM CHLORIDE, SODIUM LACTATE, POTASSIUM CHLORIDE, AND CALCIUM CHLORIDE 75 ML/HR: .6; .31; .03; .02 INJECTION, SOLUTION INTRAVENOUS at 00:47

## 2025-06-20 RX ADMIN — DOCUSATE SODIUM 100 MG: 100 CAPSULE, LIQUID FILLED ORAL at 10:12

## 2025-06-20 RX ADMIN — FAMOTIDINE 40 MG: 40 TABLET ORAL at 21:05

## 2025-06-20 RX ADMIN — CIPROFLOXACIN 200 MG: 200 INJECTION, SOLUTION INTRAVENOUS at 14:37

## 2025-06-20 RX ADMIN — METRONIDAZOLE 500 MG: 5 INJECTION, SOLUTION INTRAVENOUS at 21:07

## 2025-06-20 RX ADMIN — SODIUM CHLORIDE, SODIUM LACTATE, POTASSIUM CHLORIDE, AND CALCIUM CHLORIDE 75 ML/HR: .6; .31; .03; .02 INJECTION, SOLUTION INTRAVENOUS at 17:48

## 2025-06-20 RX ADMIN — TRIHEXYPHENIDYL HYDROCHLORIDE 1 MG: 2 TABLET ORAL at 20:46

## 2025-06-20 RX ADMIN — TRIHEXYPHENIDYL HYDROCHLORIDE 1 MG: 2 TABLET ORAL at 05:22

## 2025-06-20 RX ADMIN — POTASSIUM CHLORIDE 20 MEQ: 14.9 INJECTION, SOLUTION INTRAVENOUS at 17:48

## 2025-06-20 RX ADMIN — POTASSIUM CHLORIDE 20 MEQ: 14.9 INJECTION, SOLUTION INTRAVENOUS at 22:49

## 2025-06-20 RX ADMIN — AMLODIPINE BESYLATE 10 MG: 10 TABLET ORAL at 10:12

## 2025-06-20 RX ADMIN — OLANZAPINE 20 MG: 5 TABLET, FILM COATED ORAL at 20:47

## 2025-06-20 RX ADMIN — CIPROFLOXACIN 200 MG: 200 INJECTION, SOLUTION INTRAVENOUS at 23:38

## 2025-06-20 RX ADMIN — FAMOTIDINE 40 MG: 40 TABLET ORAL at 00:55

## 2025-06-20 RX ADMIN — METRONIDAZOLE 500 MG: 5 INJECTION, SOLUTION INTRAVENOUS at 12:32

## 2025-06-20 RX ADMIN — DAPSONE 100 MG: 100 TABLET ORAL at 10:12

## 2025-06-20 RX ADMIN — DOCUSATE SODIUM 100 MG: 100 CAPSULE, LIQUID FILLED ORAL at 20:47

## 2025-06-20 RX ADMIN — OLANZAPINE 20 MG: 5 TABLET, FILM COATED ORAL at 00:55

## 2025-06-20 SDOH — SOCIAL STABILITY: SOCIAL INSECURITY
WITHIN THE LAST YEAR, HAVE YOU BEEN KICKED, HIT, SLAPPED, OR OTHERWISE PHYSICALLY HURT BY YOUR PARTNER OR EX-PARTNER?: NO

## 2025-06-20 SDOH — ECONOMIC STABILITY: TRANSPORTATION INSECURITY: IN THE PAST 12 MONTHS, HAS LACK OF TRANSPORTATION KEPT YOU FROM MEDICAL APPOINTMENTS OR FROM GETTING MEDICATIONS?: NO

## 2025-06-20 SDOH — ECONOMIC STABILITY: INCOME INSECURITY: IN THE PAST 12 MONTHS HAS THE ELECTRIC, GAS, OIL, OR WATER COMPANY THREATENED TO SHUT OFF SERVICES IN YOUR HOME?: NO

## 2025-06-20 SDOH — ECONOMIC STABILITY: FOOD INSECURITY: WITHIN THE PAST 12 MONTHS, YOU WORRIED THAT YOUR FOOD WOULD RUN OUT BEFORE YOU GOT THE MONEY TO BUY MORE.: NEVER TRUE

## 2025-06-20 SDOH — ECONOMIC STABILITY: HOUSING INSECURITY: AT ANY TIME IN THE PAST 12 MONTHS, WERE YOU HOMELESS OR LIVING IN A SHELTER (INCLUDING NOW)?: NO

## 2025-06-20 SDOH — SOCIAL STABILITY: SOCIAL INSECURITY: HAVE YOU HAD ANY THOUGHTS OF HARMING ANYONE ELSE?: NO

## 2025-06-20 SDOH — HEALTH STABILITY: PHYSICAL HEALTH: ON AVERAGE, HOW MANY DAYS PER WEEK DO YOU ENGAGE IN MODERATE TO STRENUOUS EXERCISE (LIKE A BRISK WALK)?: 0 DAYS

## 2025-06-20 SDOH — SOCIAL STABILITY: SOCIAL INSECURITY: ABUSE: ADULT

## 2025-06-20 SDOH — ECONOMIC STABILITY: HOUSING INSECURITY: IN THE LAST 12 MONTHS, WAS THERE A TIME WHEN YOU WERE NOT ABLE TO PAY THE MORTGAGE OR RENT ON TIME?: NO

## 2025-06-20 SDOH — ECONOMIC STABILITY: FOOD INSECURITY: WITHIN THE PAST 12 MONTHS, THE FOOD YOU BOUGHT JUST DIDN'T LAST AND YOU DIDN'T HAVE MONEY TO GET MORE.: NEVER TRUE

## 2025-06-20 SDOH — SOCIAL STABILITY: SOCIAL INSECURITY: WITHIN THE LAST YEAR, HAVE YOU BEEN AFRAID OF YOUR PARTNER OR EX-PARTNER?: NO

## 2025-06-20 SDOH — HEALTH STABILITY: PHYSICAL HEALTH: ON AVERAGE, HOW MANY MINUTES DO YOU ENGAGE IN EXERCISE AT THIS LEVEL?: 0 MIN

## 2025-06-20 SDOH — SOCIAL STABILITY: SOCIAL INSECURITY: WITHIN THE LAST YEAR, HAVE YOU BEEN HUMILIATED OR EMOTIONALLY ABUSED IN OTHER WAYS BY YOUR PARTNER OR EX-PARTNER?: NO

## 2025-06-20 SDOH — SOCIAL STABILITY: SOCIAL INSECURITY
WITHIN THE LAST YEAR, HAVE YOU BEEN RAPED OR FORCED TO HAVE ANY KIND OF SEXUAL ACTIVITY BY YOUR PARTNER OR EX-PARTNER?: NO

## 2025-06-20 SDOH — ECONOMIC STABILITY: FOOD INSECURITY: HOW HARD IS IT FOR YOU TO PAY FOR THE VERY BASICS LIKE FOOD, HOUSING, MEDICAL CARE, AND HEATING?: SOMEWHAT HARD

## 2025-06-20 SDOH — SOCIAL STABILITY: SOCIAL INSECURITY: HAVE YOU HAD THOUGHTS OF HARMING ANYONE ELSE?: NO

## 2025-06-20 SDOH — SOCIAL STABILITY: SOCIAL INSECURITY: DO YOU FEEL ANYONE HAS EXPLOITED OR TAKEN ADVANTAGE OF YOU FINANCIALLY OR OF YOUR PERSONAL PROPERTY?: NO

## 2025-06-20 SDOH — SOCIAL STABILITY: SOCIAL INSECURITY: HAS ANYONE EVER THREATENED TO HURT YOUR FAMILY OR YOUR PETS?: NO

## 2025-06-20 SDOH — SOCIAL STABILITY: SOCIAL INSECURITY: DO YOU FEEL UNSAFE GOING BACK TO THE PLACE WHERE YOU ARE LIVING?: NO

## 2025-06-20 SDOH — SOCIAL STABILITY: SOCIAL INSECURITY: ARE YOU OR HAVE YOU BEEN THREATENED OR ABUSED PHYSICALLY, EMOTIONALLY, OR SEXUALLY BY ANYONE?: NO

## 2025-06-20 SDOH — SOCIAL STABILITY: SOCIAL INSECURITY: WERE YOU ABLE TO COMPLETE ALL THE BEHAVIORAL HEALTH SCREENINGS?: YES

## 2025-06-20 SDOH — SOCIAL STABILITY: SOCIAL INSECURITY: DOES ANYONE TRY TO KEEP YOU FROM HAVING/CONTACTING OTHER FRIENDS OR DOING THINGS OUTSIDE YOUR HOME?: NO

## 2025-06-20 SDOH — SOCIAL STABILITY: SOCIAL INSECURITY: ARE THERE ANY APPARENT SIGNS OF INJURIES/BEHAVIORS THAT COULD BE RELATED TO ABUSE/NEGLECT?: NO

## 2025-06-20 ASSESSMENT — ENCOUNTER SYMPTOMS
EYES NEGATIVE: 1
CHILLS: 0
DIZZINESS: 0
SORE THROAT: 0
COUGH: 0
TROUBLE SWALLOWING: 0
DYSURIA: 0
CONSTIPATION: 0
ABDOMINAL PAIN: 0
VOMITING: 0
FREQUENCY: 0
HALLUCINATIONS: 0
CHEST TIGHTNESS: 0
FEVER: 0
LIGHT-HEADEDNESS: 0
DECREASED CONCENTRATION: 0
DIARRHEA: 0
BACK PAIN: 0
SHORTNESS OF BREATH: 0
APPETITE CHANGE: 0
CONFUSION: 0
WEAKNESS: 1

## 2025-06-20 ASSESSMENT — COGNITIVE AND FUNCTIONAL STATUS - GENERAL
MOBILITY SCORE: 24
PATIENT BASELINE BEDBOUND: NO
DAILY ACTIVITIY SCORE: 24
DAILY ACTIVITIY SCORE: 24
MOBILITY SCORE: 24

## 2025-06-20 ASSESSMENT — LIFESTYLE VARIABLES
AUDIT-C TOTAL SCORE: 0
PRESCIPTION_ABUSE_PAST_12_MONTHS: NO
SUBSTANCE_ABUSE_PAST_12_MONTHS: NO
HOW OFTEN DO YOU HAVE A DRINK CONTAINING ALCOHOL: NEVER
AUDIT-C TOTAL SCORE: 0
HOW OFTEN DO YOU HAVE 6 OR MORE DRINKS ON ONE OCCASION: NEVER
SKIP TO QUESTIONS 9-10: 1
HOW MANY STANDARD DRINKS CONTAINING ALCOHOL DO YOU HAVE ON A TYPICAL DAY: PATIENT DOES NOT DRINK

## 2025-06-20 ASSESSMENT — ACTIVITIES OF DAILY LIVING (ADL)
DRESSING YOURSELF: INDEPENDENT
LACK_OF_TRANSPORTATION: NO
JUDGMENT_ADEQUATE_SAFELY_COMPLETE_DAILY_ACTIVITIES: YES
LACK_OF_TRANSPORTATION: NO
BATHING: INDEPENDENT
LACK_OF_TRANSPORTATION: NO
HEARING - RIGHT EAR: DIFFICULTY WITH NOISE
ADEQUATE_TO_COMPLETE_ADL: YES
WALKS IN HOME: INDEPENDENT
TOILETING: INDEPENDENT
GROOMING: INDEPENDENT
PATIENT'S MEMORY ADEQUATE TO SAFELY COMPLETE DAILY ACTIVITIES?: YES
HEARING - LEFT EAR: DIFFICULTY WITH NOISE
FEEDING YOURSELF: INDEPENDENT

## 2025-06-20 ASSESSMENT — PATIENT HEALTH QUESTIONNAIRE - PHQ9
2. FEELING DOWN, DEPRESSED OR HOPELESS: NOT AT ALL
SUM OF ALL RESPONSES TO PHQ9 QUESTIONS 1 & 2: 0
1. LITTLE INTEREST OR PLEASURE IN DOING THINGS: NOT AT ALL

## 2025-06-20 ASSESSMENT — PAIN - FUNCTIONAL ASSESSMENT: PAIN_FUNCTIONAL_ASSESSMENT: 0-10

## 2025-06-20 ASSESSMENT — PAIN SCALES - GENERAL: PAINLEVEL_OUTOF10: 0 - NO PAIN

## 2025-06-20 NOTE — H&P
"History Of Present Illness  Varinder Lizama is a 75 y.o. male presenting from the [facility] ED from his Oncologist's office for hypokalemia, GODFREY, abdominal pain and weakness.  Mr. Lizama states that the mid-abdominal pain started yesterday afternoon after eating.  He describes it as feeling like a \"punch to the stomach\".  He denies associated nausea, vomiting, diarrhea or constipation.  He states that the pain resolved with the potassium infusions today.      ED Course:  :: K+ 2.8- Patient received K+ 40 PO, K+ 20 IV.  Repeat K+ 7.0 (c/f hemolysis).  Awaiting repeat level.  :: UA + for LE and WBC- Patient received Ceftriaxone 1 GM IV.  Awaiting reflex culture.  :: Cr 2.89- Patient baseline Cr 1.00.  Patient reports decreased PO liquid intake.  :: CT abdomen- Nonspecific mild prominent fluid-filled loops of distal small bowel and colon which can be seen in the setting of enterocolitis.  No evidence of acute process within the abdomen or pelvis.  Additional chronic and incidental findings include scattered low-density cystic lesions of the bilateral kidneys which are indeterminate however favored to reflect benign renal cysts.  :: CT spine- No evidence of focal destructive osseous lesion to suggest metastatic disease through the visualized structures of the cervical, thoracic, or lumbar spine.  Varying degrees of degenerative changes of the spine.  No critical canal stenosis.    Past Medical History  He has a past medical history of Age-related nuclear cataract, bilateral (11/23/2016), Angioneurotic edema, initial encounter (04/07/2015), CKD (chronic kidney disease), Cortical age-related cataract, bilateral (11/23/2016), Food insecurity (05/19/2022), HTN (hypertension), Keratoconjunctivitis due to adenovirus (08/04/2016), Multiple myeloma, Posterior subcapsular polar age-related cataract, right eye (10/10/2016), Schizophrenia, and Viral conjunctivitis, unspecified (10/10/2016).    Surgical History  He has a past surgical " history that includes Other surgical history (07/28/2017).    Oncology History Overview Note   Smoldering multiple myeloma, ISS stage II:  Characterized by IgG lambda-restricted paraprotein, initially 2.9 g/dL without evidence of end-organ damage. No lytic bone lesions. No anemia. No hypercalcemia. No renal dysfunction. 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 His kappa lambda light chains are low risk. He also has microcytosis likely secondary to alpha-thalassemia  versus iron deficiency.     Symptomatic IgG Lambda Multiple Myeloma ISS stage II   Characterized by anemia noted in 8/2016    Lenalidomide (25 mg daily 21 days on 7 days off) plus Dexamethasone (40 mg once weekly) started on 9/15/2016, stopped briefly due to rash restarted on 12/17/2016    Started maintenance Lenalidomide 10 mg  5/2017 every other day decreasing to 2.5 mg daily 12/2017.    PET/CT (11/7/23):   There is no hypermetabolic activity to suggest active focal  neoplastic process. No increased FDG uptake in the right scapula to  suggest disease involvement.    Continued pancytopenia noted through 2023. Revlimid dose reduced to 2.5mg 3 weeks on, 1 week off in 1/2024.     Held Revlimid as of 2/2024 due to continued cytopenias.    -- NORMOCELLULAR BONE MARROW WITH  MATURING TRILINEAGE HEMATOPOESIS AND GRANULOCYTIC HYPOPLASIA, SEE NOTE  -- NO DEFINITE MORPHOLOGIC OR IMMUNOPHENOTYPIC EVIDENCE OF PLASMA MAYA NEOPLASM     NOTE: Granulopoiesis appears decreased but the granulocytes present show complete maturation. The finding is of unclear etiology. Clinical correlation and correlation with pending genetic/molecular studies suggested.  Decreased iron storage    - Continue to hold revlimid at this time        Multiple myeloma   10/20/2023 Initial Diagnosis    Multiple myeloma (CMS/[facility])          Social History  He reports  "that he has never smoked. He has never used smokeless tobacco. He reports that he does not drink alcohol and does not use drugs.     Allergies  Lenalidomide and Lisinopril    Review of Systems   Constitutional:  Negative for appetite change, chills and fever.   HENT:  Negative for congestion, sore throat and trouble swallowing.    Eyes: Negative.    Respiratory:  Negative for cough, chest tightness and shortness of breath.    Cardiovascular:  Negative for chest pain and leg swelling.   Gastrointestinal:  Negative for abdominal pain, constipation, diarrhea and vomiting.        Patient states that abdominal pain has resolved.   Endocrine: Negative for polyuria.   Genitourinary:  Negative for dysuria, frequency and urgency.   Musculoskeletal:  Negative for back pain.   Neurological:  Positive for weakness. Negative for dizziness and light-headedness.   Psychiatric/Behavioral:  Negative for confusion, decreased concentration and hallucinations.         Physical Exam  HENT:      Head: Normocephalic.      Nose: Nose normal.   Eyes:      Pupils: Pupils are equal, round, and reactive to light.   Cardiovascular:      Rate and Rhythm: Normal rate and regular rhythm.      Heart sounds: Normal heart sounds.   Pulmonary:      Comments: CODI and LLL diminished  Abdominal:      General: Bowel sounds are normal.      Palpations: Abdomen is soft.      Tenderness: There is no abdominal tenderness. There is no guarding.   Musculoskeletal:      Right lower leg: No edema.      Left lower leg: No edema.   Skin:     General: Skin is warm and dry.      Capillary Refill: Capillary refill takes less than 2 seconds.   Neurological:      Mental Status: He is alert and oriented to person, place, and time. Mental status is at baseline.   Psychiatric:      Comments: Tardive dyskinesia noted          Last Recorded Vitals  Blood pressure 142/81, pulse 87, temperature 36.8 °C (98.3 °F), temperature source Oral, resp. rate 16, height 1.727 m (5' 8\"), " weight 58.5 kg (129 lb), SpO2 95%.    Relevant Results  Results for orders placed or performed during the hospital encounter of 06/19/25 (from the past 24 hours)   CBC and Auto Differential   Result Value Ref Range    WBC 5.5 4.4 - 11.3 x10*3/uL    nRBC 0.0 0.0 - 0.0 /100 WBCs    RBC 5.42 4.50 - 5.90 x10*6/uL    Hemoglobin 14.5 13.5 - 17.5 g/dL    Hematocrit 45.1 41.0 - 52.0 %    MCV 83 80 - 100 fL    MCH 26.8 26.0 - 34.0 pg    MCHC 32.2 32.0 - 36.0 g/dL    RDW 16.6 (H) 11.5 - 14.5 %    Platelets 161 150 - 450 x10*3/uL    Neutrophils % 50.6 40.0 - 80.0 %    Immature Granulocytes %, Automated 0.2 0.0 - 0.9 %    Lymphocytes % 40.2 13.0 - 44.0 %    Monocytes % 8.2 2.0 - 10.0 %    Eosinophils % 0.4 0.0 - 6.0 %    Basophils % 0.4 0.0 - 2.0 %    Neutrophils Absolute 2.79 1.60 - 5.50 x10*3/uL    Immature Granulocytes Absolute, Automated 0.01 0.00 - 0.50 x10*3/uL    Lymphocytes Absolute 2.21 0.80 - 3.00 x10*3/uL    Monocytes Absolute 0.45 0.05 - 0.80 x10*3/uL    Eosinophils Absolute 0.02 0.00 - 0.40 x10*3/uL    Basophils Absolute 0.02 0.00 - 0.10 x10*3/uL   Urinalysis with Reflex Culture and Microscopic   Result Value Ref Range    Color, Urine Yellow Light-Yellow, Yellow, Dark-Yellow    Appearance, Urine Turbid (N) Clear    Specific Gravity, Urine 1.029 1.005 - 1.035    pH, Urine 5.5 5.0, 5.5, 6.0, 6.5, 7.0, 7.5, 8.0    Protein, Urine 50 (1+) (A) NEGATIVE, 10 (TRACE), 20 (TRACE) mg/dL    Glucose, Urine Normal Normal mg/dL    Blood, Urine NEGATIVE NEGATIVE mg/dL    Ketones, Urine TRACE (A) NEGATIVE mg/dL    Bilirubin, Urine NEGATIVE NEGATIVE mg/dL    Urobilinogen, Urine 2 (1+) (A) Normal mg/dL    Nitrite, Urine NEGATIVE NEGATIVE    Leukocyte Esterase, Urine 500 Aisha/uL (A) NEGATIVE   Microscopic Only, Urine   Result Value Ref Range    WBC, Urine 21-50 (A) 1-5, NONE /HPF    RBC, Urine 6-10 (A) NONE, 1-2, 3-5 /HPF    Squamous Epithelial Cells, Urine 1-9 (SPARSE) Reference range not established. /HPF    Mucus, Urine FEW  Reference range not established. /LPF    Hyaline Casts, Urine 3+ (A) NONE /LPF   Comprehensive metabolic panel   Result Value Ref Range    Glucose 121 (H) 74 - 99 mg/dL    Sodium 137 136 - 145 mmol/L    Potassium 7.0 (HH) 3.5 - 5.3 mmol/L    Chloride 91 (L) 98 - 107 mmol/L    Bicarbonate 36 (H) 21 - 32 mmol/L    Anion Gap 17 10 - 20 mmol/L    Urea Nitrogen 39 (H) 6 - 23 mg/dL    Creatinine 2.99 (H) 0.50 - 1.30 mg/dL    eGFR 21 (L) >60 mL/min/1.73m*2    Calcium 9.5 8.6 - 10.6 mg/dL    Albumin 4.7 3.4 - 5.0 g/dL    Alkaline Phosphatase 74 33 - 136 U/L    Total Protein 8.3 (H) 6.4 - 8.2 g/dL    AST 25 9 - 39 U/L    Bilirubin, Total 0.6 0.0 - 1.2 mg/dL    ALT 6 (L) 10 - 52 U/L   Magnesium   Result Value Ref Range    Magnesium 4.62 (H) 1.60 - 2.40 mg/dL       CT thoracic spine wo IV contrast  Result Date: 6/19/2025  Interpreted By:  Zoya Campo and Ritchie Brandon STUDY: CT CERVICAL SPINE WO IV CONTRAST; CT LUMBAR SPINE RETROSPECTIVE RECONSTRUCTION PROTOCOL; CT THORACIC SPINE WO IV CONTRAST;  6/19/2025 8:43 pm; 6/19/2025 9:13 pm   INDICATION: Signs/Symptoms:metastasis.   COMPARISON: PET-CT 11/07/2023. CT abdomen and pelvis 03/28/2012.   ACCESSION NUMBER(S): [id number]; [id number]; [id number]   ORDERING CLINICIAN: DARRYL WHELAN   TECHNIQUE: Axial noncontrast images of the cervical, thoracic and lumbar spine with coronal and sagittal reconstructed images.   FINDINGS: CT CERVICAL SPINE:   PREVERTEBRAL SOFT TISSUES: Within normal limits.   CRANIOCERVICAL JUNCTION: Intact.   ALIGNMENT:  No traumatic malalignment or traumatic facet widening.   VERTEBRAE: No focal osseous lesions. No acute fracture. Vertebral body heights are maintained with multilevel degenerative endplate changes anterior/posterior osteophytes.   SPINAL CANAL/INTERVERTEBRAL DISCS: Varying degrees of degenerative disc disease, including multilevel posterior disc osteophyte complexes which contribute to at most moderate spinal canal stenosis at  C3-4 and C5-6. No evidence of high-grade spinal canal stenosis is evident.   NEURAL FORAMINA:  Multilevel uncovertebral joint and facet arthropathy notably contribute to moderate-to-severe bilateral neural foraminal stenosis at C3-4. There is left and moderate right bilateral neural foraminal stenosis at C4-5.   OTHER: None.     CT THORACIC SPINE:   PARASPINAL SOFT TISSUES: No significant abnormality.   ALIGNMENT:  No traumatic malalignment or traumatic facet widening.   VERTEBRAE: No suspicious/focal osseous lesion. No acute fracture. Vertebral body heights are maintained with minimal amount of degenerative endplate changes.   SPINAL CANAL/INTERVERTEBRAL DISCS: No high-grade spinal canal stenosis. Minimal amount of intervertebral disc height loss at T3-4.   VISUALIZED CHEST: No evidence of focal consolidation or significant abnormality. Coronary artery calcifications noted however exam is not optimized for evaluation.     CT LUMBAR SPINE:   PARASPINAL SOFT TISSUES: No significant abnormality.   ALIGNMENT:  No traumatic malalignment or traumatic facet widening.   VERTEBRAE: Minimal amount of wedging of the superior endplate of the L3 vertebral body, likely reflecting a degenerative Schmorl's node, faintly visualized on the prior PET-CT from 11/07/2023. Minimal amount of wedging/height loss of the anterior aspect of the L2 vertebral body could reflect an age-indeterminate compression fracture.  Vertebral body heights are maintained. No focal osseous abnormality.   SPINAL CANAL/INTERVERTEBRAL DISCS:   Varying degrees of degenerative disc disease, most significant at L1-2 and L2-3 where there is mild intervertebral disc height loss and degenerative endplate changes. There is mild circumferential bulging of the intervertebral disc at L2-3 and ligamentum flavum hypertrophy contributing to mild spinal canal stenosis.   NEURAL FORAMINA:  Varying degrees of neural foraminal stenosis, most significant on the left at L4-5  where there is mild-to-moderate stenosis due to bulging of the intervertebral disc.   VISUALIZED ABDOMEN: Please refer to dedicated imaging of the abdomen and pelvis for further characterization.       1. No evidence of focal destructive osseous lesion to suggest metastatic disease throughout the visualized structures of the cervical, thoracic, lumbar spine. 2. Minimal amount of anterior wedging/height loss of the L2 vertebral body which could be degenerative in etiology however an age-indeterminate compression fracture is not excluded. 3. L3 anterior wedge deformity, new from 2012 and similar to 2023. 4. Varying degrees of degenerative changes of the spine as above. No critical canal stenosis.   I personally reviewed the images/study and I agree with the findings as stated by resident David Romero. This study was interpreted at [place], [place].   MACRO: None.   Signed by: Zoya Campo 6/19/2025 10:42 PM Dictation workstation:   [id number]    CT cervical spine wo IV contrast  Result Date: 6/19/2025  Interpreted By:  Zoya Campo,  Estelle Hernandez STUDY: CT CERVICAL SPINE WO IV CONTRAST; CT LUMBAR SPINE RETROSPECTIVE RECONSTRUCTION PROTOCOL; CT THORACIC SPINE WO IV CONTRAST;  6/19/2025 8:43 pm; 6/19/2025 9:13 pm   INDICATION: Signs/Symptoms:metastasis.   COMPARISON: PET-CT 11/07/2023. CT abdomen and pelvis 03/28/2012.   ACCESSION NUMBER(S): [id number]; [id number]; [id number]   ORDERING CLINICIAN: DARRYL WHELAN   TECHNIQUE: Axial noncontrast images of the cervical, thoracic and lumbar spine with coronal and sagittal reconstructed images.   FINDINGS: CT CERVICAL SPINE:   PREVERTEBRAL SOFT TISSUES: Within normal limits.   CRANIOCERVICAL JUNCTION: Intact.   ALIGNMENT:  No traumatic malalignment or traumatic facet widening.   VERTEBRAE: No focal osseous lesions. No acute fracture. Vertebral body heights are maintained with multilevel degenerative  endplate changes anterior/posterior osteophytes.   SPINAL CANAL/INTERVERTEBRAL DISCS: Varying degrees of degenerative disc disease, including multilevel posterior disc osteophyte complexes which contribute to at most moderate spinal canal stenosis at C3-4 and C5-6. No evidence of high-grade spinal canal stenosis is evident.   NEURAL FORAMINA:  Multilevel uncovertebral joint and facet arthropathy notably contribute to moderate-to-severe bilateral neural foraminal stenosis at C3-4. There is left and moderate right bilateral neural foraminal stenosis at C4-5.   OTHER: None.     CT THORACIC SPINE:   PARASPINAL SOFT TISSUES: No significant abnormality.   ALIGNMENT:  No traumatic malalignment or traumatic facet widening.   VERTEBRAE: No suspicious/focal osseous lesion. No acute fracture. Vertebral body heights are maintained with minimal amount of degenerative endplate changes.   SPINAL CANAL/INTERVERTEBRAL DISCS: No high-grade spinal canal stenosis. Minimal amount of intervertebral disc height loss at T3-4.   VISUALIZED CHEST: No evidence of focal consolidation or significant abnormality. Coronary artery calcifications noted however exam is not optimized for evaluation.     CT LUMBAR SPINE:   PARASPINAL SOFT TISSUES: No significant abnormality.   ALIGNMENT:  No traumatic malalignment or traumatic facet widening.   VERTEBRAE: Minimal amount of wedging of the superior endplate of the L3 vertebral body, likely reflecting a degenerative Schmorl's node, faintly visualized on the prior PET-CT from 11/07/2023. Minimal amount of wedging/height loss of the anterior aspect of the L2 vertebral body could reflect an age-indeterminate compression fracture.  Vertebral body heights are maintained. No focal osseous abnormality.   SPINAL CANAL/INTERVERTEBRAL DISCS:   Varying degrees of degenerative disc disease, most significant at L1-2 and L2-3 where there is mild intervertebral disc height loss and degenerative endplate changes. There  is mild circumferential bulging of the intervertebral disc at L2-3 and ligamentum flavum hypertrophy contributing to mild spinal canal stenosis.   NEURAL FORAMINA:  Varying degrees of neural foraminal stenosis, most significant on the left at L4-5 where there is mild-to-moderate stenosis due to bulging of the intervertebral disc.   VISUALIZED ABDOMEN: Please refer to dedicated imaging of the abdomen and pelvis for further characterization.       1. No evidence of focal destructive osseous lesion to suggest metastatic disease throughout the visualized structures of the cervical, thoracic, lumbar spine. 2. Minimal amount of anterior wedging/height loss of the L2 vertebral body which could be degenerative in etiology however an age-indeterminate compression fracture is not excluded. 3. L3 anterior wedge deformity, new from 2012 and similar to 2023. 4. Varying degrees of degenerative changes of the spine as above. No critical canal stenosis.   I personally reviewed the images/study and I agree with the findings as stated by resident David Romero. This study was interpreted at [place], [place].   MACRO: None.   Signed by: Zoya Campo 6/19/2025 10:42 PM Dictation workstation:   [id number]    CT lumbar spine retrospective reconstruction protocol  Result Date: 6/19/2025  Interpreted By:  Zoya Campo,  Estelle Hernandez STUDY: CT CERVICAL SPINE WO IV CONTRAST; CT LUMBAR SPINE RETROSPECTIVE RECONSTRUCTION PROTOCOL; CT THORACIC SPINE WO IV CONTRAST;  6/19/2025 8:43 pm; 6/19/2025 9:13 pm   INDICATION: Signs/Symptoms:metastasis.   COMPARISON: PET-CT 11/07/2023. CT abdomen and pelvis 03/28/2012.   ACCESSION NUMBER(S): [id number]; [id number]; [id number]   ORDERING CLINICIAN: DARRYL WHELAN   TECHNIQUE: Axial noncontrast images of the cervical, thoracic and lumbar spine with coronal and sagittal reconstructed images.   FINDINGS: CT CERVICAL SPINE:   PREVERTEBRAL  SOFT TISSUES: Within normal limits.   CRANIOCERVICAL JUNCTION: Intact.   ALIGNMENT:  No traumatic malalignment or traumatic facet widening.   VERTEBRAE: No focal osseous lesions. No acute fracture. Vertebral body heights are maintained with multilevel degenerative endplate changes anterior/posterior osteophytes.   SPINAL CANAL/INTERVERTEBRAL DISCS: Varying degrees of degenerative disc disease, including multilevel posterior disc osteophyte complexes which contribute to at most moderate spinal canal stenosis at C3-4 and C5-6. No evidence of high-grade spinal canal stenosis is evident.   NEURAL FORAMINA:  Multilevel uncovertebral joint and facet arthropathy notably contribute to moderate-to-severe bilateral neural foraminal stenosis at C3-4. There is left and moderate right bilateral neural foraminal stenosis at C4-5.   OTHER: None.     CT THORACIC SPINE:   PARASPINAL SOFT TISSUES: No significant abnormality.   ALIGNMENT:  No traumatic malalignment or traumatic facet widening.   VERTEBRAE: No suspicious/focal osseous lesion. No acute fracture. Vertebral body heights are maintained with minimal amount of degenerative endplate changes.   SPINAL CANAL/INTERVERTEBRAL DISCS: No high-grade spinal canal stenosis. Minimal amount of intervertebral disc height loss at T3-4.   VISUALIZED CHEST: No evidence of focal consolidation or significant abnormality. Coronary artery calcifications noted however exam is not optimized for evaluation.     CT LUMBAR SPINE:   PARASPINAL SOFT TISSUES: No significant abnormality.   ALIGNMENT:  No traumatic malalignment or traumatic facet widening.   VERTEBRAE: Minimal amount of wedging of the superior endplate of the L3 vertebral body, likely reflecting a degenerative Schmorl's node, faintly visualized on the prior PET-CT from 11/07/2023. Minimal amount of wedging/height loss of the anterior aspect of the L2 vertebral body could reflect an age-indeterminate compression fracture.  Vertebral body  heights are maintained. No focal osseous abnormality.   SPINAL CANAL/INTERVERTEBRAL DISCS:   Varying degrees of degenerative disc disease, most significant at L1-2 and L2-3 where there is mild intervertebral disc height loss and degenerative endplate changes. There is mild circumferential bulging of the intervertebral disc at L2-3 and ligamentum flavum hypertrophy contributing to mild spinal canal stenosis.   NEURAL FORAMINA:  Varying degrees of neural foraminal stenosis, most significant on the left at L4-5 where there is mild-to-moderate stenosis due to bulging of the intervertebral disc.   VISUALIZED ABDOMEN: Please refer to dedicated imaging of the abdomen and pelvis for further characterization.       1. No evidence of focal destructive osseous lesion to suggest metastatic disease throughout the visualized structures of the cervical, thoracic, lumbar spine. 2. Minimal amount of anterior wedging/height loss of the L2 vertebral body which could be degenerative in etiology however an age-indeterminate compression fracture is not excluded. 3. L3 anterior wedge deformity, new from 2012 and similar to 2023. 4. Varying degrees of degenerative changes of the spine as above. No critical canal stenosis.   I personally reviewed the images/study and I agree with the findings as stated by resident David Romero. This study was interpreted at [place], [place].   MACRO: None.   Signed by: Zoya Campo 6/19/2025 10:42 PM Dictation workstation:   [id number]    CT abdomen pelvis w IV contrast  Result Date: 6/19/2025  Interpreted By:  Zoya Campo and Ritchie Brandon STUDY: CT ABDOMEN PELVIS W IV CONTRAST;  6/19/2025 9:03 pm   INDICATION: Signs/Symptoms:abdominal pain.     Signs/Symptoms:abdominal pain. Past medical history of multiple myeloma.   COMPARISON: PET-CT 11/07/2023. Concurrent CT cervicothoracic and lumbar spine from 06/19/2025.   ACCESSION NUMBER(S):  [id number]   ORDERING CLINICIAN: VIVIENNE LANE   TECHNIQUE: CT of the abdomen and pelvis was performed.  Standard contiguous axial images were obtained at 3 mm slice thickness through the abdomen and pelvis. Coronal and sagittal reconstructions at 3 mm slice thickness were performed.  75 ML of Omnipaque 350 was administered intravenously without immediate complication.   FINDINGS: LOWER CHEST: The visualized lung base is unremarkable. The heart is normal in size without pericardial effusion. No pleural effusion is present. Visualized distal esophagus appears normal.   ABDOMEN:   LIVER: The liver is normal in size without evidence of focal liver lesions.   BILE DUCTS: The intrahepatic and extrahepatic ducts are not dilated.   GALLBLADDER: The gallbladder is present.   PANCREAS: The pancreas appears unremarkable without evidence of ductal dilatation or masses.   SPLEEN: The spleen is normal in size without focal lesions.   ADRENAL GLANDS: Bilateral adrenal glands appear normal.   KIDNEYS AND URETERS: The kidneys are normal in size and enhance symmetrically.  There are fluid density lesions throughout the bilateral kidneys,, largest in the interpolar region of the right kidney measuring 5.0 cm in greatest diameter (series 301, image 44) with additional reference lesion in the inferior pole of the left kidney measuring 1.4 cm (series 301, image 55). There are few scattered subcentimeter lesions of similar fluid density which are too small to characterized throughout the kidneys however favored to reflect benign renal cysts.   PELVIS:   BLADDER: The urinary bladder appears normal without abnormal wall thickening.   REPRODUCTIVE ORGANS: The prostate is not enlarged.   BOWEL: The stomach is unremarkable with some dependent layering hyperdense material. Small and large bowel are overall normal in caliber however there are mildly prominent fluid-filled loops of distal small bowel/proximal colon without evidence of marked  surrounding wall thickening/inflammatory stranding. There is fluid throughout the colon with some mixed hyperdense layering material in the rectal vault. Normal appendix.   VESSELS: There is no aneurysmal dilatation of the abdominal aorta. The IVC appears normal.   PERITONEUM/RETROPERITONEUM/LYMPH NODES: There is no free or loculated fluid collection, no free intraperitoneal air. The retroperitoneum appears normal.  No enlarged mesenteric lymph nodes.   BONES AND ABDOMINAL WALL: Please refer to dedicated imaging of the spine from 06/19/2025 for further characterization of osseous findings. There are no suspicious osseous lesions involving the appendicular skeleton or the osseous structures of the pelvis.   Small left fat containing inguinal hernia.       1.  Nonspecific mild prominent fluid-filled loops of distal small bowel and colon which can be seen in the setting of enterocolitis. There is some hyperdense layering fluid in the rectal vault and dependent aspect of the stomach which could reflect ingested hyperdense material or remote contrast. 2. Otherwise,  no evidence of acute process within the abdomen or pelvis. 3. Additional chronic and incidental findings as above including scattered low-density cystic lesions of the bilateral kidneys which are indeterminate however favored to reflect benign renal cysts. 4. Please refer to dedicated imaging of the spine 06/19/2025 for further characterization of osseous findings.   I personally reviewed the images/study and I agree with the findings as stated by resident David Romero. This study was interpreted at [facility], [place], [place].   MACRO: None   Signed by: Zoya Campo 6/19/2025 10:32 PM Dictation workstation:   [id number]         Assessment/Plan   Assessment & Plan  Lower abdominal pain    Hypokalemia      Varinder Lizama is a 75 y.o. male with PMHx of IgG Lambda Multiple Myeloma, Stage 2 CKD, HTN, tobacco use disorder,  PVD and schizophrenia who presents from the [facility] ED after an appointment with his Oncologist this morning where he c/o abdominal pain and weakness.  He was found to have hypokalemia, Stage 2 GODFREY, and suspected UTI.  He is being admitted for further management.    ONC:  # IgG Lambda Multiple Myeloma  :: See above Oncology History  :: Revlimid has been on hold since 2/15/24 d/t cytopenias.  :: BMBx 2/22/24: Normocellular bone marrow with no definite morphologic or immunophenotypic evidence of plasma cell neoplasm.    Heme:  - Keep Hgb > 7 and Plt > 10  - DVT ppx: Heparin subcutaneous TID     ID:  # Suspected UTI (UA +LE and WBC)  :: No leukocytosis or fever.  :: Urine Cx 6/19: pending.  - ID ppx: Continue home Dapsone 100 mg PO daily  - Continue Rocephin 1 GM IV daily    FEN/GI/Renal:  Admit wt:  58.5 kg  F: LR @ 75 mL/hr E:  Replete PRN N:  Regular  GI prophylaxis:  Continue home Pepcid 40 mg PO daily  H/O Stage 2 CKD (baseline Cr ~ 1.00)  # Protein Calorie Malnutrition  # Unintentional Weight Loss  # Hypokalemia  # Stage 2 GODFREY likely r/t hypovolemia  # Abdominal pain- resolved  :: Weight loss over the last year of ~ 20 lbs.  :: Abdominal pain resolved.  Patient denies N/V/D or constipation.  :: CT imaging as above.  - Start LR @ 75 mL/hr  - BID RFP  - Continue home Colace 100 mg PO BID  - Start Zofran 4 mg PO or IV PRN nausea  - Start Bisacodyl 10 mg PO daily PRN constipation  - Consult dietician    NEURO:  H/O schizophrenia  # Generalized weakness  :: Tardive Dyskinesia noted on exam.  :: Patient states that he lives alone and does not utilize assistive devices.  - Continue home Cogentin 1 mg PO daily  - Continue home Zyprexa 20 mg PO at bedtime  - Continue home Artane 1 mg PO at bedtime  - Consult PT    CV:  H/O HTN  :: No echocardiogram on file.  - EKG on admit  - Keep Mg+ > 2 and K+ > 4.0  - Continue home Norvasc 10 mg PO    PULM:  :: Oxygenating well on RA.  :: LS diminished in CODI and LLL.  - Encourage  ambulation and IS q1 hour x 10 while awake    Endo:  :: No history of endocrinopathies.    BMT Checklist:  ID Prophylaxis:  Dapsone  Code status:  Full Code  Lines/drains:  PIV x 2  Primary oncologist:  Dr. Cutler  Disposition:  Admit to [facility]         I spent 75 minutes in the professional and overall care of this patient.      Kristina Cortez, APRN-CNP

## 2025-06-20 NOTE — PROGRESS NOTES
Pharmacy Medication History Review    Varinder Lizama is a 75 y.o. male admitted for Lower abdominal pain. Pharmacy reviewed the patient's [id number] medications and allergies for accuracy.    Medications ADDED:  Ketoconazole 2% cream   Medications CHANGED:  None  Medications REMOVED:   Benztropine 1 mg tablet   Epinephrine 0.3 mg / 0.3 mL injection syringe     The list below reflects the updated PTA list.   Prior to Admission Medications   Prescriptions Last Dose Informant   OLANZapine (ZyPREXA) 20 mg tablet 6/18/2025 Bedtime Self   Sig: Take 1 tablet (20 mg) by mouth once daily at bedtime.   amLODIPine (Norvasc) 10 mg tablet 6/18/2025 Evening Self   Sig: Take 1 tablet (10 mg) by mouth once daily.   dapsone 100 mg tablet 6/19/2025 Morning Self   Sig: Take 1 tablet (100 mg) by mouth once daily.   docusate sodium (Colace) 100 mg capsule 6/18/2025 Bedtime Self   Sig: Take 1 capsule (100 mg) by mouth 2 times a day.   famotidine (Pepcid) 40 mg tablet 6/18/2025 Bedtime Self   Sig: Take 1 tablet (40 mg) by mouth once daily at bedtime.   ketoconazole (NIZOral) 2 % cream Past Week Self   Sig: Apply topically once daily. To affected areas.   trihexyphenidyl (Artane) 2 mg tablet 6/18/2025 Bedtime Self   Sig: Take 0.5 tablets (1 mg) by mouth once daily at bedtime.      Facility-Administered Medications: None        The list below reflects the updated allergy list. Please review each documented allergy for additional clarification and justification.  Allergies  Reviewed by Erika Henry on 6/19/2025        Severity Reactions Comments    Lenalidomide Low Unknown     Lisinopril Low Angioedema             Patient declines M2B at discharge.     Sources:   [facility] - no hx found   Patient interview  Epic medication dispense hx report    chart review    admission med rec grid   CE     Additional Comments:  Patient reports actively taking Dapsone 100 mg tablets , per epic dispense hx there is no fill hx for this medication.  "    Patient reports needing a refill for an Epinephrine 0.3 mg / 0.3 mL injection syringe , per patient he is out of home supply.     Patient is a good historian and appears adherent to current home medications.       Erika Henry  Pharmacy Technician  06/19/25     Secure Chat preferred   If no response call [phone] or [email or web address] \"Med Rec\"   "

## 2025-06-20 NOTE — PROGRESS NOTES
"Varinder Lizama is a 75 y.o. male on day 1 of admission presenting with Lower abdominal pain.    Subjective   Admitted over night. No acute events. His friend Snow is at bedside, who provides further information. Reports that his abdomen started hurting yesterday, but today it is feeling a little better. He complains of constipation, which is not new. He denies HA, SOB, CP, N/V/D.        Objective     Physical Exam  Constitutional:       Comments: Chronically ill appearing    HENT:      Nose: Nose normal.      Mouth/Throat:      Mouth: Mucous membranes are dry.   Eyes:      Extraocular Movements: Extraocular movements intact.      Pupils: Pupils are equal, round, and reactive to light.   Cardiovascular:      Rate and Rhythm: Normal rate and regular rhythm.      Pulses: Normal pulses.      Heart sounds: Normal heart sounds.   Pulmonary:      Effort: Pulmonary effort is normal.      Breath sounds: Normal breath sounds.   Abdominal:      General: Bowel sounds are normal. There is no distension.      Palpations: Abdomen is soft.      Tenderness: There is no abdominal tenderness. There is no guarding.   Musculoskeletal:         General: Normal range of motion.      Cervical back: Normal range of motion.   Skin:     General: Skin is warm and dry.      Capillary Refill: Capillary refill takes less than 2 seconds.   Neurological:      Mental Status: He is alert and oriented to person, place, and time.         Last Recorded Vitals  Blood pressure 133/73, pulse 77, temperature 36.8 °C (98.2 °F), resp. rate 16, height 1.727 m (5' 8\"), weight 56.2 kg (123 lb 14.4 oz), SpO2 99%.  Intake/Output last 3 Shifts:  I/O last 3 completed shifts:  In: 726.3 (12.9 mL/kg) [P.O.:240; I.V.:236.3 (4.2 mL/kg); IV Piggyback:250]  Out: - (0 mL/kg)   Weight: 56.2 kg     Relevant Results  Scheduled medications  Scheduled Medications[1]  Continuous medications  Continuous Medications[2]  PRN medications  PRN Medications[3]    Results for orders placed " or performed during the hospital encounter of 06/19/25 (from the past 24 hours)   CBC and Auto Differential   Result Value Ref Range    WBC 5.5 4.4 - 11.3 x10*3/uL    nRBC 0.0 0.0 - 0.0 /100 WBCs    RBC 5.42 4.50 - 5.90 x10*6/uL    Hemoglobin 14.5 13.5 - 17.5 g/dL    Hematocrit 45.1 41.0 - 52.0 %    MCV 83 80 - 100 fL    MCH 26.8 26.0 - 34.0 pg    MCHC 32.2 32.0 - 36.0 g/dL    RDW 16.6 (H) 11.5 - 14.5 %    Platelets 161 150 - 450 x10*3/uL    Neutrophils % 50.6 40.0 - 80.0 %    Immature Granulocytes %, Automated 0.2 0.0 - 0.9 %    Lymphocytes % 40.2 13.0 - 44.0 %    Monocytes % 8.2 2.0 - 10.0 %    Eosinophils % 0.4 0.0 - 6.0 %    Basophils % 0.4 0.0 - 2.0 %    Neutrophils Absolute 2.79 1.60 - 5.50 x10*3/uL    Immature Granulocytes Absolute, Automated 0.01 0.00 - 0.50 x10*3/uL    Lymphocytes Absolute 2.21 0.80 - 3.00 x10*3/uL    Monocytes Absolute 0.45 0.05 - 0.80 x10*3/uL    Eosinophils Absolute 0.02 0.00 - 0.40 x10*3/uL    Basophils Absolute 0.02 0.00 - 0.10 x10*3/uL   Urinalysis with Reflex Culture and Microscopic   Result Value Ref Range    Color, Urine Yellow Light-Yellow, Yellow, Dark-Yellow    Appearance, Urine Turbid (N) Clear    Specific Gravity, Urine 1.029 1.005 - 1.035    pH, Urine 5.5 5.0, 5.5, 6.0, 6.5, 7.0, 7.5, 8.0    Protein, Urine 50 (1+) (A) NEGATIVE, 10 (TRACE), 20 (TRACE) mg/dL    Glucose, Urine Normal Normal mg/dL    Blood, Urine NEGATIVE NEGATIVE mg/dL    Ketones, Urine TRACE (A) NEGATIVE mg/dL    Bilirubin, Urine NEGATIVE NEGATIVE mg/dL    Urobilinogen, Urine 2 (1+) (A) Normal mg/dL    Nitrite, Urine NEGATIVE NEGATIVE    Leukocyte Esterase, Urine 500 Aisha/uL (A) NEGATIVE   Microscopic Only, Urine   Result Value Ref Range    WBC, Urine 21-50 (A) 1-5, NONE /HPF    RBC, Urine 6-10 (A) NONE, 1-2, 3-5 /HPF    Squamous Epithelial Cells, Urine 1-9 (SPARSE) Reference range not established. /HPF    Mucus, Urine FEW Reference range not established. /LPF    Hyaline Casts, Urine 3+ (A) NONE /LPF    Comprehensive metabolic panel   Result Value Ref Range    Glucose 121 (H) 74 - 99 mg/dL    Sodium 137 136 - 145 mmol/L    Potassium 7.0 (HH) 3.5 - 5.3 mmol/L    Chloride 91 (L) 98 - 107 mmol/L    Bicarbonate 36 (H) 21 - 32 mmol/L    Anion Gap 17 10 - 20 mmol/L    Urea Nitrogen 39 (H) 6 - 23 mg/dL    Creatinine 2.99 (H) 0.50 - 1.30 mg/dL    eGFR 21 (L) >60 mL/min/1.73m*2    Calcium 9.5 8.6 - 10.6 mg/dL    Albumin 4.7 3.4 - 5.0 g/dL    Alkaline Phosphatase 74 33 - 136 U/L    Total Protein 8.3 (H) 6.4 - 8.2 g/dL    AST 25 9 - 39 U/L    Bilirubin, Total 0.6 0.0 - 1.2 mg/dL    ALT 6 (L) 10 - 52 U/L   Magnesium   Result Value Ref Range    Magnesium 4.62 (H) 1.60 - 2.40 mg/dL   Potassium   Result Value Ref Range    Potassium 4.0 3.5 - 5.3 mmol/L   Magnesium   Result Value Ref Range    Magnesium 4.51 (H) 1.60 - 2.40 mg/dL   Type And Screen   Result Value Ref Range    ABO TYPE O     Rh TYPE POS     ANTIBODY SCREEN NEG      CT thoracic spine wo IV contrast  Result Date: 6/19/2025  Interpreted By:  Zoya Campo and Ritchie Brandon STUDY: CT CERVICAL SPINE WO IV CONTRAST; CT LUMBAR SPINE RETROSPECTIVE RECONSTRUCTION PROTOCOL; CT THORACIC SPINE WO IV CONTRAST;  6/19/2025 8:43 pm; 6/19/2025 9:13 pm   INDICATION: Signs/Symptoms:metastasis.   COMPARISON: PET-CT 11/07/2023. CT abdomen and pelvis 03/28/2012.   ACCESSION NUMBER(S): [id number]; [id number]; [id number]   ORDERING CLINICIAN: DARRYL WHELAN   TECHNIQUE: Axial noncontrast images of the cervical, thoracic and lumbar spine with coronal and sagittal reconstructed images.   FINDINGS: CT CERVICAL SPINE:   PREVERTEBRAL SOFT TISSUES: Within normal limits.   CRANIOCERVICAL JUNCTION: Intact.   ALIGNMENT:  No traumatic malalignment or traumatic facet widening.   VERTEBRAE: No focal osseous lesions. No acute fracture. Vertebral body heights are maintained with multilevel degenerative endplate changes anterior/posterior osteophytes.   SPINAL CANAL/INTERVERTEBRAL DISCS:  Varying degrees of degenerative disc disease, including multilevel posterior disc osteophyte complexes which contribute to at most moderate spinal canal stenosis at C3-4 and C5-6. No evidence of high-grade spinal canal stenosis is evident.   NEURAL FORAMINA:  Multilevel uncovertebral joint and facet arthropathy notably contribute to moderate-to-severe bilateral neural foraminal stenosis at C3-4. There is left and moderate right bilateral neural foraminal stenosis at C4-5.   OTHER: None.     CT THORACIC SPINE:   PARASPINAL SOFT TISSUES: No significant abnormality.   ALIGNMENT:  No traumatic malalignment or traumatic facet widening.   VERTEBRAE: No suspicious/focal osseous lesion. No acute fracture. Vertebral body heights are maintained with minimal amount of degenerative endplate changes.   SPINAL CANAL/INTERVERTEBRAL DISCS: No high-grade spinal canal stenosis. Minimal amount of intervertebral disc height loss at T3-4.   VISUALIZED CHEST: No evidence of focal consolidation or significant abnormality. Coronary artery calcifications noted however exam is not optimized for evaluation.     CT LUMBAR SPINE:   PARASPINAL SOFT TISSUES: No significant abnormality.   ALIGNMENT:  No traumatic malalignment or traumatic facet widening.   VERTEBRAE: Minimal amount of wedging of the superior endplate of the L3 vertebral body, likely reflecting a degenerative Schmorl's node, faintly visualized on the prior PET-CT from 11/07/2023. Minimal amount of wedging/height loss of the anterior aspect of the L2 vertebral body could reflect an age-indeterminate compression fracture.  Vertebral body heights are maintained. No focal osseous abnormality.   SPINAL CANAL/INTERVERTEBRAL DISCS:   Varying degrees of degenerative disc disease, most significant at L1-2 and L2-3 where there is mild intervertebral disc height loss and degenerative endplate changes. There is mild circumferential bulging of the intervertebral disc at L2-3 and ligamentum  flavum hypertrophy contributing to mild spinal canal stenosis.   NEURAL FORAMINA:  Varying degrees of neural foraminal stenosis, most significant on the left at L4-5 where there is mild-to-moderate stenosis due to bulging of the intervertebral disc.   VISUALIZED ABDOMEN: Please refer to dedicated imaging of the abdomen and pelvis for further characterization.       1. No evidence of focal destructive osseous lesion to suggest metastatic disease throughout the visualized structures of the cervical, thoracic, lumbar spine. 2. Minimal amount of anterior wedging/height loss of the L2 vertebral body which could be degenerative in etiology however an age-indeterminate compression fracture is not excluded. 3. L3 anterior wedge deformity, new from 2012 and similar to 2023. 4. Varying degrees of degenerative changes of the spine as above. No critical canal stenosis.   I personally reviewed the images/study and I agree with the findings as stated by resident David Romero. This study was interpreted at [place], [place].   MACRO: None.   Signed by: Zoya Campo 6/19/2025 10:42 PM Dictation workstation:   [id number]    CT cervical spine wo IV contrast  Result Date: 6/19/2025  Interpreted By:  Zoya Campo,  Estelle Hernandez STUDY: CT CERVICAL SPINE WO IV CONTRAST; CT LUMBAR SPINE RETROSPECTIVE RECONSTRUCTION PROTOCOL; CT THORACIC SPINE WO IV CONTRAST;  6/19/2025 8:43 pm; 6/19/2025 9:13 pm   INDICATION: Signs/Symptoms:metastasis.   COMPARISON: PET-CT 11/07/2023. CT abdomen and pelvis 03/28/2012.   ACCESSION NUMBER(S): [id number]; [id number]; [id number]   ORDERING CLINICIAN: DARRYL WHELAN   TECHNIQUE: Axial noncontrast images of the cervical, thoracic and lumbar spine with coronal and sagittal reconstructed images.   FINDINGS: CT CERVICAL SPINE:   PREVERTEBRAL SOFT TISSUES: Within normal limits.   CRANIOCERVICAL JUNCTION: Intact.   ALIGNMENT:  No traumatic  malalignment or traumatic facet widening.   VERTEBRAE: No focal osseous lesions. No acute fracture. Vertebral body heights are maintained with multilevel degenerative endplate changes anterior/posterior osteophytes.   SPINAL CANAL/INTERVERTEBRAL DISCS: Varying degrees of degenerative disc disease, including multilevel posterior disc osteophyte complexes which contribute to at most moderate spinal canal stenosis at C3-4 and C5-6. No evidence of high-grade spinal canal stenosis is evident.   NEURAL FORAMINA:  Multilevel uncovertebral joint and facet arthropathy notably contribute to moderate-to-severe bilateral neural foraminal stenosis at C3-4. There is left and moderate right bilateral neural foraminal stenosis at C4-5.   OTHER: None.     CT THORACIC SPINE:   PARASPINAL SOFT TISSUES: No significant abnormality.   ALIGNMENT:  No traumatic malalignment or traumatic facet widening.   VERTEBRAE: No suspicious/focal osseous lesion. No acute fracture. Vertebral body heights are maintained with minimal amount of degenerative endplate changes.   SPINAL CANAL/INTERVERTEBRAL DISCS: No high-grade spinal canal stenosis. Minimal amount of intervertebral disc height loss at T3-4.   VISUALIZED CHEST: No evidence of focal consolidation or significant abnormality. Coronary artery calcifications noted however exam is not optimized for evaluation.     CT LUMBAR SPINE:   PARASPINAL SOFT TISSUES: No significant abnormality.   ALIGNMENT:  No traumatic malalignment or traumatic facet widening.   VERTEBRAE: Minimal amount of wedging of the superior endplate of the L3 vertebral body, likely reflecting a degenerative Schmorl's node, faintly visualized on the prior PET-CT from 11/07/2023. Minimal amount of wedging/height loss of the anterior aspect of the L2 vertebral body could reflect an age-indeterminate compression fracture.  Vertebral body heights are maintained. No focal osseous abnormality.   SPINAL CANAL/INTERVERTEBRAL DISCS:    Varying degrees of degenerative disc disease, most significant at L1-2 and L2-3 where there is mild intervertebral disc height loss and degenerative endplate changes. There is mild circumferential bulging of the intervertebral disc at L2-3 and ligamentum flavum hypertrophy contributing to mild spinal canal stenosis.   NEURAL FORAMINA:  Varying degrees of neural foraminal stenosis, most significant on the left at L4-5 where there is mild-to-moderate stenosis due to bulging of the intervertebral disc.   VISUALIZED ABDOMEN: Please refer to dedicated imaging of the abdomen and pelvis for further characterization.       1. No evidence of focal destructive osseous lesion to suggest metastatic disease throughout the visualized structures of the cervical, thoracic, lumbar spine. 2. Minimal amount of anterior wedging/height loss of the L2 vertebral body which could be degenerative in etiology however an age-indeterminate compression fracture is not excluded. 3. L3 anterior wedge deformity, new from 2012 and similar to 2023. 4. Varying degrees of degenerative changes of the spine as above. No critical canal stenosis.   I personally reviewed the images/study and I agree with the findings as stated by resident David Romero. This study was interpreted at [place], [place].   MACRO: None.   Signed by: Zoya Campo 6/19/2025 10:42 PM Dictation workstation:   [id number]    CT lumbar spine retrospective reconstruction protocol  Result Date: 6/19/2025  Interpreted By:  Zoya Campo and Ritchie Brandon STUDY: CT CERVICAL SPINE WO IV CONTRAST; CT LUMBAR SPINE RETROSPECTIVE RECONSTRUCTION PROTOCOL; CT THORACIC SPINE WO IV CONTRAST;  6/19/2025 8:43 pm; 6/19/2025 9:13 pm   INDICATION: Signs/Symptoms:metastasis.   COMPARISON: PET-CT 11/07/2023. CT abdomen and pelvis 03/28/2012.   ACCESSION NUMBER(S): [id number]; [id number]; [id number]   ORDERING CLINICIAN: DARRYL WHELAN    TECHNIQUE: Axial noncontrast images of the cervical, thoracic and lumbar spine with coronal and sagittal reconstructed images.   FINDINGS: CT CERVICAL SPINE:   PREVERTEBRAL SOFT TISSUES: Within normal limits.   CRANIOCERVICAL JUNCTION: Intact.   ALIGNMENT:  No traumatic malalignment or traumatic facet widening.   VERTEBRAE: No focal osseous lesions. No acute fracture. Vertebral body heights are maintained with multilevel degenerative endplate changes anterior/posterior osteophytes.   SPINAL CANAL/INTERVERTEBRAL DISCS: Varying degrees of degenerative disc disease, including multilevel posterior disc osteophyte complexes which contribute to at most moderate spinal canal stenosis at C3-4 and C5-6. No evidence of high-grade spinal canal stenosis is evident.   NEURAL FORAMINA:  Multilevel uncovertebral joint and facet arthropathy notably contribute to moderate-to-severe bilateral neural foraminal stenosis at C3-4. There is left and moderate right bilateral neural foraminal stenosis at C4-5.   OTHER: None.     CT THORACIC SPINE:   PARASPINAL SOFT TISSUES: No significant abnormality.   ALIGNMENT:  No traumatic malalignment or traumatic facet widening.   VERTEBRAE: No suspicious/focal osseous lesion. No acute fracture. Vertebral body heights are maintained with minimal amount of degenerative endplate changes.   SPINAL CANAL/INTERVERTEBRAL DISCS: No high-grade spinal canal stenosis. Minimal amount of intervertebral disc height loss at T3-4.   VISUALIZED CHEST: No evidence of focal consolidation or significant abnormality. Coronary artery calcifications noted however exam is not optimized for evaluation.     CT LUMBAR SPINE:   PARASPINAL SOFT TISSUES: No significant abnormality.   ALIGNMENT:  No traumatic malalignment or traumatic facet widening.   VERTEBRAE: Minimal amount of wedging of the superior endplate of the L3 vertebral body, likely reflecting a degenerative Schmorl's node, faintly visualized on the prior PET-CT from  11/07/2023. Minimal amount of wedging/height loss of the anterior aspect of the L2 vertebral body could reflect an age-indeterminate compression fracture.  Vertebral body heights are maintained. No focal osseous abnormality.   SPINAL CANAL/INTERVERTEBRAL DISCS:   Varying degrees of degenerative disc disease, most significant at L1-2 and L2-3 where there is mild intervertebral disc height loss and degenerative endplate changes. There is mild circumferential bulging of the intervertebral disc at L2-3 and ligamentum flavum hypertrophy contributing to mild spinal canal stenosis.   NEURAL FORAMINA:  Varying degrees of neural foraminal stenosis, most significant on the left at L4-5 where there is mild-to-moderate stenosis due to bulging of the intervertebral disc.   VISUALIZED ABDOMEN: Please refer to dedicated imaging of the abdomen and pelvis for further characterization.       1. No evidence of focal destructive osseous lesion to suggest metastatic disease throughout the visualized structures of the cervical, thoracic, lumbar spine. 2. Minimal amount of anterior wedging/height loss of the L2 vertebral body which could be degenerative in etiology however an age-indeterminate compression fracture is not excluded. 3. L3 anterior wedge deformity, new from 2012 and similar to 2023. 4. Varying degrees of degenerative changes of the spine as above. No critical canal stenosis.   I personally reviewed the images/study and I agree with the findings as stated by resident David Romero. This study was interpreted at [place], [place].   MACRO: None.   Signed by: Zoya Campo 6/19/2025 10:42 PM Dictation workstation:   [id number]    CT abdomen pelvis w IV contrast  Result Date: 6/19/2025  Interpreted By:  Zoya Campo and Ritchie Brandon STUDY: CT ABDOMEN PELVIS W IV CONTRAST;  6/19/2025 9:03 pm   INDICATION: Signs/Symptoms:abdominal pain.     Signs/Symptoms:abdominal pain.  Past medical history of multiple myeloma.   COMPARISON: PET-CT 11/07/2023. Concurrent CT cervicothoracic and lumbar spine from 06/19/2025.   ACCESSION NUMBER(S): [id number]   ORDERING CLINICIAN: VIVIENNE LANE   TECHNIQUE: CT of the abdomen and pelvis was performed.  Standard contiguous axial images were obtained at 3 mm slice thickness through the abdomen and pelvis. Coronal and sagittal reconstructions at 3 mm slice thickness were performed.  75 ML of Omnipaque 350 was administered intravenously without immediate complication.   FINDINGS: LOWER CHEST: The visualized lung base is unremarkable. The heart is normal in size without pericardial effusion. No pleural effusion is present. Visualized distal esophagus appears normal.   ABDOMEN:   LIVER: The liver is normal in size without evidence of focal liver lesions.   BILE DUCTS: The intrahepatic and extrahepatic ducts are not dilated.   GALLBLADDER: The gallbladder is present.   PANCREAS: The pancreas appears unremarkable without evidence of ductal dilatation or masses.   SPLEEN: The spleen is normal in size without focal lesions.   ADRENAL GLANDS: Bilateral adrenal glands appear normal.   KIDNEYS AND URETERS: The kidneys are normal in size and enhance symmetrically.  There are fluid density lesions throughout the bilateral kidneys,, largest in the interpolar region of the right kidney measuring 5.0 cm in greatest diameter (series 301, image 44) with additional reference lesion in the inferior pole of the left kidney measuring 1.4 cm (series 301, image 55). There are few scattered subcentimeter lesions of similar fluid density which are too small to characterized throughout the kidneys however favored to reflect benign renal cysts.   PELVIS:   BLADDER: The urinary bladder appears normal without abnormal wall thickening.   REPRODUCTIVE ORGANS: The prostate is not enlarged.   BOWEL: The stomach is unremarkable with some dependent layering hyperdense material. Small and  large bowel are overall normal in caliber however there are mildly prominent fluid-filled loops of distal small bowel/proximal colon without evidence of marked surrounding wall thickening/inflammatory stranding. There is fluid throughout the colon with some mixed hyperdense layering material in the rectal vault. Normal appendix.   VESSELS: There is no aneurysmal dilatation of the abdominal aorta. The IVC appears normal.   PERITONEUM/RETROPERITONEUM/LYMPH NODES: There is no free or loculated fluid collection, no free intraperitoneal air. The retroperitoneum appears normal.  No enlarged mesenteric lymph nodes.   BONES AND ABDOMINAL WALL: Please refer to dedicated imaging of the spine from 06/19/2025 for further characterization of osseous findings. There are no suspicious osseous lesions involving the appendicular skeleton or the osseous structures of the pelvis.   Small left fat containing inguinal hernia.       1.  Nonspecific mild prominent fluid-filled loops of distal small bowel and colon which can be seen in the setting of enterocolitis. There is some hyperdense layering fluid in the rectal vault and dependent aspect of the stomach which could reflect ingested hyperdense material or remote contrast. 2. Otherwise,  no evidence of acute process within the abdomen or pelvis. 3. Additional chronic and incidental findings as above including scattered low-density cystic lesions of the bilateral kidneys which are indeterminate however favored to reflect benign renal cysts. 4. Please refer to dedicated imaging of the spine 06/19/2025 for further characterization of osseous findings.   I personally reviewed the images/study and I agree with the findings as stated by resident David Romero. This study was interpreted at [facility], [place], [place].   MACRO: None   Signed by: Zoya Campo 6/19/2025 10:32 PM Dictation workstation:   [id number]      Assessment & Plan  Lower  abdominal pain    Hypokalemia    GODFREY (acute kidney injury)    Abdominal pain    Weakness    Suspected UTI    CKD (chronic kidney disease)      Varinder Lizama is a 75 y.o. male with PMHx of IgG Lambda Multiple Myeloma, Stage 2 CKD, HTN, tobacco use disorder, PVD and schizophrenia who presents from the [facility] ED after an appointment with his Oncologist this morning where he c/o abdominal pain and weakness.  He was found to have hypokalemia, Stage 2 GODFREY, and suspected UTI.  He is being admitted for further management.     ONC:  # IgG Lambda Multiple Myeloma  :: See above Oncology History  :: Revlimid has been on hold since 2/15/24 d/t cytopenias.  :: BMBx 2/22/24: Normocellular bone marrow with no definite morphologic or immunophenotypic evidence of plasma cell neoplasm.     Heme:  - Keep Hgb > 7 and Plt > 10  - DVT ppx: Heparin subcutaneous TID                 ID:  # Suspected UTI (UA +LE and WBC)  :: No leukocytosis or fever.  :: Urine Cx 6/19: pending.  # Suspected Enterocolitis   :: CT A/P 6/19: Nonspecific mild prominent fluid-filled loops of distal small bowel and colon which can be seen in the setting of enterocolitis  - ID ppx: Continue home Dapsone 100 mg PO daily  - Stopped Rocephin 1 GM IV daily 6/20  - Start Cipro/Flagyl 6/20     FEN/GI/Renal:  Admit wt:  58.5 kg  F: LR @ 75 mL/hr         E:  Replete PRN           N:  Regular  GI prophylaxis:  Continue home Pepcid 40 mg PO daily  H/O Stage 2 CKD (baseline Cr ~ 1.00)  # Protein Calorie Malnutrition  # Unintentional Weight Loss  # Hypokalemia  # Stage 2 GODFREY likely r/t hypovolemia  # Abdominal pain- resolved  :: Weight loss over the last year of ~ 20 lbs.  :: Abdominal pain resolved.  Patient denies N/V/D or constipation.  :: CT imaging as above.  - Start LR @ 75 mL/hr  - BID RFP  - Continue home Colace 100 mg PO BID  - Start Zofran 4 mg PO or IV PRN nausea  - Start Bisacodyl 10 mg PO daily PRN constipation  - Consult dietician  # Hypermagnesemia  - 4.31 this  morning   - Urine electrolytes, TSH, Cortisol AM added on   - Renal US ordered   - Consulted nephrology, recs pending      NEURO:  H/O schizophrenia  # Generalized weakness  :: Tardive Dyskinesia noted on exam.  :: Patient states that he lives alone and does not utilize assistive devices.  - Continue home Cogentin 1 mg PO daily  - Continue home Zyprexa 20 mg PO at bedtime  - Continue home Artane 1 mg PO at bedtime  - Consult PT     CV:  H/O HTN  :: No echocardiogram on file.  - EKG on admit  - Keep Mg+ > 2 and K+ > 4.0  - Continue home Norvasc 10 mg PO     PULM:  :: Oxygenating well on RA.  :: LS diminished in CODI and LLL.  - Encourage ambulation and IS q1 hour x 10 while awake        DISPO:  Code status:  Full Code  Lines/drains:  PIV x 2  Primary oncologist:  Dr. Cutler      Pt seen, discussed and examined with MD Marleny Delgado, APRN-CNP         [1] amLODIPine, 10 mg, oral, Daily  benztropine, 1 mg, oral, Daily  cefTRIAXone, 1 g, intravenous, q24h  dapsone, 1 tablet, oral, Daily  docusate sodium, 100 mg, oral, BID  famotidine, 40 mg, oral, Nightly  heparin (porcine), 5,000 Units, subcutaneous, q8h  OLANZapine, 20 mg, oral, Nightly  trihexyphenidyl, 1 mg, oral, Nightly  [2] lactated Ringer's, 75 mL/hr, Last Rate: 75 mL/hr (06/20/25 0356)  [3] PRN medications: bisacodyl, ondansetron ODT **OR** ondansetron

## 2025-06-20 NOTE — CARE PLAN
The patient's goals for the shift include To be able to eat an rest    The clinical goals for the shift include Patient remains stable    Over the shift, the patient did not make progress toward the following goals. Barriers to progression include N/A. Recommendations to address these barriers include patient was able to eat prior to a restful sleep.

## 2025-06-20 NOTE — PROGRESS NOTES
06/20/25 1100   Discharge Planning   Living Arrangements Alone   Support Systems Friends/neighbors   Assistance Needed ADLs   Type of Residence Private residence   Number of Stairs to Enter Residence 0   Number of Stairs Within Residence 0   Do you have animals or pets at home? No   Who is requesting discharge planning? Patient   Home or Post Acute Services In home services   Type of Home Care Services Home PT   Expected Discharge Disposition Home H   Does the patient need discharge transport arranged? No   Financial Resource Strain   How hard is it for you to pay for the very basics like food, housing, medical care, and heating? Not very   Housing Stability   In the last 12 months, was there a time when you were not able to pay the mortgage or rent on time? N   In the past 12 months, how many times have you moved where you were living? 0   At any time in the past 12 months, were you homeless or living in a shelter (including now)? N   Transportation Needs   In the past 12 months, has lack of transportation kept you from medical appointments or from getting medications? no   In the past 12 months, has lack of transportation kept you from meetings, work, or from getting things needed for daily living? No   Patient Choice   Patient / Family choosing to utilize agency / facility established prior to hospitalization Yes  ([facility])     Care Transitions Assessment 6/20    Plan per Medical/Surgical Team: admitted for work up of hypokalemia, abd pain, weakness  Status: inpatient  Payor Source: Mehrdad AMAYA/MADINA  Discharge disposition: home with new [facility] PT  Expected date of discharge: 6/21-22  Barriers: none at this time  PCP / Primary Oncologist: Dr Cutler for dx multiple myeloma  [facility]  Preferred home care agency: [facility]     Met with patient and friend Snow at bedside, introduced self and role. Demographics and insurance verifed with patient. Pt plans to return home alone, friend Snow checks on him  often and helps as needed. Pt declines any HHC/DME use or need, but friend Snow reports he has been weaker and has difficulty getting arpund. PT pending, pt agreeable to home PT if recommended and would like to use [facility]. Pt/friend decline any additional needs at this time. LSW to cont to follow.

## 2025-06-20 NOTE — CONSULTS
"Nutrition Initial Assessment:   Nutrition Assessment    --->Reason for Assessment: Provider consult order    Patient is a 75 y.o. male with Multiple Myeloma, CKD2, and Schizophrenia, admitted for further work-up of abdominal pains and weakness. Found to have UTI, hypokalemia, and UTI.    Nutrition History:  This writer met with pt and pt's friend at bedside earlier today. Pt sitting up in bed at time of visit with him. Speech garbled + was a bit difficult to get pt to elaborate on questions asked of him, some nutrition history limited at this time.    Tells appetite and desire to eat is variable, depending on the day. This has gone on for many months now. Unclear as to whether or not pt regularly consumes oral nutrition supplements at home but has had them in the past and does like both Boost and Ensure.    Since admit, denied any issues with n/v or trouble chewing/swallowing. Abdominal pain has improved. Does have constipation, though this is a common issue. Ordered scrambled eggs for breakfast meal, ate ~25% of them, tells he would have eaten more but didn't have salt so he didn't like them. Planned on ordering lunch meal today.    --Vitamin/Herbal Supplement Use: none noted in review of home meds in EMR  --Food Allergy: pt denies       Anthropometrics:  Height: 172.7 cm (5' 8\")   Weight: 56.2 kg (123 lb 14.4 oz)   BMI (Calculated): 18.84  IBW/kg (Dietitian Calculated): 70 kg  Percent of IBW: 80 %    Weight History:     Wt Readings per review of EMR:   06/20/25 56.2 kg (123 lb 14.4 oz) (current wt)   06/19/25 58.7 kg (129 lb 6.4 oz)   04/24/25 57 kg (125 lb 9.6 oz)   02/20/25 60.7 kg (133 lb 12.8 oz)--->7% wt loss from this date to present (not significant)   01/09/25 62.6 kg (138 lb 0.1 oz)--->10% wt loss from this date to present (not significant)   11/14/24 64 kg (141 lb 1.5 oz)   06/27/24 67.5 kg (148 lb 13 oz)--->17% wt loss from this date to present (not significant)   04/25/24 68.2 kg (150 lb 5.7 oz) "   03/28/24 64.7 kg (142 lb 10.2 oz)   02/29/24 60.3 kg (132 lb 15 oz)   02/22/24 66.5 kg (146 lb 9.7 oz)   01/18/24 66.5 kg (146 lb 8 oz)   11/16/23 68.3 kg (150 lb 9.2 oz)   10/19/23 67.4 kg (148 lb 9.6 oz)   03/16/23 72.7 kg (160 lb 4.4 oz)     Nutrition Focused Physical Exam Findings:  Subcutaneous Fat Loss:   Orbital Fat Pads: Severe (dark circles, hollowing and loose skin)  Buccal Fat Pads: Severe (hollow, sunken and narrow face)  Triceps: Severe (negligible fat tissue)  Ribs: Defer  Muscle Wasting:  Temporalis: Severe (hollowed scooping depression)  Pectoralis (Clavicular Region): Severe (protruding prominent clavicle)  Deltoid/Trapezius: Severe (squared shoulders, acromion process prominent)  Interosseous: Severe (depressed area between thumb and forefinger)  Trapezius/Infraspinatus/Supraspinatus (Scapular Region): Defer  Quadriceps: Defer  Gastrocnemius: Defer  Edema:  Edema: none  Physical Findings:  Hair: Negative  Eyes: Negative  Nails: Negative  Skin: Negative    Nutrition Significant Labs:  CBC Trend:   Results from last 7 days   Lab Units 06/20/25  0653 06/19/25  1713 06/19/25  0807   WBC AUTO x10*3/uL 5.7 5.5 5.0   RBC AUTO x10*6/uL 4.87 5.42 5.51   HEMOGLOBIN g/dL 12.7* 14.5 14.7   HEMATOCRIT % 41.2 45.1 45.6   MCV fL 85 83 83   PLATELETS AUTO x10*3/uL 205 161 232   BMP Trend:   Results from last 7 days   Lab Units 06/20/25  0653 06/20/25  0047 06/19/25  1956 06/19/25  0807   GLUCOSE mg/dL 108*  --  121* 134*   CALCIUM mg/dL 9.0  --  9.5 10.0   SODIUM mmol/L 142  --  137 141   POTASSIUM mmol/L 3.2*   < > 7.0* 2.8*   CO2 mmol/L 38*  --  36* 42*   CHLORIDE mmol/L 94*  --  91* 88*   BUN mg/dL 40*  --  39* 34*   CREATININE mg/dL 2.63*  --  2.99* 2.89*    < > = values in this interval not displayed.   Liver Function Trend:   Results from last 7 days   Lab Units 06/19/25 1956 06/19/25  0807   ALK PHOS U/L 74 78   AST U/L 25 15   ALT U/L 6* 6*   BILIRUBIN TOTAL mg/dL 0.6 0.6   Renal Lab Trend:   Results  from last 7 days   Lab Units 06/20/25  0653 06/20/25  0047 06/19/25  1956 06/19/25  0807 06/19/25  0807   POTASSIUM mmol/L 3.2* 4.0 7.0*  --  2.8*   PHOSPHORUS mg/dL 3.5  --   --   --   --    SODIUM mmol/L 142  --  137  --  141   MAGNESIUM mg/dL 4.31* 4.51* 4.62*   < >  --    EGFR mL/min/1.73m*2 25*  --  21*  --  22*   BUN mg/dL 40*  --  39*  --  34*   CREATININE mg/dL 2.63*  --  2.99*  --  2.89*    < > = values in this interval not displayed.   Vit B12:   Lab Results   Component Value Date    [id number] 314 03/28/2024   Folate:   Lab Results   Component Value Date    FOLATE 6.2 03/28/2024      Medications:  Scheduled medications  Scheduled Medications[1]  Continuous medications  Continuous Medications[2]  PRN medications  PRN Medications[3]    I/O:   Last BM Date: 06/20/25    Dietary Orders (From admission, onward)       Start     Ordered    06/20/25 1051  Oral nutritional supplements  Until discontinued        Comments: vanilla   Question Answer Comment   Deliver with Breakfast    Deliver with Dinner    Select supplement: Ensure Plus        06/20/25 1051    06/20/25 0132  May Participate in Room Service  ( ROOM SERVICE MAY PARTICIPATE)  Once        Question:  .  Answer:  Yes    06/20/25 0131    06/20/25 0027  Adult diet Regular  Diet effective now        Comments: Heat deli ham and deli turkey to steaming.   Question:  Diet type  Answer:  Regular    06/20/25 0026                Estimated Needs:   Total Energy Estimated Needs in 24 hours (kCal): ~2931-3597  Method for Estimating Needs: 56 x ~32-35  Total Protein Estimated Needs in 24 Hours (g): ~55-70  Method for Estimating 24 Hour Protein Needs: 56 x ~1.0-1.2g/kg--pt with GODFREY/CKD  Total Fluid Estimated Needs in 24 Hours (mL): per MD/team        Nutrition Diagnosis   Malnutrition Diagnosis  Patient has Malnutrition Diagnosis: Yes  Diagnosis Status: New  Malnutrition Diagnosis: Severe malnutrition related to chronic disease or condition  Related to: CA  As  Evidenced by: pt with areas of severe muscle and fat losses, likely consuming <75% estimated energy needs x >1 month    Additional Assessment Information: Considering malnutrition, offered to provide oral nurition supplements to pt in-house--pt agreeable to vanilla Ensure Plus BID.       Nutrition Interventions/Recommendations   Nutrition prescription for oral nutrition    Nutrition Recommendations:  1. Continue Regular Diet, only as tolerated  --If any s/s of dysphagia are noted, recommend formal SLP eval  --RDN placed order for Ensure Plus BID for added nutrition    2. Daily weights (standing preferred, if feasible)    3. Check B12, Folate, 25(OH)-D levels    Nutrition Interventions/Goals:   Meals and Snacks: General healthful diet  Goal: Regular Diet  Medical Food Supplement: Commercial beverage medical food supplement therapy  Goal: Ensure Plus BID (350 kcals, 13g pro each)    Education:  Education Documentation  No documentation found.    Pt/friend denied any questions for RDN at this time.       Nutrition Monitoring and Evaluation   Intake / Amount of food: Consumes at least 50% or more of meals/snacks/supplements    Body Weight: Body weight - Promote weight restoration    Electrolyte and Renal Panel: Electrolytes within normal limits      Goal Status: New goal(s) identified          Time Spent (min): 45 minutes            [1] amLODIPine, 10 mg, oral, Daily  benztropine, 1 mg, oral, Daily  ciprofloxacin, 200 mg, intravenous, q12h  dapsone, 1 tablet, oral, Daily  docusate sodium, 100 mg, oral, BID  famotidine, 40 mg, oral, Nightly  heparin (porcine), 5,000 Units, subcutaneous, q8h  metroNIDAZOLE, 500 mg, intravenous, q8h  OLANZapine, 20 mg, oral, Nightly  trihexyphenidyl, 1 mg, oral, Nightly     [2] lactated Ringer's, 75 mL/hr, Last Rate: 75 mL/hr (06/20/25 0356)     [3] PRN medications: bisacodyl, ondansetron ODT **OR** ondansetron

## 2025-06-21 LAB
ALBUMIN SERPL BCP-MCNC: 3.5 G/DL (ref 3.4–5)
ALBUMIN SERPL BCP-MCNC: 3.8 G/DL (ref 3.4–5)
ALBUMIN SERPL BCP-MCNC: 3.9 G/DL (ref 3.4–5)
ANION GAP SERPL CALC-SCNC: 11 MMOL/L (ref 10–20)
ANION GAP SERPL CALC-SCNC: 12 MMOL/L (ref 10–20)
ANION GAP SERPL CALC-SCNC: 16 MMOL/L (ref 10–20)
BASOPHILS # BLD AUTO: 0.01 X10*3/UL (ref 0–0.1)
BASOPHILS NFR BLD AUTO: 0.2 %
BUN SERPL-MCNC: 31 MG/DL (ref 6–23)
BUN SERPL-MCNC: 33 MG/DL (ref 6–23)
BUN SERPL-MCNC: 41 MG/DL (ref 6–23)
CALCIUM SERPL-MCNC: 8.4 MG/DL (ref 8.6–10.6)
CALCIUM SERPL-MCNC: 8.5 MG/DL (ref 8.6–10.6)
CALCIUM SERPL-MCNC: 8.9 MG/DL (ref 8.6–10.6)
CHLORIDE SERPL-SCNC: 102 MMOL/L (ref 98–107)
CHLORIDE SERPL-SCNC: 102 MMOL/L (ref 98–107)
CHLORIDE SERPL-SCNC: 94 MMOL/L (ref 98–107)
CO2 SERPL-SCNC: 32 MMOL/L (ref 21–32)
CO2 SERPL-SCNC: 34 MMOL/L (ref 21–32)
CO2 SERPL-SCNC: 36 MMOL/L (ref 21–32)
CREAT SERPL-MCNC: 1.46 MG/DL (ref 0.5–1.3)
CREAT SERPL-MCNC: 1.58 MG/DL (ref 0.5–1.3)
CREAT SERPL-MCNC: 2.08 MG/DL (ref 0.5–1.3)
CREAT UR-MCNC: 130 MG/DL (ref 20–370)
CREAT UR-MCNC: 130 MG/DL (ref 20–370)
EGFRCR SERPLBLD CKD-EPI 2021: 33 ML/MIN/1.73M*2
EGFRCR SERPLBLD CKD-EPI 2021: 46 ML/MIN/1.73M*2
EGFRCR SERPLBLD CKD-EPI 2021: 50 ML/MIN/1.73M*2
EOSINOPHIL # BLD AUTO: 0.03 X10*3/UL (ref 0–0.4)
EOSINOPHIL NFR BLD AUTO: 0.7 %
ERYTHROCYTE [DISTWIDTH] IN BLOOD BY AUTOMATED COUNT: 14 % (ref 11.5–14.5)
GLUCOSE BLD MANUAL STRIP-MCNC: 115 MG/DL (ref 74–99)
GLUCOSE SERPL-MCNC: 48 MG/DL (ref 74–99)
GLUCOSE SERPL-MCNC: 86 MG/DL (ref 74–99)
GLUCOSE SERPL-MCNC: 99 MG/DL (ref 74–99)
HCT VFR BLD AUTO: 34.9 % (ref 41–52)
HGB BLD-MCNC: 10.8 G/DL (ref 13.5–17.5)
IMM GRANULOCYTES # BLD AUTO: 0.01 X10*3/UL (ref 0–0.5)
IMM GRANULOCYTES NFR BLD AUTO: 0.2 % (ref 0–0.9)
LYMPHOCYTES # BLD AUTO: 1.37 X10*3/UL (ref 0.8–3)
LYMPHOCYTES NFR BLD AUTO: 29.9 %
MAGNESIUM SERPL-MCNC: 2.9 MG/DL (ref 1.6–2.4)
MCH RBC QN AUTO: 26.1 PG (ref 26–34)
MCHC RBC AUTO-ENTMCNC: 30.9 G/DL (ref 32–36)
MCV RBC AUTO: 84 FL (ref 80–100)
MICROALBUMIN UR-MCNC: 15.4 MG/L
MICROALBUMIN/CREAT UR: 11.8 UG/MG CREAT
MONOCYTES # BLD AUTO: 0.38 X10*3/UL (ref 0.05–0.8)
MONOCYTES NFR BLD AUTO: 8.3 %
NEUTROPHILS # BLD AUTO: 2.78 X10*3/UL (ref 1.6–5.5)
NEUTROPHILS NFR BLD AUTO: 60.7 %
NRBC BLD-RTO: 0 /100 WBCS (ref 0–0)
PHOSPHATE SERPL-MCNC: 2.7 MG/DL (ref 2.5–4.9)
PHOSPHATE SERPL-MCNC: 2.8 MG/DL (ref 2.5–4.9)
PHOSPHATE SERPL-MCNC: 2.9 MG/DL (ref 2.5–4.9)
PLATELET # BLD AUTO: 165 X10*3/UL (ref 150–450)
POTASSIUM SERPL-SCNC: 3 MMOL/L (ref 3.5–5.3)
POTASSIUM SERPL-SCNC: 3 MMOL/L (ref 3.5–5.3)
POTASSIUM SERPL-SCNC: 3.4 MMOL/L (ref 3.5–5.3)
PROT UR-ACNC: 19 MG/DL (ref 5–25)
PROT/CREAT UR: 0.15 MG/MG CREAT (ref 0–0.17)
RBC # BLD AUTO: 4.14 X10*6/UL (ref 4.5–5.9)
SODIUM SERPL-SCNC: 143 MMOL/L (ref 136–145)
SODIUM SERPL-SCNC: 143 MMOL/L (ref 136–145)
SODIUM SERPL-SCNC: 144 MMOL/L (ref 136–145)
WBC # BLD AUTO: 4.6 X10*3/UL (ref 4.4–11.3)

## 2025-06-21 PROCEDURE — 36415 COLL VENOUS BLD VENIPUNCTURE: CPT

## 2025-06-21 PROCEDURE — 2500000004 HC RX 250 GENERAL PHARMACY W/ HCPCS (ALT 636 FOR OP/ED): Performed by: NURSE PRACTITIONER

## 2025-06-21 PROCEDURE — 83735 ASSAY OF MAGNESIUM: CPT

## 2025-06-21 PROCEDURE — 99221 1ST HOSP IP/OBS SF/LOW 40: CPT | Performed by: INTERNAL MEDICINE

## 2025-06-21 PROCEDURE — 80069 RENAL FUNCTION PANEL: CPT

## 2025-06-21 PROCEDURE — 99232 SBSQ HOSP IP/OBS MODERATE 35: CPT | Performed by: STUDENT IN AN ORGANIZED HEALTH CARE EDUCATION/TRAINING PROGRAM

## 2025-06-21 PROCEDURE — 84100 ASSAY OF PHOSPHORUS: CPT

## 2025-06-21 PROCEDURE — 2500000002 HC RX 250 W HCPCS SELF ADMINISTERED DRUGS (ALT 637 FOR MEDICARE OP, ALT 636 FOR OP/ED)

## 2025-06-21 PROCEDURE — 84156 ASSAY OF PROTEIN URINE: CPT

## 2025-06-21 PROCEDURE — 82947 ASSAY GLUCOSE BLOOD QUANT: CPT

## 2025-06-21 PROCEDURE — 85025 COMPLETE CBC W/AUTO DIFF WBC: CPT

## 2025-06-21 PROCEDURE — 82570 ASSAY OF URINE CREATININE: CPT

## 2025-06-21 PROCEDURE — 2500000001 HC RX 250 WO HCPCS SELF ADMINISTERED DRUGS (ALT 637 FOR MEDICARE OP)

## 2025-06-21 PROCEDURE — 2500000002 HC RX 250 W HCPCS SELF ADMINISTERED DRUGS (ALT 637 FOR MEDICARE OP, ALT 636 FOR OP/ED): Performed by: NURSE PRACTITIONER

## 2025-06-21 PROCEDURE — 2500000001 HC RX 250 WO HCPCS SELF ADMINISTERED DRUGS (ALT 637 FOR MEDICARE OP): Performed by: NURSE PRACTITIONER

## 2025-06-21 PROCEDURE — 1170000001 HC PRIVATE ONCOLOGY ROOM DAILY

## 2025-06-21 RX ORDER — POTASSIUM CHLORIDE 750 MG/1
40 TABLET, FILM COATED, EXTENDED RELEASE ORAL 2 TIMES DAILY
Status: COMPLETED | OUTPATIENT
Start: 2025-06-21 | End: 2025-06-21

## 2025-06-21 RX ORDER — METRONIDAZOLE 500 MG/1
500 TABLET ORAL EVERY 12 HOURS SCHEDULED
Status: DISCONTINUED | OUTPATIENT
Start: 2025-06-21 | End: 2025-06-22 | Stop reason: HOSPADM

## 2025-06-21 RX ORDER — CIPROFLOXACIN 500 MG/1
500 TABLET, FILM COATED ORAL DAILY
Status: DISCONTINUED | OUTPATIENT
Start: 2025-06-21 | End: 2025-06-21 | Stop reason: DRUGHIGH

## 2025-06-21 RX ORDER — CIPROFLOXACIN 500 MG/1
250 TABLET, FILM COATED ORAL EVERY 12 HOURS SCHEDULED
Status: DISCONTINUED | OUTPATIENT
Start: 2025-06-21 | End: 2025-06-22 | Stop reason: HOSPADM

## 2025-06-21 RX ORDER — POTASSIUM CHLORIDE 750 MG/1
40 TABLET, FILM COATED, EXTENDED RELEASE ORAL ONCE
Status: COMPLETED | OUTPATIENT
Start: 2025-06-21 | End: 2025-06-21

## 2025-06-21 RX ADMIN — TRIHEXYPHENIDYL HYDROCHLORIDE 1 MG: 2 TABLET ORAL at 20:17

## 2025-06-21 RX ADMIN — AMLODIPINE BESYLATE 10 MG: 10 TABLET ORAL at 08:30

## 2025-06-21 RX ADMIN — DOCUSATE SODIUM 100 MG: 100 CAPSULE, LIQUID FILLED ORAL at 20:17

## 2025-06-21 RX ADMIN — METRONIDAZOLE 500 MG: 500 TABLET ORAL at 20:17

## 2025-06-21 RX ADMIN — POTASSIUM CHLORIDE 40 MEQ: 750 TABLET, FILM COATED, EXTENDED RELEASE ORAL at 12:12

## 2025-06-21 RX ADMIN — OLANZAPINE 20 MG: 5 TABLET, FILM COATED ORAL at 20:17

## 2025-06-21 RX ADMIN — POTASSIUM CHLORIDE 40 MEQ: 750 TABLET, FILM COATED, EXTENDED RELEASE ORAL at 20:17

## 2025-06-21 RX ADMIN — FAMOTIDINE 40 MG: 40 TABLET ORAL at 20:17

## 2025-06-21 RX ADMIN — METRONIDAZOLE 500 MG: 500 TABLET ORAL at 12:12

## 2025-06-21 RX ADMIN — CIPROFLOXACIN 200 MG: 200 INJECTION, SOLUTION INTRAVENOUS at 11:13

## 2025-06-21 RX ADMIN — DAPSONE 100 MG: 100 TABLET ORAL at 08:30

## 2025-06-21 RX ADMIN — POTASSIUM CHLORIDE 40 MEQ: 750 TABLET, FILM COATED, EXTENDED RELEASE ORAL at 22:51

## 2025-06-21 RX ADMIN — BENZTROPINE MESYLATE 1 MG: 1 TABLET ORAL at 08:30

## 2025-06-21 RX ADMIN — METRONIDAZOLE 500 MG: 5 INJECTION, SOLUTION INTRAVENOUS at 04:27

## 2025-06-21 RX ADMIN — CIPROFOLXACIN 250 MG: 500 TABLET ORAL at 20:17

## 2025-06-21 RX ADMIN — DOCUSATE SODIUM 100 MG: 100 CAPSULE, LIQUID FILLED ORAL at 08:30

## 2025-06-21 ASSESSMENT — COGNITIVE AND FUNCTIONAL STATUS - GENERAL
MOBILITY SCORE: 24
DAILY ACTIVITIY SCORE: 24

## 2025-06-21 ASSESSMENT — PAIN - FUNCTIONAL ASSESSMENT
PAIN_FUNCTIONAL_ASSESSMENT: 0-10

## 2025-06-21 ASSESSMENT — PAIN SCALES - GENERAL
PAINLEVEL_OUTOF10: 0 - NO PAIN

## 2025-06-21 NOTE — CARE PLAN
The patient's goals for the shift include   Problem: Discharge Planning  Goal: Discharge to home or other facility with appropriate resources  Outcome: Progressing     Problem: Chronic Conditions and Co-morbidities  Goal: Patient's chronic conditions and co-morbidity symptoms are monitored and maintained or improved  Outcome: Progressing     Problem: Nutrition  Goal: Nutrient intake appropriate for maintaining nutritional needs  Outcome: Progressing       The clinical goals for the shift include Patient will remain HDS throughout shift

## 2025-06-21 NOTE — PROGRESS NOTES
"Varinder Lizama is a 75 y.o. male on day 2 of admission presenting with Lower abdominal pain.    Subjective   No acute events over night. Afebrile. Kidney function is improving. He is sitting up in the chair on rounds. [place] is at bedside. He is fully dressed with this coat on. Reports that his abdominal pain is improving, but still there. He denies HA, SOB, CP, N/V/D. Remaining ROS unremarkable.          Objective     Physical Exam  Constitutional:       Comments: Chronically ill appearing    HENT:      Nose: Nose normal.      Mouth/Throat:      Mouth: Mucous membranes are dry.   Eyes:      Extraocular Movements: Extraocular movements intact.      Pupils: Pupils are equal, round, and reactive to light.   Cardiovascular:      Rate and Rhythm: Normal rate and regular rhythm.      Pulses: Normal pulses.      Heart sounds: Normal heart sounds.   Pulmonary:      Effort: Pulmonary effort is normal.      Breath sounds: Normal breath sounds.   Abdominal:      General: Bowel sounds are normal. There is no distension.      Palpations: Abdomen is soft.      Tenderness: There is no abdominal tenderness. There is no guarding.   Musculoskeletal:         General: Normal range of motion.      Cervical back: Normal range of motion.   Skin:     General: Skin is warm and dry.      Capillary Refill: Capillary refill takes less than 2 seconds.   Neurological:      Mental Status: He is alert and oriented to person, place, and time.         Last Recorded Vitals  Blood pressure 125/68, pulse 77, temperature 36.8 °C (98.2 °F), temperature source Temporal, resp. rate 18, height 1.727 m (5' 8\"), weight 59.5 kg (131 lb 2.8 oz), SpO2 95%.  Intake/Output last 3 Shifts:  I/O last 3 completed shifts:  In: 1790 (31.9 mL/kg) [P.O.:780; I.V.:560 (10 mL/kg); IV Piggyback:450]  Out: 125 (2.2 mL/kg) [Urine:125 (0.1 mL/kg/hr)]  Weight: 56.2 kg     Relevant Results  Scheduled medications  Scheduled Medications[1]  Continuous medications  Continuous " Medications[2]  PRN medications  PRN Medications[3]    Results for orders placed or performed during the hospital encounter of 06/19/25 (from the past 24 hours)   Renal function panel   Result Value Ref Range    Glucose 48 (LL) 74 - 99 mg/dL    Sodium 143 136 - 145 mmol/L    Potassium 3.0 (L) 3.5 - 5.3 mmol/L    Chloride 94 (L) 98 - 107 mmol/L    Bicarbonate 36 (H) 21 - 32 mmol/L    Anion Gap 16 10 - 20 mmol/L    Urea Nitrogen 41 (H) 6 - 23 mg/dL    Creatinine 2.08 (H) 0.50 - 1.30 mg/dL    eGFR 33 (L) >60 mL/min/1.73m*2    Calcium 8.9 8.6 - 10.6 mg/dL    Phosphorus 2.9 2.5 - 4.9 mg/dL    Albumin 3.9 3.4 - 5.0 g/dL   CBC and Auto Differential   Result Value Ref Range    WBC 4.6 4.4 - 11.3 x10*3/uL    nRBC 0.0 0.0 - 0.0 /100 WBCs    RBC 4.14 (L) 4.50 - 5.90 x10*6/uL    Hemoglobin 10.8 (L) 13.5 - 17.5 g/dL    Hematocrit 34.9 (L) 41.0 - 52.0 %    MCV 84 80 - 100 fL    MCH 26.1 26.0 - 34.0 pg    MCHC 30.9 (L) 32.0 - 36.0 g/dL    RDW 14.0 11.5 - 14.5 %    Platelets 165 150 - 450 x10*3/uL    Neutrophils % 60.7 40.0 - 80.0 %    Immature Granulocytes %, Automated 0.2 0.0 - 0.9 %    Lymphocytes % 29.9 13.0 - 44.0 %    Monocytes % 8.3 2.0 - 10.0 %    Eosinophils % 0.7 0.0 - 6.0 %    Basophils % 0.2 0.0 - 2.0 %    Neutrophils Absolute 2.78 1.60 - 5.50 x10*3/uL    Immature Granulocytes Absolute, Automated 0.01 0.00 - 0.50 x10*3/uL    Lymphocytes Absolute 1.37 0.80 - 3.00 x10*3/uL    Monocytes Absolute 0.38 0.05 - 0.80 x10*3/uL    Eosinophils Absolute 0.03 0.00 - 0.40 x10*3/uL    Basophils Absolute 0.01 0.00 - 0.10 x10*3/uL   Renal Function Panel   Result Value Ref Range    Glucose 99 74 - 99 mg/dL    Sodium 144 136 - 145 mmol/L    Potassium 3.0 (L) 3.5 - 5.3 mmol/L    Chloride 102 98 - 107 mmol/L    Bicarbonate 34 (H) 21 - 32 mmol/L    Anion Gap 11 10 - 20 mmol/L    Urea Nitrogen 33 (H) 6 - 23 mg/dL    Creatinine 1.58 (H) 0.50 - 1.30 mg/dL    eGFR 46 (L) >60 mL/min/1.73m*2    Calcium 8.5 (L) 8.6 - 10.6 mg/dL    Phosphorus 2.8  2.5 - 4.9 mg/dL    Albumin 3.5 3.4 - 5.0 g/dL   Magnesium   Result Value Ref Range    Magnesium 2.90 (H) 1.60 - 2.40 mg/dL   POCT GLUCOSE   Result Value Ref Range    POCT Glucose 115 (H) 74 - 99 mg/dL   Albumin-Creatinine Ratio, Urine Random   Result Value Ref Range    Albumin, Urine Random 15.4 Not established mg/L    Creatinine, Urine Random 130.0 20.0 - 370.0 mg/dL    Albumin/Creatinine Ratio 11.8 <30.0 ug/mg Creat   Protein, Urine Random   Result Value Ref Range    Total Protein, Urine Random 19 5 - 25 mg/dL    Creatinine, Urine Random 130.0 20.0 - 370.0 mg/dL    T. Protein/Creatinine Ratio 0.15 0.00 - 0.17 mg/mg Creat     CT thoracic spine wo IV contrast  Result Date: 6/19/2025  Interpreted By:  Zoya Campo and Ritchie Brandon STUDY: CT CERVICAL SPINE WO IV CONTRAST; CT LUMBAR SPINE RETROSPECTIVE RECONSTRUCTION PROTOCOL; CT THORACIC SPINE WO IV CONTRAST;  6/19/2025 8:43 pm; 6/19/2025 9:13 pm   INDICATION: Signs/Symptoms:metastasis.   COMPARISON: PET-CT 11/07/2023. CT abdomen and pelvis 03/28/2012.   ACCESSION NUMBER(S): [id number]; [id number]; [id number]   ORDERING CLINICIAN: DARRYL WHELAN   TECHNIQUE: Axial noncontrast images of the cervical, thoracic and lumbar spine with coronal and sagittal reconstructed images.   FINDINGS: CT CERVICAL SPINE:   PREVERTEBRAL SOFT TISSUES: Within normal limits.   CRANIOCERVICAL JUNCTION: Intact.   ALIGNMENT:  No traumatic malalignment or traumatic facet widening.   VERTEBRAE: No focal osseous lesions. No acute fracture. Vertebral body heights are maintained with multilevel degenerative endplate changes anterior/posterior osteophytes.   SPINAL CANAL/INTERVERTEBRAL DISCS: Varying degrees of degenerative disc disease, including multilevel posterior disc osteophyte complexes which contribute to at most moderate spinal canal stenosis at C3-4 and C5-6. No evidence of high-grade spinal canal stenosis is evident.   NEURAL FORAMINA:  Multilevel uncovertebral joint and  facet arthropathy notably contribute to moderate-to-severe bilateral neural foraminal stenosis at C3-4. There is left and moderate right bilateral neural foraminal stenosis at C4-5.   OTHER: None.     CT THORACIC SPINE:   PARASPINAL SOFT TISSUES: No significant abnormality.   ALIGNMENT:  No traumatic malalignment or traumatic facet widening.   VERTEBRAE: No suspicious/focal osseous lesion. No acute fracture. Vertebral body heights are maintained with minimal amount of degenerative endplate changes.   SPINAL CANAL/INTERVERTEBRAL DISCS: No high-grade spinal canal stenosis. Minimal amount of intervertebral disc height loss at T3-4.   VISUALIZED CHEST: No evidence of focal consolidation or significant abnormality. Coronary artery calcifications noted however exam is not optimized for evaluation.     CT LUMBAR SPINE:   PARASPINAL SOFT TISSUES: No significant abnormality.   ALIGNMENT:  No traumatic malalignment or traumatic facet widening.   VERTEBRAE: Minimal amount of wedging of the superior endplate of the L3 vertebral body, likely reflecting a degenerative Schmorl's node, faintly visualized on the prior PET-CT from 11/07/2023. Minimal amount of wedging/height loss of the anterior aspect of the L2 vertebral body could reflect an age-indeterminate compression fracture.  Vertebral body heights are maintained. No focal osseous abnormality.   SPINAL CANAL/INTERVERTEBRAL DISCS:   Varying degrees of degenerative disc disease, most significant at L1-2 and L2-3 where there is mild intervertebral disc height loss and degenerative endplate changes. There is mild circumferential bulging of the intervertebral disc at L2-3 and ligamentum flavum hypertrophy contributing to mild spinal canal stenosis.   NEURAL FORAMINA:  Varying degrees of neural foraminal stenosis, most significant on the left at L4-5 where there is mild-to-moderate stenosis due to bulging of the intervertebral disc.   VISUALIZED ABDOMEN: Please refer to dedicated  imaging of the abdomen and pelvis for further characterization.       1. No evidence of focal destructive osseous lesion to suggest metastatic disease throughout the visualized structures of the cervical, thoracic, lumbar spine. 2. Minimal amount of anterior wedging/height loss of the L2 vertebral body which could be degenerative in etiology however an age-indeterminate compression fracture is not excluded. 3. L3 anterior wedge deformity, new from 2012 and similar to 2023. 4. Varying degrees of degenerative changes of the spine as above. No critical canal stenosis.   I personally reviewed the images/study and I agree with the findings as stated by resident David Romero. This study was interpreted at [place], [place].   MACRO: None.   Signed by: Zoya Campo 6/19/2025 10:42 PM Dictation workstation:   [id number]    CT cervical spine wo IV contrast  Result Date: 6/19/2025  Interpreted By:  Zoya Campo and Ritchie Brandon STUDY: CT CERVICAL SPINE WO IV CONTRAST; CT LUMBAR SPINE RETROSPECTIVE RECONSTRUCTION PROTOCOL; CT THORACIC SPINE WO IV CONTRAST;  6/19/2025 8:43 pm; 6/19/2025 9:13 pm   INDICATION: Signs/Symptoms:metastasis.   COMPARISON: PET-CT 11/07/2023. CT abdomen and pelvis 03/28/2012.   ACCESSION NUMBER(S): [id number]; [id number]; [id number]   ORDERING CLINICIAN: DARRYL WHELAN   TECHNIQUE: Axial noncontrast images of the cervical, thoracic and lumbar spine with coronal and sagittal reconstructed images.   FINDINGS: CT CERVICAL SPINE:   PREVERTEBRAL SOFT TISSUES: Within normal limits.   CRANIOCERVICAL JUNCTION: Intact.   ALIGNMENT:  No traumatic malalignment or traumatic facet widening.   VERTEBRAE: No focal osseous lesions. No acute fracture. Vertebral body heights are maintained with multilevel degenerative endplate changes anterior/posterior osteophytes.   SPINAL CANAL/INTERVERTEBRAL DISCS: Varying degrees of degenerative disc disease,  including multilevel posterior disc osteophyte complexes which contribute to at most moderate spinal canal stenosis at C3-4 and C5-6. No evidence of high-grade spinal canal stenosis is evident.   NEURAL FORAMINA:  Multilevel uncovertebral joint and facet arthropathy notably contribute to moderate-to-severe bilateral neural foraminal stenosis at C3-4. There is left and moderate right bilateral neural foraminal stenosis at C4-5.   OTHER: None.     CT THORACIC SPINE:   PARASPINAL SOFT TISSUES: No significant abnormality.   ALIGNMENT:  No traumatic malalignment or traumatic facet widening.   VERTEBRAE: No suspicious/focal osseous lesion. No acute fracture. Vertebral body heights are maintained with minimal amount of degenerative endplate changes.   SPINAL CANAL/INTERVERTEBRAL DISCS: No high-grade spinal canal stenosis. Minimal amount of intervertebral disc height loss at T3-4.   VISUALIZED CHEST: No evidence of focal consolidation or significant abnormality. Coronary artery calcifications noted however exam is not optimized for evaluation.     CT LUMBAR SPINE:   PARASPINAL SOFT TISSUES: No significant abnormality.   ALIGNMENT:  No traumatic malalignment or traumatic facet widening.   VERTEBRAE: Minimal amount of wedging of the superior endplate of the L3 vertebral body, likely reflecting a degenerative Schmorl's node, faintly visualized on the prior PET-CT from 11/07/2023. Minimal amount of wedging/height loss of the anterior aspect of the L2 vertebral body could reflect an age-indeterminate compression fracture.  Vertebral body heights are maintained. No focal osseous abnormality.   SPINAL CANAL/INTERVERTEBRAL DISCS:   Varying degrees of degenerative disc disease, most significant at L1-2 and L2-3 where there is mild intervertebral disc height loss and degenerative endplate changes. There is mild circumferential bulging of the intervertebral disc at L2-3 and ligamentum flavum hypertrophy contributing to mild spinal  canal stenosis.   NEURAL FORAMINA:  Varying degrees of neural foraminal stenosis, most significant on the left at L4-5 where there is mild-to-moderate stenosis due to bulging of the intervertebral disc.   VISUALIZED ABDOMEN: Please refer to dedicated imaging of the abdomen and pelvis for further characterization.       1. No evidence of focal destructive osseous lesion to suggest metastatic disease throughout the visualized structures of the cervical, thoracic, lumbar spine. 2. Minimal amount of anterior wedging/height loss of the L2 vertebral body which could be degenerative in etiology however an age-indeterminate compression fracture is not excluded. 3. L3 anterior wedge deformity, new from 2012 and similar to 2023. 4. Varying degrees of degenerative changes of the spine as above. No critical canal stenosis.   I personally reviewed the images/study and I agree with the findings as stated by resident David Romero. This study was interpreted at [place], [place].   MACRO: None.   Signed by: Zoya Campo 6/19/2025 10:42 PM Dictation workstation:   [id number]    CT lumbar spine retrospective reconstruction protocol  Result Date: 6/19/2025  Interpreted By:  Zoya Campo,  Estelle Hernandez STUDY: CT CERVICAL SPINE WO IV CONTRAST; CT LUMBAR SPINE RETROSPECTIVE RECONSTRUCTION PROTOCOL; CT THORACIC SPINE WO IV CONTRAST;  6/19/2025 8:43 pm; 6/19/2025 9:13 pm   INDICATION: Signs/Symptoms:metastasis.   COMPARISON: PET-CT 11/07/2023. CT abdomen and pelvis 03/28/2012.   ACCESSION NUMBER(S): [id number]; [id number]; [id number]   ORDERING CLINICIAN: DARRYL WHELAN   TECHNIQUE: Axial noncontrast images of the cervical, thoracic and lumbar spine with coronal and sagittal reconstructed images.   FINDINGS: CT CERVICAL SPINE:   PREVERTEBRAL SOFT TISSUES: Within normal limits.   CRANIOCERVICAL JUNCTION: Intact.   ALIGNMENT:  No traumatic malalignment or traumatic facet  widening.   VERTEBRAE: No focal osseous lesions. No acute fracture. Vertebral body heights are maintained with multilevel degenerative endplate changes anterior/posterior osteophytes.   SPINAL CANAL/INTERVERTEBRAL DISCS: Varying degrees of degenerative disc disease, including multilevel posterior disc osteophyte complexes which contribute to at most moderate spinal canal stenosis at C3-4 and C5-6. No evidence of high-grade spinal canal stenosis is evident.   NEURAL FORAMINA:  Multilevel uncovertebral joint and facet arthropathy notably contribute to moderate-to-severe bilateral neural foraminal stenosis at C3-4. There is left and moderate right bilateral neural foraminal stenosis at C4-5.   OTHER: None.     CT THORACIC SPINE:   PARASPINAL SOFT TISSUES: No significant abnormality.   ALIGNMENT:  No traumatic malalignment or traumatic facet widening.   VERTEBRAE: No suspicious/focal osseous lesion. No acute fracture. Vertebral body heights are maintained with minimal amount of degenerative endplate changes.   SPINAL CANAL/INTERVERTEBRAL DISCS: No high-grade spinal canal stenosis. Minimal amount of intervertebral disc height loss at T3-4.   VISUALIZED CHEST: No evidence of focal consolidation or significant abnormality. Coronary artery calcifications noted however exam is not optimized for evaluation.     CT LUMBAR SPINE:   PARASPINAL SOFT TISSUES: No significant abnormality.   ALIGNMENT:  No traumatic malalignment or traumatic facet widening.   VERTEBRAE: Minimal amount of wedging of the superior endplate of the L3 vertebral body, likely reflecting a degenerative Schmorl's node, faintly visualized on the prior PET-CT from 11/07/2023. Minimal amount of wedging/height loss of the anterior aspect of the L2 vertebral body could reflect an age-indeterminate compression fracture.  Vertebral body heights are maintained. No focal osseous abnormality.   SPINAL CANAL/INTERVERTEBRAL DISCS:   Varying degrees of degenerative disc  disease, most significant at L1-2 and L2-3 where there is mild intervertebral disc height loss and degenerative endplate changes. There is mild circumferential bulging of the intervertebral disc at L2-3 and ligamentum flavum hypertrophy contributing to mild spinal canal stenosis.   NEURAL FORAMINA:  Varying degrees of neural foraminal stenosis, most significant on the left at L4-5 where there is mild-to-moderate stenosis due to bulging of the intervertebral disc.   VISUALIZED ABDOMEN: Please refer to dedicated imaging of the abdomen and pelvis for further characterization.       1. No evidence of focal destructive osseous lesion to suggest metastatic disease throughout the visualized structures of the cervical, thoracic, lumbar spine. 2. Minimal amount of anterior wedging/height loss of the L2 vertebral body which could be degenerative in etiology however an age-indeterminate compression fracture is not excluded. 3. L3 anterior wedge deformity, new from 2012 and similar to 2023. 4. Varying degrees of degenerative changes of the spine as above. No critical canal stenosis.   I personally reviewed the images/study and I agree with the findings as stated by resident David Romero. This study was interpreted at [place], [place].   MACRO: None.   Signed by: Zoya Campo 6/19/2025 10:42 PM Dictation workstation:   [id number]    CT abdomen pelvis w IV contrast  Result Date: 6/19/2025  Interpreted By:  Zoya Campo and Ritchie Brandon STUDY: CT ABDOMEN PELVIS W IV CONTRAST;  6/19/2025 9:03 pm   INDICATION: Signs/Symptoms:abdominal pain.     Signs/Symptoms:abdominal pain. Past medical history of multiple myeloma.   COMPARISON: PET-CT 11/07/2023. Concurrent CT cervicothoracic and lumbar spine from 06/19/2025.   ACCESSION NUMBER(S): [id number]   ORDERING CLINICIAN: VIVIENNE LANE   TECHNIQUE: CT of the abdomen and pelvis was performed.  Standard contiguous axial  images were obtained at 3 mm slice thickness through the abdomen and pelvis. Coronal and sagittal reconstructions at 3 mm slice thickness were performed.  75 ML of Omnipaque 350 was administered intravenously without immediate complication.   FINDINGS: LOWER CHEST: The visualized lung base is unremarkable. The heart is normal in size without pericardial effusion. No pleural effusion is present. Visualized distal esophagus appears normal.   ABDOMEN:   LIVER: The liver is normal in size without evidence of focal liver lesions.   BILE DUCTS: The intrahepatic and extrahepatic ducts are not dilated.   GALLBLADDER: The gallbladder is present.   PANCREAS: The pancreas appears unremarkable without evidence of ductal dilatation or masses.   SPLEEN: The spleen is normal in size without focal lesions.   ADRENAL GLANDS: Bilateral adrenal glands appear normal.   KIDNEYS AND URETERS: The kidneys are normal in size and enhance symmetrically.  There are fluid density lesions throughout the bilateral kidneys,, largest in the interpolar region of the right kidney measuring 5.0 cm in greatest diameter (series 301, image 44) with additional reference lesion in the inferior pole of the left kidney measuring 1.4 cm (series 301, image 55). There are few scattered subcentimeter lesions of similar fluid density which are too small to characterized throughout the kidneys however favored to reflect benign renal cysts.   PELVIS:   BLADDER: The urinary bladder appears normal without abnormal wall thickening.   REPRODUCTIVE ORGANS: The prostate is not enlarged.   BOWEL: The stomach is unremarkable with some dependent layering hyperdense material. Small and large bowel are overall normal in caliber however there are mildly prominent fluid-filled loops of distal small bowel/proximal colon without evidence of marked surrounding wall thickening/inflammatory stranding. There is fluid throughout the colon with some mixed hyperdense layering material  in the rectal vault. Normal appendix.   VESSELS: There is no aneurysmal dilatation of the abdominal aorta. The IVC appears normal.   PERITONEUM/RETROPERITONEUM/LYMPH NODES: There is no free or loculated fluid collection, no free intraperitoneal air. The retroperitoneum appears normal.  No enlarged mesenteric lymph nodes.   BONES AND ABDOMINAL WALL: Please refer to dedicated imaging of the spine from 06/19/2025 for further characterization of osseous findings. There are no suspicious osseous lesions involving the appendicular skeleton or the osseous structures of the pelvis.   Small left fat containing inguinal hernia.       1.  Nonspecific mild prominent fluid-filled loops of distal small bowel and colon which can be seen in the setting of enterocolitis. There is some hyperdense layering fluid in the rectal vault and dependent aspect of the stomach which could reflect ingested hyperdense material or remote contrast. 2. Otherwise,  no evidence of acute process within the abdomen or pelvis. 3. Additional chronic and incidental findings as above including scattered low-density cystic lesions of the bilateral kidneys which are indeterminate however favored to reflect benign renal cysts. 4. Please refer to dedicated imaging of the spine 06/19/2025 for further characterization of osseous findings.   I personally reviewed the images/study and I agree with the findings as stated by resident David Romero. This study was interpreted at [place], [place].   MACRO: None   Signed by: Zoya Campo 6/19/2025 10:32 PM Dictation workstation:   [id number]      Assessment & Plan  Lower abdominal pain    Hypokalemia    GODFREY (acute kidney injury)    Abdominal pain    Weakness    Suspected UTI    CKD (chronic kidney disease)      Varinder Lizama is a 75 y.o. male with PMHx of IgG Lambda Multiple Myeloma, Stage 2 CKD, HTN, tobacco use disorder, PVD and schizophrenia who presents from the  [facility] ED after an appointment with his Oncologist this morning where he c/o abdominal pain and weakness.  He was found to have hypokalemia, Stage 2 GODFREY, and suspected UTI.  He is being admitted for further management.     ONC:  # IgG Lambda Multiple Myeloma  :: See above Oncology History  :: Revlimid has been on hold since 2/15/24 d/t cytopenias.  :: BMBx 2/22/24: Normocellular bone marrow with no definite morphologic or immunophenotypic evidence of plasma cell neoplasm.     Heme:  - Keep Hgb > 7 and Plt > 10  - DVT ppx: Heparin subcutaneous TID                 ID:  # Suspected UTI (UA +LE and WBC)  :: No leukocytosis or fever.  :: Urine Cx 6/19: negative   # Suspected Enterocolitis   :: CT A/P 6/19: Nonspecific mild prominent fluid-filled loops of distal small bowel and colon which can be seen in the setting of enterocolitis  - ID ppx: Continue home Dapsone 100 mg PO daily  - Stopped Rocephin 1 GM IV daily 6/20  - Start Cipro/Flagyl 6/20, IV converted to PO 6/21     FEN/GI/Renal:  Admit wt:  58.5 kg  F: LR @ 75 mL/hr         E:  Replete PRN           N:  Regular  GI prophylaxis:  Continue home Pepcid 40 mg PO daily  H/O Stage 2 CKD (baseline Cr ~ 1.00)  # Protein Calorie Malnutrition  # Unintentional Weight Loss  # Hypokalemia  # Stage 2 GODFREY likely r/t hypovolemia, improving   # Abdominal pain- resolved  :: Weight loss over the last year of ~ 20 lbs.  :: Abdominal pain resolved.  Patient denies N/V/D or constipation.  :: CT imaging as above.  - Start LR @ 75 mL/hr  - Continue home Colace 100 mg PO BID  - Start Zofran 4 mg PO or IV PRN nausea  - Start Bisacodyl 10 mg PO daily PRN constipation  - Consult dietician  # Hypermagnesemia, improving   - 4.31 this morning   - Urine electrolytes, TSH, Cortisol AM added on   - Renal US ordered   - Consulted nephrology, recs pending      NEURO:  H/O schizophrenia  # Generalized weakness  :: Tardive Dyskinesia noted on exam.  :: Patient states that he lives alone and does not  utilize assistive devices.  - Continue home Cogentin 1 mg PO daily  - Continue home Zyprexa 20 mg PO at bedtime  - Continue home Artane 1 mg PO at bedtime  - Consult PT     CV:  H/O HTN  :: No echocardiogram on file.  - EKG on admit  - Keep Mg+ > 2 and K+ > 4.0  - Continue home Norvasc 10 mg PO     PULM:  :: Oxygenating well on RA.  :: LS diminished in CODI and LLL.  - Encourage ambulation and IS q1 hour x 10 while awake        DISPO:  Code status:  Full Code  Lines/drains:  PIV x 2  Primary oncologist:  Dr. Cutler      Pt seen, discussed and examined with Dr Brandon Watkins, DO Marleny Welsh, APRN-CNP         [1] amLODIPine, 10 mg, oral, Daily  benztropine, 1 mg, oral, Daily  ciprofloxacin, 250 mg, oral, q12h DANNY  dapsone, 1 tablet, oral, Daily  docusate sodium, 100 mg, oral, BID  famotidine, 40 mg, oral, Nightly  heparin (porcine), 5,000 Units, subcutaneous, q8h  metroNIDAZOLE, 500 mg, oral, q12h DANNY  OLANZapine, 20 mg, oral, Nightly  potassium chloride CR, 40 mEq, oral, BID  trihexyphenidyl, 1 mg, oral, Nightly     [2]    [3] PRN medications: bisacodyl, ondansetron ODT **OR** ondansetron

## 2025-06-21 NOTE — CARE PLAN
Problem: Discharge Planning  Goal: Discharge to home or other facility with appropriate resources  Outcome: Progressing     Problem: Chronic Conditions and Co-morbidities  Goal: Patient's chronic conditions and co-morbidity symptoms are monitored and maintained or improved  Outcome: Progressing   The patient's goals for the shift include To be able to eat an rest    The clinical goals for the shift include Pt will remain HDS and free from injuries and falls

## 2025-06-21 NOTE — CONSULTS
"NEPHROLOGY NEW CONSULT NOTE   Varinder Lizama   75 y.o.    @WT@  MRN/Room: [id number]/[id number]/[id number]-A    Reason for consult: GODFREY    HPI:  Varinder Lizama is a 75 y.o. male with a PMHx of IgG Lambda Multiple Myeloma, CKD, HTN, tobacco use disorder, PVD and schizophrenia. Presented with Abdominal pain & Weakness, found to have GODFREY, Hypokalemia for which he was admitted. Nephrology consult for GODFREY, Hypokalemia, Hypermagnesemia.    On interview patient confirmed good oral intake (Doesn't like Water but drinks carbonated beverages). No NSIAD use.       In The ER: /68 (BP Location: Left arm, Patient Position: Lying)   Pulse 77   Temp 36.8 °C (98.2 °F) (Temporal)   Resp 18   Ht 1.727 m (5' 8\")   Wt 59.5 kg (131 lb 2.8 oz)   SpO2 95%   BMI 19.94 kg/m²      Medical History[1]   Surgical History[2]   Family History[3]  Social History     Socioeconomic History    Marital status: Single     Spouse name: Not on file    Number of children: Not on file    Years of education: Not on file    Highest education level: Not on file   Occupational History    Not on file   Tobacco Use    Smoking status: Never    Smokeless tobacco: Never   Vaping Use    Vaping status: Never Used   Substance and Sexual Activity    Alcohol use: Never    Drug use: Never    Sexual activity: Not on file   Other Topics Concern    Not on file   Social History Narrative    Not on file     Social Drivers of Health     Financial Resource Strain: Low Risk  (6/20/2025)    Overall Financial Resource Strain (CARDIA)     Difficulty of Paying Living Expenses: Not very hard   Recent Concern: Financial Resource Strain - Medium Risk (6/20/2025)    Overall Financial Resource Strain (CARDIA)     Difficulty of Paying Living Expenses: Somewhat hard   Food Insecurity: No Food Insecurity (6/20/2025)    Hunger Vital Sign     Worried About Running Out of Food in the Last Year: Never true     Ran Out of Food in the Last Year: Never true   Transportation Needs: No Transportation Needs " (6/20/2025)    PRAPARE - Transportation     Lack of Transportation (Medical): No     Lack of Transportation (Non-Medical): No   Physical Activity: Inactive (6/20/2025)    Exercise Vital Sign     Days of Exercise per Week: 0 days     Minutes of Exercise per Session: 0 min   Stress: Not on File (5/21/2021)    Received from [employer]    Stress     Stress: 0   Social Connections: Not on File (9/11/2024)    Received from [employer]    Social Connections     Connectedness: 0   Intimate Partner Violence: Not At Risk (6/20/2025)    Humiliation, Afraid, Rape, and Kick questionnaire     Fear of Current or Ex-Partner: No     Emotionally Abused: No     Physically Abused: No     Sexually Abused: No   Housing Stability: Low Risk  (6/20/2025)    Housing Stability Vital Sign     Unable to Pay for Housing in the Last Year: No     Number of Times Moved in the Last Year: 0     Homeless in the Last Year: No       Allergies[4]     Prescriptions Prior to Admission[5]     Meds:   amLODIPine, 10 mg, Daily  benztropine, 1 mg, Daily  ciprofloxacin, 250 mg, q12h DANNY  dapsone, 1 tablet, Daily  docusate sodium, 100 mg, BID  famotidine, 40 mg, Nightly  heparin (porcine), 5,000 Units, q8h  metroNIDAZOLE, 500 mg, q12h DANNY  OLANZapine, 20 mg, Nightly  potassium chloride CR, 40 mEq, BID  trihexyphenidyl, 1 mg, Nightly         bisacodyl, 10 mg, Daily PRN  ondansetron ODT, 4 mg, q8h PRN   Or  ondansetron, 4 mg, q8h PRN        Vitals:    06/21/25 1148   BP: 125/68   Pulse: 77   Resp: 18   Temp: 36.8 °C (98.2 °F)   SpO2: 95%        06/19 1900 - 06/21 0659  In: 1790 [P.O.:780; I.V.:560]  Out: 125 [Urine:125]   Weight change:      Comfortable  On RA  No SOB  Trace peripheral Edema      ASSESSMENT:  Varinder Lizama is a 75 y.o. male with a PMHx of IgG Lambda Multiple Myeloma, CKD, HTN, tobacco use disorder, PVD and schizophrenia. Presented with Abdominal pain & Weakness, found to have GODFREY, Hypokalemia for which he was admitted. Nephrology consult for GODFREY, Hypokalemia,  Hypermagnesemia.    #GODFREY  - Baseline Cr 1 (April 2025)  - U/A: Protein, WBC & RBC  - Urine Na <10, Cl <15  - CT with no obstruction?  - Possible hypovolemia Poor intake/GI Loss  - Marginal BP on presentation 100/60  - Contrast 6/19  - No NSAIDs  - Given LR  - Cause likely related to Hypovolemia/Hypoperfusion, Cast Nephropathy & ATI/ATN less likely given improving GODFREY    #Hypokalemia  - Urine K/Cr = 8  - Appropriate kidney response    #Hypermagnesemia  - Likely Related to Renal Impairment, Colitis  - Improving with improving eGFR    #Multiple Myeloma  - No treatment since Feb 2024  - Last regimen was Revlimid (Stopped due to Cytopenias)    #Enterocolitis    Recommendations:  - No indication for RRT on assessment  - Already improving  - Replace K carefully      Erika Sousa MD  Nephrology Fellow  24 hour Renal Pager - [phone]       [1]   Past Medical History:  Diagnosis Date    Age-related nuclear cataract, bilateral 11/23/2016    Age-related nuclear cataract of both eyes    Angioneurotic edema, initial encounter 04/07/2015    ACE inhibitor-aggravated angioedema    CKD (chronic kidney disease)     Cortical age-related cataract, bilateral 11/23/2016    Cortical age-related cataract of both eyes    Food insecurity 05/19/2022    Food insecurity    HTN (hypertension)     Keratoconjunctivitis due to adenovirus 08/04/2016    EKC (epidemic keratoconjunctivitis)    Multiple myeloma     Posterior subcapsular polar age-related cataract, right eye 10/10/2016    Posterior subcapsular polar age-related cataract of right eye    Schizophrenia     Viral conjunctivitis, unspecified 10/10/2016    Acute viral conjunctivitis of both eyes   [2]   Past Surgical History:  Procedure Laterality Date    OTHER SURGICAL HISTORY  07/28/2017    Prior Surgical Procedure Not Done   [3]   Family History  Problem Relation Name Age of Onset    Heart attack Mother      Heart attack Father     [4]   Allergies  Allergen Reactions    Lenalidomide  Unknown    Lisinopril Angioedema   [5]   Medications Prior to Admission   Medication Sig Dispense Refill Last Dose/Taking    amLODIPine (Norvasc) 10 mg tablet Take 1 tablet (10 mg) by mouth once daily. 30 tablet 2 6/18/2025 Evening    dapsone 100 mg tablet Take 1 tablet (100 mg) by mouth once daily.   6/19/2025 Morning    docusate sodium (Colace) 100 mg capsule Take 1 capsule (100 mg) by mouth 2 times a day. 60 capsule 1 6/18/2025 Bedtime    famotidine (Pepcid) 40 mg tablet Take 1 tablet (40 mg) by mouth once daily at bedtime. 90 tablet 1 6/18/2025 Bedtime    ketoconazole (NIZOral) 2 % cream Apply topically once daily. To affected areas.   Past Week    OLANZapine (ZyPREXA) 20 mg tablet Take 1 tablet (20 mg) by mouth once daily at bedtime.   6/18/2025 Bedtime    trihexyphenidyl (Artane) 2 mg tablet Take 0.5 tablets (1 mg) by mouth once daily at bedtime.   6/18/2025 Bedtime

## 2025-06-22 ENCOUNTER — DOCUMENTATION (OUTPATIENT)
Dept: HOME HEALTH SERVICES | Facility: HOME HEALTH | Age: 75
End: 2025-06-22
Payer: MEDICARE

## 2025-06-22 VITALS
HEART RATE: 80 BPM | RESPIRATION RATE: 18 BRPM | OXYGEN SATURATION: 93 % | DIASTOLIC BLOOD PRESSURE: 72 MMHG | BODY MASS INDEX: 20.28 KG/M2 | SYSTOLIC BLOOD PRESSURE: 135 MMHG | HEIGHT: 68 IN | TEMPERATURE: 97.7 F | WEIGHT: 133.82 LBS

## 2025-06-22 LAB
ALBUMIN SERPL BCP-MCNC: 3.9 G/DL (ref 3.4–5)
ANION GAP SERPL CALC-SCNC: 13 MMOL/L (ref 10–20)
BASOPHILS # BLD AUTO: 0.01 X10*3/UL (ref 0–0.1)
BASOPHILS NFR BLD AUTO: 0.2 %
BUN SERPL-MCNC: 21 MG/DL (ref 6–23)
CALCIUM SERPL-MCNC: 8.8 MG/DL (ref 8.6–10.6)
CHLORIDE SERPL-SCNC: 106 MMOL/L (ref 98–107)
CO2 SERPL-SCNC: 29 MMOL/L (ref 21–32)
CREAT SERPL-MCNC: 1.17 MG/DL (ref 0.5–1.3)
EGFRCR SERPLBLD CKD-EPI 2021: 66 ML/MIN/1.73M*2
EOSINOPHIL # BLD AUTO: 0.04 X10*3/UL (ref 0–0.4)
EOSINOPHIL NFR BLD AUTO: 0.7 %
ERYTHROCYTE [DISTWIDTH] IN BLOOD BY AUTOMATED COUNT: 14.1 % (ref 11.5–14.5)
GLUCOSE SERPL-MCNC: 108 MG/DL (ref 74–99)
HCT VFR BLD AUTO: 39.8 % (ref 41–52)
HGB BLD-MCNC: 12.2 G/DL (ref 13.5–17.5)
IMM GRANULOCYTES # BLD AUTO: 0.02 X10*3/UL (ref 0–0.5)
IMM GRANULOCYTES NFR BLD AUTO: 0.4 % (ref 0–0.9)
LYMPHOCYTES # BLD AUTO: 1.72 X10*3/UL (ref 0.8–3)
LYMPHOCYTES NFR BLD AUTO: 32.1 %
MAGNESIUM SERPL-MCNC: 2.14 MG/DL (ref 1.6–2.4)
MCH RBC QN AUTO: 25.8 PG (ref 26–34)
MCHC RBC AUTO-ENTMCNC: 30.7 G/DL (ref 32–36)
MCV RBC AUTO: 84 FL (ref 80–100)
MONOCYTES # BLD AUTO: 0.35 X10*3/UL (ref 0.05–0.8)
MONOCYTES NFR BLD AUTO: 6.5 %
NEUTROPHILS # BLD AUTO: 3.21 X10*3/UL (ref 1.6–5.5)
NEUTROPHILS NFR BLD AUTO: 60.1 %
NRBC BLD-RTO: 0 /100 WBCS (ref 0–0)
PHOSPHATE SERPL-MCNC: 2.2 MG/DL (ref 2.5–4.9)
PLATELET # BLD AUTO: 168 X10*3/UL (ref 150–450)
POTASSIUM SERPL-SCNC: 4.4 MMOL/L (ref 3.5–5.3)
RBC # BLD AUTO: 4.72 X10*6/UL (ref 4.5–5.9)
SODIUM SERPL-SCNC: 144 MMOL/L (ref 136–145)
WBC # BLD AUTO: 5.4 X10*3/UL (ref 4.4–11.3)

## 2025-06-22 PROCEDURE — 36415 COLL VENOUS BLD VENIPUNCTURE: CPT

## 2025-06-22 PROCEDURE — 83735 ASSAY OF MAGNESIUM: CPT

## 2025-06-22 PROCEDURE — 2500000001 HC RX 250 WO HCPCS SELF ADMINISTERED DRUGS (ALT 637 FOR MEDICARE OP): Performed by: NURSE PRACTITIONER

## 2025-06-22 PROCEDURE — 99238 HOSP IP/OBS DSCHRG MGMT 30/<: CPT | Performed by: STUDENT IN AN ORGANIZED HEALTH CARE EDUCATION/TRAINING PROGRAM

## 2025-06-22 PROCEDURE — 80069 RENAL FUNCTION PANEL: CPT

## 2025-06-22 PROCEDURE — 85025 COMPLETE CBC W/AUTO DIFF WBC: CPT

## 2025-06-22 PROCEDURE — 2500000001 HC RX 250 WO HCPCS SELF ADMINISTERED DRUGS (ALT 637 FOR MEDICARE OP)

## 2025-06-22 RX ORDER — METRONIDAZOLE 500 MG/1
500 TABLET ORAL EVERY 12 HOURS SCHEDULED
Qty: 20 TABLET | Refills: 0 | Status: SHIPPED | OUTPATIENT
Start: 2025-06-22 | End: 2025-07-02

## 2025-06-22 RX ORDER — BENZTROPINE MESYLATE 1 MG/1
1 TABLET ORAL DAILY
Start: 2025-06-22

## 2025-06-22 RX ORDER — CIPROFLOXACIN 250 MG/1
250 TABLET, FILM COATED ORAL EVERY 12 HOURS SCHEDULED
Qty: 20 TABLET | Refills: 0 | Status: SHIPPED | OUTPATIENT
Start: 2025-06-22 | End: 2025-07-02

## 2025-06-22 RX ADMIN — DAPSONE 100 MG: 100 TABLET ORAL at 08:35

## 2025-06-22 RX ADMIN — AMLODIPINE BESYLATE 10 MG: 10 TABLET ORAL at 08:34

## 2025-06-22 RX ADMIN — BENZTROPINE MESYLATE 1 MG: 1 TABLET ORAL at 08:35

## 2025-06-22 RX ADMIN — DOCUSATE SODIUM 100 MG: 100 CAPSULE, LIQUID FILLED ORAL at 08:35

## 2025-06-22 RX ADMIN — CIPROFOLXACIN 250 MG: 500 TABLET ORAL at 08:34

## 2025-06-22 RX ADMIN — METRONIDAZOLE 500 MG: 500 TABLET ORAL at 08:35

## 2025-06-22 ASSESSMENT — COGNITIVE AND FUNCTIONAL STATUS - GENERAL
HELP NEEDED FOR BATHING: A LITTLE
DAILY ACTIVITIY SCORE: 23
MOBILITY SCORE: 24

## 2025-06-22 ASSESSMENT — PAIN SCALES - GENERAL: PAINLEVEL_OUTOF10: 0 - NO PAIN

## 2025-06-22 ASSESSMENT — ACTIVITIES OF DAILY LIVING (ADL): LACK_OF_TRANSPORTATION: NO

## 2025-06-22 NOTE — HH CARE COORDINATION
Home Care received a referral for Physical Therapy, Occupational Therapy, and Home Health Aide. Unfortunately, we are unable to accept and process the referral at this time.    Reason:  Inability to accommodate the patient's needs in a safe and timely manner    Patients, please reach out to the referring provider or your PCP to assist in obtaining an alternative home care agency and/or guidance to meet your needs.    Providers, please reach out to  Home Care at [phone] with any questions regarding the declined referral.

## 2025-06-22 NOTE — PROGRESS NOTES
06/22/25 1300   Discharge Planning   Living Arrangements Alone   Support Systems Friends/neighbors   Type of Residence Private residence   Number of Stairs to Enter Residence 0   Number of Stairs Within Residence 0   Do you have animals or pets at home? No   Who is requesting discharge planning? Patient   Home or Post Acute Services In home services   Type of Home Care Services Home PT   Expected Discharge Disposition Home H   Financial Resource Strain   How hard is it for you to pay for the very basics like food, housing, medical care, and heating? Not very   Housing Stability   In the last 12 months, was there a time when you were not able to pay the mortgage or rent on time? N   In the past 12 months, how many times have you moved where you were living? 0   At any time in the past 12 months, were you homeless or living in a shelter (including now)? N   Transportation Needs   In the past 12 months, has lack of transportation kept you from medical appointments or from getting medications? no   In the past 12 months, has lack of transportation kept you from meetings, work, or from getting things needed for daily living? No     Social work following to assist with discharge planning.  Pt recommended for home care and previously selected [facility].   SW received message that [facility] is unable to staff.  Request sent to med team for external home care. ELI will initiate external referrals when order received.  Elizabeth Gunsalus LISW-S  Care Transitions Supervisor    Addendum:  External home care order received and referrals sent. Will advise of accepting provider when known.  Elizabeth Gunsalus LISW-S Care Transitions Supervisor    Addendum:  [facility] is able to accept.  Med team advised.  ELI attempted to call pt, but pts number is disconnected.  ts friends number is the same at pts.  SW messaged med team to request working number.  SW to advise when active contact info received.  Elizabeth Gunsalus LISW-S Care  Transitions Supervisor

## 2025-06-22 NOTE — CARE PLAN
Discharge orders placed by MELODIE. AVS printed and handed to pt. AVS reviewed with pt and family at bedside, all questions answered. PIV flushed and removed per unit protocol. Pt discharged from clinic stable and ambulatory; accompanied by family.       Problem: Discharge Planning  Goal: Discharge to home or other facility with appropriate resources  Outcome: Met     Problem: Chronic Conditions and Co-morbidities  Goal: Patient's chronic conditions and co-morbidity symptoms are monitored and maintained or improved  Outcome: Met     Problem: Nutrition  Goal: Nutrient intake appropriate for maintaining nutritional needs  Outcome: Met     Problem: Skin  Goal: Decreased wound size/increased tissue granulation at next dressing change  Outcome: Met  Goal: Participates in plan/prevention/treatment measures  Outcome: Met  Goal: Prevent/manage excess moisture  Outcome: Met  Goal: Prevent/minimize sheer/friction injuries  Outcome: Met  Goal: Promote/optimize nutrition  Outcome: Met  Goal: Promote skin healing  Outcome: Met   The patient's goals for the shift include To be able to eat an rest    The clinical goals for the shift include Pt will remain HDS and free from falls

## 2025-06-22 NOTE — DISCHARGE SUMMARY
Discharge Diagnosis  Lower abdominal pain    Issues Requiring Follow-Up  Please address home PT/OT. Pt does not have a working phone number and home care was unable to get a hold of the patient      Test Results Pending At Discharge  Pending Labs       No current pending labs.            Hospital Course  Varinder Lizama is a 75 y.o. male with PMHx of IgG Lambda Multiple Myeloma, Stage 2 CKD, HTN, tobacco use disorder, PVD and schizophrenia who presents from the [facility] ED after an appointment with his Oncologist this morning where he c/o abdominal pain and weakness. He was found to have hypokalemia, Stage 2 GODFREY, and suspected UTI. He is being admitted for further management.     Hospital course notable for:  - Hypokalemia: Treated with replacement and stable on discharge     - Hypermagnesemia: Resolved     - GODFREY: Resolved with IVF     - Suspected Enterocolitis: Started Cipro/Flagyl. Abdominal pain improved and was sent home on po abx (sent to home pharmacy, [facility])    ACCESS: None  PRIMARY ONC: Dr Cutler   PPX: Dapsone  FOLLOW UP:   - Mal Heme clinic and infusion 6/24 (requested)       Visit Vitals  /72 (BP Location: Right arm, Patient Position: Sitting)   Pulse 80   Temp 36.5 °C (97.7 °F) (Temporal)   Resp 18     Vitals:    06/22/25 0751   Weight: 60.7 kg (133 lb 13.1 oz)       Immunization History   Administered Date(s) Administered    Flu vaccine, trivalent, preservative free, HIGH-DOSE, age 65y+ (Fluzone) 11/10/2016, 10/05/2017, 10/11/2018, 10/24/2019    Flu vaccine, trivalent, preservative free, age 6 months and greater (Fluarix/Fluzone/Flulaval) 11/21/2013, 09/29/2015    Influenza Whole 11/26/2002    Influenza, Unspecified 10/18/2012    Influenza, seasonal, injectable 10/25/2011, 10/18/2022    Moderna SARS-CoV-2 Vaccination 03/12/2021, 04/09/2021, 08/27/2021    Pneumococcal conjugate vaccine, 13-valent (PREVNAR 13) 02/25/2019    Pneumococcal polysaccharide vaccine, 23-valent, age 2 years and older (PNEUMOVAX 23)  09/07/2017    Td (adult) 01/01/1993       Results        Pertinent Physical Exam At Time of Discharge  Physical Exam    Home Medications     Medication List      START taking these medications     ciprofloxacin 250 mg tablet; Commonly known as: Cipro; Take 1 tablet   (250 mg) by mouth every 12 hours for 10 days.   metroNIDAZOLE 500 mg tablet; Commonly known as: Flagyl; Take 1 tablet   (500 mg) by mouth every 12 hours for 10 days.     CHANGE how you take these medications     benztropine 1 mg tablet; Commonly known as: Cogentin; Take 1 tablet (1   mg) by mouth once daily.; What changed: how much to take, when to take   this     CONTINUE taking these medications     amLODIPine 10 mg tablet; Commonly known as: Norvasc; Take 1 tablet (10   mg) by mouth once daily.   dapsone 100 mg tablet   docusate sodium 100 mg capsule; Commonly known as: Colace; Take 1   capsule (100 mg) by mouth 2 times a day.   famotidine 40 mg tablet; Commonly known as: Pepcid; Take 1 tablet (40   mg) by mouth once daily at bedtime.   ketoconazole 2 % cream; Commonly known as: NIZOral   OLANZapine 20 mg tablet; Commonly known as: ZyPREXA   trihexyphenidyl 2 mg tablet; Commonly known as: Artane       Outpatient Follow-Up  No future appointments.    Marleny Welsh, APRN-CNP

## 2025-06-22 NOTE — HOSPITAL COURSE
Varinder Lizama is a 75 y.o. male with PMHx of IgG Lambda Multiple Myeloma, Stage 2 CKD, HTN, tobacco use disorder, PVD and schizophrenia who presents from the [facility] ED after an appointment with his Oncologist this morning where he c/o abdominal pain and weakness. He was found to have hypokalemia, Stage 2 GODFREY, and suspected UTI. He is being admitted for further management.     Hospital course notable for:  - Hypokalemia: Treated with replacement and stable on discharge     - Hypermagnesemia: Resolved     - GODFREY: Resolved with IVF     - Suspected Enterocolitis: Started Cipro/Flagyl. Abdominal pain improved and was sent home on po abx (sent to home pharmacy, [facility])    ACCESS: None  PRIMARY ONC: Dr Cutler   PPX: Dapsone  FOLLOW UP:   - Mal Heme clinic and infusion 6/24 (requested)

## 2025-06-23 LAB
ALBUMIN: 4.4 G/DL (ref 3.4–5)
ALPHA 1 GLOBULIN: 0.3 G/DL (ref 0.2–0.6)
ALPHA 2 GLOBULIN: 1 G/DL (ref 0.4–1.1)
BETA GLOBULIN: 0.8 G/DL (ref 0.5–1.2)
GAMMA GLOBULIN: 1.1 G/DL (ref 0.5–1.4)
IMMUNOFIXATION COMMENT: NORMAL
PATH REVIEW - SERUM IMMUNOFIXATION: NORMAL
PATH REVIEW-SERUM PROTEIN ELECTROPHORESIS: NORMAL
PROTEIN ELECTROPHORESIS COMMENT: NORMAL

## 2025-06-24 LAB
ATRIAL RATE: 87 BPM
P AXIS: 55 DEGREES
P OFFSET: 192 MS
P ONSET: 142 MS
PR INTERVAL: 152 MS
Q ONSET: 218 MS
QRS COUNT: 14 BEATS
QRS DURATION: 80 MS
QT INTERVAL: 364 MS
QTC CALCULATION(BAZETT): 438 MS
QTC FREDERICIA: 411 MS
R AXIS: 62 DEGREES
T AXIS: 37 DEGREES
T OFFSET: 400 MS
VENTRICULAR RATE: 87 BPM

## 2025-06-25 NOTE — DOCUMENTATION CLARIFICATION NOTE
"    PATIENT:               KESHA MUNIZ  ACCT #:                  [id number]  MRN:                       [id number]  :                       1950  ADMIT DATE:       2025 4:49 PM  DISCH DATE:        2025 3:30 PM  RESPONDING PROVIDER #:        [id number]          PROVIDER RESPONSE TEXT:    I agree with the Dietician's diagnosis of severe malnutrition on 25    CDI QUERY TEXT:    Clarification    Instruction:    Based on your assessment of the patient and the clinical information, please provide the requested documentation by clicking on the appropriate radio button and enter any additional information if prompted.    Question: Please further clarify this patient's nutritional status as    When answering this query, please exercise your independent professional judgment. The fact that a question is being asked, does not imply that any particular answer is desired or expected.    The patient's clinical indicators include:  Clinical Information:  25 Dietician Consult Note:  \"Patient is a 75 y.o. male with Multiple Myeloma, CKD2, and Schizophrenia, admitted for further work-up of abdominal pains and weakness. Found to have UTI, hypokalemia, and UTI.\"    Clinical Indicators:  25 Dietician Consult Note:  BMI-18.84  \"Nutrition Focused Physical Exam Findings:  Subcutaneous Fat Loss:  - Orbital Fat Pads: Severe (dark circles, hollowing and loose skin)  - Buccal Fat Pads: Severe (hollow, sunken and narrow face)  - Triceps: Severe (negligible fat tissue)  - Ribs: Defer  Muscle Wasting:  - Temporalis: Severe (hollowed scooping depression)  - Pectoralis (Clavicular Region): Severe (protruding prominent clavicle)  - Deltoid/Trapezius: Severe (squared shoulders, acromion process prominent)  - Interosseous: Severe (depressed area between thumb and forefinger)    \"Malnutrition Diagnosis: Severe malnutrition related to chronic disease or condition\"  Related to: CA  As Evidenced by: pt with areas of severe muscle " "and fat losses, likely consuming <75% estimated energy needs x >1 month\"    Treatment: Monitor vital signs/labs/weight; medical food supplement    Risk Factors: Multiple Myeloma, severe muscle/fat losses, BMI-18.84; no tobacco/alcohol/drug use per the 6/18/25 H&P  Options provided:  -- I agree with the Dietician's diagnosis of severe malnutrition on 6/20/25  -- No nutritional deficiency  -- Other - I will add my own diagnosis  -- Refer to Clinical Documentation Reviewer    Query created by: Savana Chaudhari on 6/23/2025 2:07 PM      Electronically signed by:  SAJAN LEE DO 6/25/2025 1:12 PM          "

## 2025-06-26 DIAGNOSIS — K59.00 CONSTIPATION, UNSPECIFIED CONSTIPATION TYPE: ICD-10-CM

## 2025-06-26 RX ORDER — DOCUSATE SODIUM 100 MG/1
100 CAPSULE, LIQUID FILLED ORAL 2 TIMES DAILY
Qty: 60 CAPSULE | Refills: 1 | OUTPATIENT
Start: 2025-06-26

## 2025-07-03 DIAGNOSIS — C90.00 MULTIPLE MYELOMA, REMISSION STATUS UNSPECIFIED (MULTI): ICD-10-CM

## 2025-07-08 ENCOUNTER — APPOINTMENT (OUTPATIENT)
Dept: HEMATOLOGY/ONCOLOGY | Facility: HOSPITAL | Age: 75
End: 2025-07-08
Payer: MEDICARE

## 2025-07-10 ENCOUNTER — LAB (OUTPATIENT)
Dept: LAB | Facility: HOSPITAL | Age: 75
End: 2025-07-10
Payer: MEDICARE

## 2025-07-10 ENCOUNTER — OFFICE VISIT (OUTPATIENT)
Dept: HEMATOLOGY/ONCOLOGY | Facility: HOSPITAL | Age: 75
End: 2025-07-10
Payer: MEDICARE

## 2025-07-10 VITALS
TEMPERATURE: 97 F | SYSTOLIC BLOOD PRESSURE: 164 MMHG | HEART RATE: 73 BPM | BODY MASS INDEX: 19.86 KG/M2 | OXYGEN SATURATION: 100 % | DIASTOLIC BLOOD PRESSURE: 81 MMHG | WEIGHT: 130.6 LBS | RESPIRATION RATE: 14 BRPM

## 2025-07-10 DIAGNOSIS — K59.00 CONSTIPATION, UNSPECIFIED CONSTIPATION TYPE: ICD-10-CM

## 2025-07-10 DIAGNOSIS — C90.00 MULTIPLE MYELOMA, REMISSION STATUS UNSPECIFIED (MULTI): ICD-10-CM

## 2025-07-10 DIAGNOSIS — I10 PRIMARY HYPERTENSION: ICD-10-CM

## 2025-07-10 LAB
ALBUMIN SERPL BCP-MCNC: 4.2 G/DL (ref 3.4–5)
ALP SERPL-CCNC: 50 U/L (ref 33–136)
ALT SERPL W P-5'-P-CCNC: 10 U/L (ref 10–52)
ANION GAP SERPL CALC-SCNC: 11 MMOL/L (ref 10–20)
AST SERPL W P-5'-P-CCNC: 13 U/L (ref 9–39)
BASOPHILS # BLD AUTO: 0.01 X10*3/UL (ref 0–0.1)
BASOPHILS NFR BLD AUTO: 0.2 %
BILIRUB SERPL-MCNC: 0.6 MG/DL (ref 0–1.2)
BUN SERPL-MCNC: 17 MG/DL (ref 6–23)
CALCIUM SERPL-MCNC: 9.3 MG/DL (ref 8.6–10.3)
CHLORIDE SERPL-SCNC: 104 MMOL/L (ref 98–107)
CO2 SERPL-SCNC: 28 MMOL/L (ref 21–32)
CREAT SERPL-MCNC: 0.87 MG/DL (ref 0.5–1.3)
EGFRCR SERPLBLD CKD-EPI 2021: >90 ML/MIN/1.73M*2
EOSINOPHIL # BLD AUTO: 0.02 X10*3/UL (ref 0–0.4)
EOSINOPHIL NFR BLD AUTO: 0.5 %
ERYTHROCYTE [DISTWIDTH] IN BLOOD BY AUTOMATED COUNT: 14.6 % (ref 11.5–14.5)
GLUCOSE SERPL-MCNC: 114 MG/DL (ref 74–99)
HCT VFR BLD AUTO: 38.5 % (ref 41–52)
HGB BLD-MCNC: 12.5 G/DL (ref 13.5–17.5)
IGA SERPL-MCNC: 137 MG/DL (ref 70–400)
IGG SERPL-MCNC: 1180 MG/DL (ref 700–1600)
IGM SERPL-MCNC: 65 MG/DL (ref 40–230)
IMM GRANULOCYTES # BLD AUTO: 0.01 X10*3/UL (ref 0–0.5)
IMM GRANULOCYTES NFR BLD AUTO: 0.2 % (ref 0–0.9)
LYMPHOCYTES # BLD AUTO: 1.28 X10*3/UL (ref 0.8–3)
LYMPHOCYTES NFR BLD AUTO: 29.9 %
MCH RBC QN AUTO: 26.8 PG (ref 26–34)
MCHC RBC AUTO-ENTMCNC: 32.5 G/DL (ref 32–36)
MCV RBC AUTO: 82 FL (ref 80–100)
MONOCYTES # BLD AUTO: 0.24 X10*3/UL (ref 0.05–0.8)
MONOCYTES NFR BLD AUTO: 5.6 %
NEUTROPHILS # BLD AUTO: 2.72 X10*3/UL (ref 1.6–5.5)
NEUTROPHILS NFR BLD AUTO: 63.6 %
NRBC BLD-RTO: 0 /100 WBCS (ref 0–0)
PLATELET # BLD AUTO: 189 X10*3/UL (ref 150–450)
POTASSIUM SERPL-SCNC: 3.4 MMOL/L (ref 3.5–5.3)
PROT SERPL-MCNC: 6.8 G/DL (ref 6.4–8.2)
PROT SERPL-MCNC: 7.1 G/DL (ref 6.4–8.2)
RBC # BLD AUTO: 4.67 X10*6/UL (ref 4.5–5.9)
SODIUM SERPL-SCNC: 140 MMOL/L (ref 136–145)
WBC # BLD AUTO: 4.3 X10*3/UL (ref 4.4–11.3)

## 2025-07-10 PROCEDURE — 36415 COLL VENOUS BLD VENIPUNCTURE: CPT

## 2025-07-10 PROCEDURE — 82784 ASSAY IGA/IGD/IGG/IGM EACH: CPT

## 2025-07-10 PROCEDURE — 80053 COMPREHEN METABOLIC PANEL: CPT

## 2025-07-10 PROCEDURE — 3077F SYST BP >= 140 MM HG: CPT

## 2025-07-10 PROCEDURE — 99215 OFFICE O/P EST HI 40 MIN: CPT

## 2025-07-10 PROCEDURE — 83521 IG LIGHT CHAINS FREE EACH: CPT

## 2025-07-10 PROCEDURE — 1126F AMNT PAIN NOTED NONE PRSNT: CPT

## 2025-07-10 PROCEDURE — 86334 IMMUNOFIX E-PHORESIS SERUM: CPT

## 2025-07-10 PROCEDURE — 85025 COMPLETE CBC W/AUTO DIFF WBC: CPT

## 2025-07-10 PROCEDURE — 84155 ASSAY OF PROTEIN SERUM: CPT | Mod: 59

## 2025-07-10 PROCEDURE — 1111F DSCHRG MED/CURRENT MED MERGE: CPT

## 2025-07-10 PROCEDURE — 3079F DIAST BP 80-89 MM HG: CPT

## 2025-07-10 RX ORDER — AMLODIPINE BESYLATE 10 MG/1
10 TABLET ORAL DAILY
Qty: 30 TABLET | Refills: 2 | Status: SHIPPED | OUTPATIENT
Start: 2025-07-10

## 2025-07-10 RX ORDER — DAPSONE 100 MG/1
1 TABLET ORAL DAILY
Qty: 30 TABLET | Refills: 0 | Status: SHIPPED | OUTPATIENT
Start: 2025-07-10

## 2025-07-10 RX ORDER — FAMOTIDINE 40 MG/1
40 TABLET, FILM COATED ORAL NIGHTLY
Qty: 90 TABLET | Refills: 1 | Status: SHIPPED | OUTPATIENT
Start: 2025-07-10

## 2025-07-10 RX ORDER — DOCUSATE SODIUM 100 MG/1
100 CAPSULE, LIQUID FILLED ORAL 2 TIMES DAILY
Qty: 60 CAPSULE | Refills: 1 | Status: SHIPPED | OUTPATIENT
Start: 2025-07-10

## 2025-07-10 ASSESSMENT — ENCOUNTER SYMPTOMS
PSYCHIATRIC NEGATIVE: 1
CONSTITUTIONAL NEGATIVE: 1
CARDIOVASCULAR NEGATIVE: 1
ENDOCRINE NEGATIVE: 1
RESPIRATORY NEGATIVE: 1
HEMATOLOGIC/LYMPHATIC NEGATIVE: 1
CONSTIPATION: 1
EYES NEGATIVE: 1

## 2025-07-10 ASSESSMENT — PAIN SCALES - GENERAL: PAINLEVEL_OUTOF10: 0-NO PAIN

## 2025-07-10 NOTE — PROGRESS NOTES
Patient ID: Varinder Lizama is a 75 y.o. male.    Oncology History Overview Note   Smoldering multiple myeloma, ISS stage II:  Characterized by IgG lambda-restricted paraprotein, initially 2.9 g/dL without evidence of end-organ damage. No lytic bone lesions. No anemia. No hypercalcemia. No renal dysfunction. 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 His kappa lambda light chains are low risk. He also has microcytosis likely secondary to alpha-thalassemia  versus iron deficiency.     Symptomatic IgG Lambda Multiple Myeloma ISS stage II   Characterized by anemia noted in 8/2016    Lenalidomide (25 mg daily 21 days on 7 days off) plus Dexamethasone (40 mg once weekly) started on 9/15/2016, stopped briefly due to rash restarted on 12/17/2016    Started maintenance Lenalidomide 10 mg  5/2017 every other day decreasing to 2.5 mg daily 12/2017.    PET/CT (11/7/23):   There is no hypermetabolic activity to suggest active focal  neoplastic process. No increased FDG uptake in the right scapula to  suggest disease involvement.    Continued pancytopenia noted through 2023. Revlimid dose reduced to 2.5mg 3 weeks on, 1 week off in 1/2024.     Held Revlimid as of 2/2024 due to continued cytopenias.    -- NORMOCELLULAR BONE MARROW WITH  MATURING TRILINEAGE HEMATOPOESIS AND GRANULOCYTIC HYPOPLASIA, SEE NOTE  -- NO DEFINITE MORPHOLOGIC OR IMMUNOPHENOTYPIC EVIDENCE OF PLASMA MAYA NEOPLASM     NOTE: Granulopoiesis appears decreased but the granulocytes present show complete maturation. The finding is of unclear etiology. Clinical correlation and correlation with pending genetic/molecular studies suggested.  Decreased iron storage    - Continue to hold revlimid at this time        Multiple myeloma   10/20/2023 Initial Diagnosis    Multiple myeloma (CMS/HCC)         SUBJECTIVE     2/15/24: Revlimid held d/t  cytopenia  3/28/24: No acute complaints at this time. Snow is concerned about his worsening memory loss and needing more help with daily ADL.  6/27/24: Varinder reports feeling well today. He states his energy is good and he is eating well. His friend and caretaker, Snow, feels that he needs more assistance at home with cooking/ bathing/ ADLs. Denies n/v/d/c, weight loss, fever, chill, dizziness, night sweats.   11/14/24: Reports feeling well today. Notes he has difficulty moving around. Feels he is constipated occasionally but has BM approximately every other day. Denies fevers, pain, SOB, swelling, weight loss, night sweats, neuropathy, bruising  1/9/25: reports feeling well today, struggles with constipation but has not been using stool softener. Encouraged to use colace BID. SW present for visit today to discuss referral to PACE program. Denies fevers, n/v/d/c, rash, lymphadenopathy, new pain, SOB, chest pain, edema.  2/20/25: no c/o. Came with his long time friend.   4/24/25: Varinder presents to clinic today for follow up, accompanied by his friend, Snow. Varinder reports feeling well overall today, but Snow notes that Varinder is walking slower than usual. Varinder endorses that he feels a little weaker than last visit. Denies pain, numbness, falls, new bony pain, unintentional weight loss, fevers, night sweats, chest pain, edema, SOB.   6/19/25: c/o constipation. Poor appetite. Denies any recent f/c/n/v  7/10/25: presents accompanied by his friend Snow, reports feeling well, no new concerns       Review of Systems   Constitutional: Negative.    HENT:  Negative.     Eyes: Negative.    Respiratory: Negative.     Cardiovascular: Negative.    Gastrointestinal:  Positive for constipation.   Endocrine: Negative.    Genitourinary: Negative.     Skin: Negative.    Hematological: Negative.    Psychiatric/Behavioral: Negative.     All other systems reviewed and are negative.        OBJECTIVE   BSA: 1.69 meters squared    /81 (BP  Location: Right arm, Patient Position: Sitting, BP Cuff Size: Adult)   Pulse 73   Temp 36.1 °C (97 °F) (Skin)   Resp 14   Wt 59.2 kg (130 lb 9.6 oz)   SpO2 100%   BMI 19.86 kg/m²   Vital signs reviewed in flowsheets.   Physical Exam  Constitutional:       Appearance: He is normal weight.   HENT:      Head: Normocephalic and atraumatic.      Mouth/Throat:      Mouth: Mucous membranes are dry.      Comments: brown plaque on tongue  Eyes:      Extraocular Movements: Extraocular movements intact.      Conjunctiva/sclera: Conjunctivae normal.      Pupils: Pupils are equal, round, and reactive to light.   Cardiovascular:      Pulses: Normal pulses.      Heart sounds: Normal heart sounds.   Pulmonary:      Effort: Pulmonary effort is normal.      Breath sounds: Normal breath sounds.   Abdominal:      General: Abdomen is flat. Bowel sounds are normal.      Palpations: Abdomen is soft.   Musculoskeletal:         General: Normal range of motion.      Cervical back: Normal range of motion and neck supple.   Skin:     General: Skin is warm and dry.   Neurological:      Mental Status: He is alert. Mental status is at baseline.         Performance Status:  Karnofsky Score: 60 - Requires occasional assistance, but is able care for most of personal needs    Past Medical History:   Diagnosis Date    Age-related nuclear cataract, bilateral 11/23/2016    Age-related nuclear cataract of both eyes    Angioneurotic edema, initial encounter 04/07/2015    ACE inhibitor-aggravated angioedema    CKD (chronic kidney disease)     Cortical age-related cataract, bilateral 11/23/2016    Cortical age-related cataract of both eyes    Food insecurity 05/19/2022    Food insecurity    HTN (hypertension)     Keratoconjunctivitis due to adenovirus 08/04/2016    EKC (epidemic keratoconjunctivitis)    Multiple myeloma     Posterior subcapsular polar age-related cataract, right eye 10/10/2016    Posterior subcapsular polar age-related cataract of  right eye    Schizophrenia     Viral conjunctivitis, unspecified 10/10/2016    Acute viral conjunctivitis of both eyes     Past Surgical History:   Procedure Laterality Date    OTHER SURGICAL HISTORY  07/28/2017    Prior Surgical Procedure Not Done       Family History   Problem Relation Name Age of Onset    Heart attack Mother      Heart attack Father             MONITORING     MARKERS RECENT LAB VALUES   Free Kappa Light Chains Lab Results   Component Value Date    KAPPA 2.97 (H) 06/19/2025    KAPPA 1.47 04/24/2025    KAPPA 1.50 02/20/2025      Free Lambda Light Chains Lab Results   Component Value Date    LAMBDA 1.97 06/19/2025    LAMBDA 1.39 04/24/2025    LAMBDA 1.48 02/20/2025      Free Light Chain Ratio Lab Results   Component Value Date    KAPLS 1.51 06/19/2025    KAPLS 1.06 04/24/2025    KAPLS 1.01 02/20/2025       Immunofixation Lab Results   Component Value Date    IEPIN No monoclonal protein detected by immunofixation. 06/19/2025    IEPIN No monoclonal protein detected by immunofixation. 04/24/2025    IEPIN No monoclonal protein detected by immunofixation. 02/20/2025       IgG Lab Results   Component Value Date    IGG 1,230 06/19/2025    IGG 1,120 04/24/2025    IGG 1,180 02/20/2025      IgA Lab Results   Component Value Date     06/19/2025     04/24/2025     02/20/2025      IgM Lab Results   Component Value Date    IGM 74 06/19/2025    IGM 69 04/24/2025    IGM 70 02/20/2025        Other Labs  Lab Results   Component Value Date    WBC 4.3 (L) 07/10/2025    NRBC 0.0 07/10/2025    RBC 4.67 07/10/2025    HGB 12.5 (L) 07/10/2025    HCT 38.5 (L) 07/10/2025    MCV 82 07/10/2025    MCH 26.8 07/10/2025    MCHC 32.5 07/10/2025    RDW 14.6 (H) 07/10/2025     07/10/2025    IGPCT 0.2 07/10/2025    LYMPHOPCT 29.9 07/10/2025    MONOPCT 5.6 07/10/2025    EOSPCT 0.5 07/10/2025    BASOPCT 0.2 07/10/2025    NEUTROABS 2.72 07/10/2025    MONOSABS 0.24 07/10/2025    EOSABS 0.02 07/10/2025       Lab  Results   Component Value Date    GLUCOSE 114 (H) 07/10/2025     07/10/2025    K 3.4 (L) 07/10/2025     07/10/2025    CO2 28 07/10/2025    ANIONGAP 11 07/10/2025    BUN 17 07/10/2025    CREATININE 0.87 07/10/2025    EGFR >90 07/10/2025    CALCIUM 9.3 07/10/2025    ALBUMIN 4.2 07/10/2025    ALKPHOS 50 07/10/2025    PROT 7.1 07/10/2025    PROT 6.8 07/10/2025    AST 13 07/10/2025    BILITOT 0.6 07/10/2025    ALT 10 07/10/2025       Lab Results   Component Value Date     (H) 03/28/2024       ASSESSMENT & PLAN   Multiple Myeloma, IgG lambda, initially diagnosed in 7/2012  - Smoldering MM-> progressed in 8/2016 R-ISS stage II  - started on Rd on 9/15/16-> dose reduction>>> 2.5 mg currently  - Last bone survey in 5/2021: Small lesion of the right infra glenoid scapula unchanged from multiple prior studies. No new osseous lesions identified.  - Recent SPEP/IF: no measurable paraprotein  - Last UPEP/IF in 5/2016-> 182.4 mg/L IgG lambda  - Zometa last 11/18/21, no plans to resume at this time  - cont to monitor Myeloma panel every 2 months.  - Negative PET/CT 11/7/23  - Revlimid decreased to 3 weeks on 1 week off 1/2024  - discontinue Revlimid as of 2/15/24 due to cytopenia  - recent BMBx 2/22/24 - no morphologic evidence of plasma cell neoplasm.  - continue monitor q 2-3 months    Progressive Weakness Bilateral Lower Extremities  - xray lumbar spine ordered      Pancytopenia  - persistent after Revlimid was held  - BMBx from 2/22/24 as above -genetic/molecular study unremarkable.    CKD/GODFREY  - Cr 1.01>2.89  - K 2.8  - advise ED evaluation    Hypokalemia:  - Taking 20meq K TID  - K 3.1, pt. Stated he was only taking 20meq BID, encouraged TID dosing  - discontinued HCTZ  - advised to see PCP for HTN meds. 1X refill today    Constipation  - colace sent   - on MOM prn       RTC: 2months       MASON Noel-CNP

## 2025-07-11 LAB
KAPPA LC SERPL-MCNC: 1.44 MG/DL (ref 0.33–1.94)
KAPPA LC/LAMBDA SER: 1.24 {RATIO} (ref 0.26–1.65)
LAMBDA LC SERPL-MCNC: 1.16 MG/DL (ref 0.57–2.63)

## 2025-07-15 RX ORDER — AMLODIPINE BESYLATE 10 MG/1
10 TABLET ORAL DAILY
Qty: 90 TABLET | OUTPATIENT
Start: 2025-07-15

## 2025-07-15 RX ORDER — DOCUSATE SODIUM 100 MG/1
100 CAPSULE, LIQUID FILLED ORAL 2 TIMES DAILY
Qty: 60 CAPSULE | Refills: 1 | OUTPATIENT
Start: 2025-07-15

## 2025-07-29 DIAGNOSIS — I10 PRIMARY HYPERTENSION: ICD-10-CM

## 2025-07-29 DIAGNOSIS — C90.00 MULTIPLE MYELOMA, REMISSION STATUS UNSPECIFIED (MULTI): ICD-10-CM

## 2025-08-05 ENCOUNTER — OFFICE VISIT (OUTPATIENT)
Dept: HEMATOLOGY/ONCOLOGY | Facility: HOSPITAL | Age: 75
End: 2025-08-05
Payer: MEDICARE

## 2025-08-05 ENCOUNTER — LAB (OUTPATIENT)
Dept: LAB | Facility: HOSPITAL | Age: 75
End: 2025-08-05
Payer: MEDICARE

## 2025-08-05 VITALS
TEMPERATURE: 97.3 F | SYSTOLIC BLOOD PRESSURE: 148 MMHG | BODY MASS INDEX: 20.95 KG/M2 | HEART RATE: 73 BPM | DIASTOLIC BLOOD PRESSURE: 71 MMHG | WEIGHT: 137.8 LBS | OXYGEN SATURATION: 94 %

## 2025-08-05 DIAGNOSIS — I10 PRIMARY HYPERTENSION: ICD-10-CM

## 2025-08-05 DIAGNOSIS — C90.00 MULTIPLE MYELOMA, REMISSION STATUS UNSPECIFIED (MULTI): ICD-10-CM

## 2025-08-05 LAB
ALBUMIN SERPL BCP-MCNC: 4.1 G/DL (ref 3.4–5)
ALP SERPL-CCNC: 62 U/L (ref 33–136)
ALT SERPL W P-5'-P-CCNC: 16 U/L (ref 10–52)
ANION GAP SERPL CALC-SCNC: 11 MMOL/L (ref 10–20)
AST SERPL W P-5'-P-CCNC: 16 U/L (ref 9–39)
BASOPHILS # BLD AUTO: 0.01 X10*3/UL (ref 0–0.1)
BASOPHILS NFR BLD AUTO: 0.2 %
BILIRUB SERPL-MCNC: 0.8 MG/DL (ref 0–1.2)
BUN SERPL-MCNC: 11 MG/DL (ref 6–23)
CALCIUM SERPL-MCNC: 9 MG/DL (ref 8.6–10.3)
CHLORIDE SERPL-SCNC: 105 MMOL/L (ref 98–107)
CO2 SERPL-SCNC: 28 MMOL/L (ref 21–32)
CREAT SERPL-MCNC: 0.94 MG/DL (ref 0.5–1.3)
EGFRCR SERPLBLD CKD-EPI 2021: 86 ML/MIN/1.73M*2
EOSINOPHIL # BLD AUTO: 0.04 X10*3/UL (ref 0–0.4)
EOSINOPHIL NFR BLD AUTO: 0.9 %
ERYTHROCYTE [DISTWIDTH] IN BLOOD BY AUTOMATED COUNT: 15 % (ref 11.5–14.5)
GLUCOSE SERPL-MCNC: 113 MG/DL (ref 74–99)
HCT VFR BLD AUTO: 34.6 % (ref 41–52)
HGB BLD-MCNC: 11.3 G/DL (ref 13.5–17.5)
IGA SERPL-MCNC: 142 MG/DL (ref 70–400)
IGG SERPL-MCNC: 1170 MG/DL (ref 700–1600)
IGM SERPL-MCNC: 62 MG/DL (ref 40–230)
IMM GRANULOCYTES # BLD AUTO: 0.03 X10*3/UL (ref 0–0.5)
IMM GRANULOCYTES NFR BLD AUTO: 0.6 % (ref 0–0.9)
LYMPHOCYTES # BLD AUTO: 1.21 X10*3/UL (ref 0.8–3)
LYMPHOCYTES NFR BLD AUTO: 25.9 %
MCH RBC QN AUTO: 27.6 PG (ref 26–34)
MCHC RBC AUTO-ENTMCNC: 32.7 G/DL (ref 32–36)
MCV RBC AUTO: 85 FL (ref 80–100)
MONOCYTES # BLD AUTO: 0.3 X10*3/UL (ref 0.05–0.8)
MONOCYTES NFR BLD AUTO: 6.4 %
NEUTROPHILS # BLD AUTO: 3.08 X10*3/UL (ref 1.6–5.5)
NEUTROPHILS NFR BLD AUTO: 66 %
NRBC BLD-RTO: 0 /100 WBCS (ref 0–0)
PLATELET # BLD AUTO: 176 X10*3/UL (ref 150–450)
POTASSIUM SERPL-SCNC: 3.6 MMOL/L (ref 3.5–5.3)
PROT SERPL-MCNC: 6.9 G/DL (ref 6.4–8.2)
PROT SERPL-MCNC: 7 G/DL (ref 6.4–8.2)
RBC # BLD AUTO: 4.09 X10*6/UL (ref 4.5–5.9)
SODIUM SERPL-SCNC: 140 MMOL/L (ref 136–145)
WBC # BLD AUTO: 4.7 X10*3/UL (ref 4.4–11.3)

## 2025-08-05 PROCEDURE — 99215 OFFICE O/P EST HI 40 MIN: CPT

## 2025-08-05 PROCEDURE — 1159F MED LIST DOCD IN RCRD: CPT

## 2025-08-05 PROCEDURE — 84155 ASSAY OF PROTEIN SERUM: CPT

## 2025-08-05 PROCEDURE — 83521 IG LIGHT CHAINS FREE EACH: CPT

## 2025-08-05 PROCEDURE — 3077F SYST BP >= 140 MM HG: CPT

## 2025-08-05 PROCEDURE — 3078F DIAST BP <80 MM HG: CPT

## 2025-08-05 PROCEDURE — 85025 COMPLETE CBC W/AUTO DIFF WBC: CPT

## 2025-08-05 PROCEDURE — 82784 ASSAY IGA/IGD/IGG/IGM EACH: CPT

## 2025-08-05 PROCEDURE — 36415 COLL VENOUS BLD VENIPUNCTURE: CPT

## 2025-08-05 PROCEDURE — 80053 COMPREHEN METABOLIC PANEL: CPT

## 2025-08-05 PROCEDURE — 1126F AMNT PAIN NOTED NONE PRSNT: CPT

## 2025-08-05 RX ORDER — DAPSONE 100 MG/1
1 TABLET ORAL DAILY
Qty: 30 TABLET | Refills: 0 | Status: SHIPPED | OUTPATIENT
Start: 2025-08-05

## 2025-08-05 ASSESSMENT — ENCOUNTER SYMPTOMS
CONSTIPATION: 1
EYES NEGATIVE: 1
PSYCHIATRIC NEGATIVE: 1
ENDOCRINE NEGATIVE: 1
RESPIRATORY NEGATIVE: 1
CARDIOVASCULAR NEGATIVE: 1
HEMATOLOGIC/LYMPHATIC NEGATIVE: 1
CONSTITUTIONAL NEGATIVE: 1

## 2025-08-05 ASSESSMENT — PAIN SCALES - GENERAL: PAINLEVEL_OUTOF10: 0-NO PAIN

## 2025-08-05 NOTE — PROGRESS NOTES
Patient ID: Varinder Lizama is a 75 y.o. male.    Oncology History Overview Note   Smoldering multiple myeloma, ISS stage II:  Characterized by IgG lambda-restricted paraprotein, initially 2.9 g/dL without evidence of end-organ damage. No lytic bone lesions. No anemia. No hypercalcemia. No renal dysfunction. 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 His kappa lambda light chains are low risk. He also has microcytosis likely secondary to alpha-thalassemia  versus iron deficiency.     Symptomatic IgG Lambda Multiple Myeloma ISS stage II   Characterized by anemia noted in 8/2016    Lenalidomide (25 mg daily 21 days on 7 days off) plus Dexamethasone (40 mg once weekly) started on 9/15/2016, stopped briefly due to rash restarted on 12/17/2016    Started maintenance Lenalidomide 10 mg  5/2017 every other day decreasing to 2.5 mg daily 12/2017.    PET/CT (11/7/23):   There is no hypermetabolic activity to suggest active focal  neoplastic process. No increased FDG uptake in the right scapula to  suggest disease involvement.    Continued pancytopenia noted through 2023. Revlimid dose reduced to 2.5mg 3 weeks on, 1 week off in 1/2024.     Held Revlimid as of 2/2024 due to continued cytopenias.    -- NORMOCELLULAR BONE MARROW WITH  MATURING TRILINEAGE HEMATOPOESIS AND GRANULOCYTIC HYPOPLASIA, SEE NOTE  -- NO DEFINITE MORPHOLOGIC OR IMMUNOPHENOTYPIC EVIDENCE OF PLASMA MAYA NEOPLASM     NOTE: Granulopoiesis appears decreased but the granulocytes present show complete maturation. The finding is of unclear etiology. Clinical correlation and correlation with pending genetic/molecular studies suggested.  Decreased iron storage    - Continue to hold revlimid at this time        Multiple myeloma   10/20/2023 Initial Diagnosis    Multiple myeloma (CMS/HCC)         SUBJECTIVE     2/15/24: Revlimid held d/t  cytopenia  3/28/24: No acute complaints at this time. Snow is concerned about his worsening memory loss and needing more help with daily ADL.  6/27/24: Varinder reports feeling well today. He states his energy is good and he is eating well. His friend and caretaker, Snow, feels that he needs more assistance at home with cooking/ bathing/ ADLs. Denies n/v/d/c, weight loss, fever, chill, dizziness, night sweats.   11/14/24: Reports feeling well today. Notes he has difficulty moving around. Feels he is constipated occasionally but has BM approximately every other day. Denies fevers, pain, SOB, swelling, weight loss, night sweats, neuropathy, bruising  1/9/25: reports feeling well today, struggles with constipation but has not been using stool softener. Encouraged to use colace BID. SW present for visit today to discuss referral to PACE program. Denies fevers, n/v/d/c, rash, lymphadenopathy, new pain, SOB, chest pain, edema.  2/20/25: no c/o. Came with his long time friend.   4/24/25: Varinder presents to clinic today for follow up, accompanied by his friend, Snow. Varinder reports feeling well overall today, but Snow notes that Varinder is walking slower than usual. Varinder endorses that he feels a little weaker than last visit. Denies pain, numbness, falls, new bony pain, unintentional weight loss, fevers, night sweats, chest pain, edema, SOB.   6/19/25: c/o constipation. Poor appetite. Denies any recent f/c/n/v  7/10/25: presents accompanied by his friend Snow, reports feeling well, no new concerns   8/5/25: Feeling well, has upcoming NPV w/ PCP. No new concerns       Review of Systems   Constitutional: Negative.    HENT:  Negative.     Eyes: Negative.    Respiratory: Negative.     Cardiovascular: Negative.    Gastrointestinal:  Positive for constipation.   Endocrine: Negative.    Genitourinary: Negative.     Skin: Negative.    Hematological: Negative.    Psychiatric/Behavioral: Negative.     All other systems reviewed and are  negative.        OBJECTIVE   BSA: 1.73 meters squared    /71 (BP Location: Right arm, Patient Position: Sitting, BP Cuff Size: Adult)   Pulse 73   Temp 36.3 °C (97.3 °F) (Temporal)   Wt 62.5 kg (137 lb 12.8 oz)   SpO2 94%   BMI 20.95 kg/m²   Vital signs reviewed in flowsheets.   Physical Exam  Constitutional:       Appearance: He is normal weight.   HENT:      Head: Normocephalic and atraumatic.      Mouth/Throat:      Mouth: Mucous membranes are dry.      Comments: brown plaque on tongue    Eyes:      Extraocular Movements: Extraocular movements intact.      Conjunctiva/sclera: Conjunctivae normal.      Pupils: Pupils are equal, round, and reactive to light.       Cardiovascular:      Pulses: Normal pulses.      Heart sounds: Normal heart sounds.   Pulmonary:      Effort: Pulmonary effort is normal.      Breath sounds: Normal breath sounds.   Abdominal:      General: Abdomen is flat. Bowel sounds are normal.      Palpations: Abdomen is soft.     Musculoskeletal:         General: Normal range of motion.      Cervical back: Normal range of motion and neck supple.     Skin:     General: Skin is warm and dry.     Neurological:      Mental Status: He is alert. Mental status is at baseline.         Performance Status:  Karnofsky Score: 60 - Requires occasional assistance, but is able care for most of personal needs    Past Medical History:   Diagnosis Date    Age-related nuclear cataract, bilateral 11/23/2016    Age-related nuclear cataract of both eyes    Angioneurotic edema, initial encounter 04/07/2015    ACE inhibitor-aggravated angioedema    CKD (chronic kidney disease)     Cortical age-related cataract, bilateral 11/23/2016    Cortical age-related cataract of both eyes    Food insecurity 05/19/2022    Food insecurity    HTN (hypertension)     Keratoconjunctivitis due to adenovirus 08/04/2016    EKC (epidemic keratoconjunctivitis)    Multiple myeloma     Posterior subcapsular polar age-related cataract,  right eye 10/10/2016    Posterior subcapsular polar age-related cataract of right eye    Schizophrenia     Viral conjunctivitis, unspecified 10/10/2016    Acute viral conjunctivitis of both eyes     Past Surgical History:   Procedure Laterality Date    OTHER SURGICAL HISTORY  07/28/2017    Prior Surgical Procedure Not Done       Family History   Problem Relation Name Age of Onset    Heart attack Mother      Heart attack Father             MONITORING     MARKERS RECENT LAB VALUES   Free Kappa Light Chains Lab Results   Component Value Date    KAPPA 1.44 07/10/2025    KAPPA 2.97 (H) 06/19/2025    KAPPA 1.47 04/24/2025      Free Lambda Light Chains Lab Results   Component Value Date    LAMBDA 1.16 07/10/2025    LAMBDA 1.97 06/19/2025    LAMBDA 1.39 04/24/2025      Free Light Chain Ratio Lab Results   Component Value Date    KAPLS 1.24 07/10/2025    KAPLS 1.51 06/19/2025    KAPLS 1.06 04/24/2025       Immunofixation Lab Results   Component Value Date    IEPIN No monoclonal protein detected by immunofixation. 07/10/2025    IEPIN No monoclonal protein detected by immunofixation. 06/19/2025    IEPIN No monoclonal protein detected by immunofixation. 04/24/2025       IgG Lab Results   Component Value Date    IGG 1,180 07/10/2025    IGG 1,230 06/19/2025    IGG 1,120 04/24/2025      IgA Lab Results   Component Value Date     07/10/2025     06/19/2025     04/24/2025      IgM Lab Results   Component Value Date    IGM 65 07/10/2025    IGM 74 06/19/2025    IGM 69 04/24/2025        Other Labs  Lab Results   Component Value Date    WBC 4.7 08/05/2025    NRBC 0.0 08/05/2025    RBC 4.09 (L) 08/05/2025    HGB 11.3 (L) 08/05/2025    HCT 34.6 (L) 08/05/2025    MCV 85 08/05/2025    MCH 27.6 08/05/2025    MCHC 32.7 08/05/2025    RDW 15.0 (H) 08/05/2025     08/05/2025    IGPCT 0.6 08/05/2025    LYMPHOPCT 25.9 08/05/2025    MONOPCT 6.4 08/05/2025    EOSPCT 0.9 08/05/2025    BASOPCT 0.2 08/05/2025    NEUTROABS 3.08  08/05/2025    MONOSABS 0.30 08/05/2025    EOSABS 0.04 08/05/2025       Lab Results   Component Value Date    GLUCOSE 113 (H) 08/05/2025     08/05/2025    K 3.6 08/05/2025     08/05/2025    CO2 28 08/05/2025    ANIONGAP 11 08/05/2025    BUN 11 08/05/2025    CREATININE 0.94 08/05/2025    EGFR 86 08/05/2025    CALCIUM 9.0 08/05/2025    ALBUMIN 4.1 08/05/2025    ALKPHOS 62 08/05/2025    PROT 7.0 08/05/2025    PROT 6.9 08/05/2025    AST 16 08/05/2025    BILITOT 0.8 08/05/2025    ALT 16 08/05/2025       Lab Results   Component Value Date     (H) 03/28/2024       ASSESSMENT & PLAN   Multiple Myeloma, IgG lambda, initially diagnosed in 7/2012  - Smoldering MM-> progressed in 8/2016 R-ISS stage II  - started on Rd on 9/15/16-> dose reduction>>> 2.5 mg currently  - Last bone survey in 5/2021: Small lesion of the right infra glenoid scapula unchanged from multiple prior studies. No new osseous lesions identified.  - Recent SPEP/IF: no measurable paraprotein  - Last UPEP/IF in 5/2016-> 182.4 mg/L IgG lambda  - Zometa last 11/18/21, no plans to resume at this time  - cont to monitor Myeloma panel every 2 months.  - Negative PET/CT 11/7/23  - Revlimid decreased to 3 weeks on 1 week off 1/2024  - discontinue Revlimid as of 2/15/24 due to cytopenia  - recent BMBx 2/22/24 - no morphologic evidence of plasma cell neoplasm.  - continue monitor q 2-3 months    Progressive Weakness Bilateral Lower Extremities  - xray lumbar spine ordered      Pancytopenia  - persistent after Revlimid was held  - BMBx from 2/22/24 as above -genetic/molecular study unremarkable.    CKD/GODFREY  - Cr 1.01>2.89  - K 2.8  - advise ED evaluation    Hypokalemia:  - Taking 20meq K TID  - K 3.1, pt. Stated he was only taking 20meq BID, encouraged TID dosing  - discontinued HCTZ  - advised to see PCP for HTN meds. 1X refill today    Constipation  - colace sent   - on MOM prn       RTC: 3 months       Amy Cruz, MASON-CNP

## 2025-08-06 LAB
KAPPA LC SERPL-MCNC: 1.43 MG/DL (ref 0.33–1.94)
KAPPA LC/LAMBDA SER: 0.99 {RATIO} (ref 0.26–1.65)
LAMBDA LC SERPL-MCNC: 1.45 MG/DL (ref 0.57–2.63)

## 2025-09-23 ENCOUNTER — APPOINTMENT (OUTPATIENT)
Facility: CLINIC | Age: 75
End: 2025-09-23
Payer: MEDICARE